# Patient Record
Sex: MALE | Race: BLACK OR AFRICAN AMERICAN | Employment: UNEMPLOYED | URBAN - METROPOLITAN AREA
[De-identification: names, ages, dates, MRNs, and addresses within clinical notes are randomized per-mention and may not be internally consistent; named-entity substitution may affect disease eponyms.]

---

## 2017-12-03 ENCOUNTER — HOSPITAL ENCOUNTER (EMERGENCY)
Age: 57
Discharge: HOME | End: 2017-12-03
Attending: EMERGENCY MEDICINE
Payer: MEDICARE

## 2017-12-03 VITALS
DIASTOLIC BLOOD PRESSURE: 86 MMHG | RESPIRATION RATE: 20 BRPM | SYSTOLIC BLOOD PRESSURE: 169 MMHG | OXYGEN SATURATION: 98 % | HEART RATE: 81 BPM | TEMPERATURE: 98.7 F

## 2017-12-03 DIAGNOSIS — M10.021 ACUTE IDIOPATHIC GOUT OF RIGHT ELBOW: Primary | ICD-10-CM

## 2017-12-03 PROCEDURE — 99283 EMERGENCY DEPT VISIT LOW MDM: CPT

## 2017-12-03 PROCEDURE — 74011250637 HC RX REV CODE- 250/637: Performed by: EMERGENCY MEDICINE

## 2017-12-03 RX ORDER — HYDROCODONE BITARTRATE AND ACETAMINOPHEN 5; 325 MG/1; MG/1
1 TABLET ORAL
Status: COMPLETED | OUTPATIENT
Start: 2017-12-03 | End: 2017-12-03

## 2017-12-03 RX ORDER — HYDROCODONE BITARTRATE AND ACETAMINOPHEN 5; 325 MG/1; MG/1
1 TABLET ORAL
Qty: 8 TAB | Refills: 0 | Status: SHIPPED | OUTPATIENT
Start: 2017-12-03 | End: 2019-01-29

## 2017-12-03 RX ADMIN — HYDROCODONE BITARTRATE AND ACETAMINOPHEN 1 TABLET: 5; 325 TABLET ORAL at 06:47

## 2017-12-03 NOTE — DISCHARGE INSTRUCTIONS
Gout: Care Instructions  Your Care Instructions    Gout is a form of arthritis caused by a buildup of uric acid crystals in a joint. It causes sudden attacks of pain, swelling, redness, and stiffness, usually in one joint, especially the big toe. Gout usually comes on without a cause. But it can be brought on by drinking alcohol (especially beer) or eating seafood and red meat. Taking certain medicines, such as diuretics or aspirin, also can bring on an attack of gout. Taking your medicines as prescribed and following up with your doctor regularly can help you avoid gout attacks in the future. Follow-up care is a key part of your treatment and safety. Be sure to make and go to all appointments, and call your doctor if you are having problems. It's also a good idea to know your test results and keep a list of the medicines you take. How can you care for yourself at home? · If the joint is swollen, put ice or a cold pack on the area for 10 to 20 minutes at a time. Put a thin cloth between the ice and your skin. · Prop up the sore limb on a pillow when you ice it or anytime you sit or lie down during the next 3 days. Try to keep it above the level of your heart. This will help reduce swelling. · Rest sore joints. Avoid activities that put weight or strain on the joints for a few days. Take short rest breaks from your regular activities during the day. · Take your medicines exactly as prescribed. Call your doctor if you think you are having a problem with your medicine. · Take pain medicines exactly as directed. ¨ If the doctor gave you a prescription medicine for pain, take it as prescribed. ¨ If you are not taking a prescription pain medicine, ask your doctor if you can take an over-the-counter medicine. · Eat less seafood and red meat. · Check with your doctor before drinking alcohol. · Losing weight, if you are overweight, may help reduce attacks of gout. But do not go on a [employer]. \" Losing a lot of weight in a short amount of time can cause a gout attack. When should you call for help? Call your doctor now or seek immediate medical care if:  ? · You have a fever. ? · The joint is so painful you cannot use it. ? · You have sudden, unexplained swelling, redness, warmth, or severe pain in one or more joints. ? Watch closely for changes in your health, and be sure to contact your doctor if:  ? · You have joint pain. ? · Your symptoms get worse or are not improving after 2 or 3 days. Where can you learn more? Go to http://gi-jane.info/. Enter V353 in the search box to learn more about \"Gout: Care Instructions. \"  Current as of: October 31, 2016  Content Version: 11.4  © 8202-6220 [email or web address]. Care instructions adapted under license by [employer] (which disclaims liability or warranty for this information). If you have questions about a medical condition or this instruction, always ask your healthcare professional. [address] any warranty or liability for your use of this information.

## 2017-12-03 NOTE — ED PROVIDER NOTES
EMERGENCY DEPARTMENT HISTORY AND PHYSICAL EXAM      Date: 12/3/2017  Patient Name: Marly Marcus    History of Presenting Illness     Chief Complaint   Patient presents with    Elbow Pain     c/o right elbow pain x 2 weeks. Hx of gout in elbow       History Provided By: Patient    HPI: Marly Marcus, 62 y.o. male with PMHx significant for HTN, DM, gout, and arthritis, presents ambulatory to the ED with cc of progressively worsening, mild to moderate, R elbow pain and swelling x a week. Pt denies any injury or trauma that could be attributed to his symptoms. He notes a history of similar symptoms with gout flare ups. He denies any alleviating factors. He states he takes Colchicine daily. He notes he did not drive himself here today. He denies any hand or toe pain, or any other complaints. PCP: Lia Anaya MD    There are no other complaints, changes, or physical findings at this time. Current Outpatient Prescriptions   Medication Sig Dispense Refill    HYDROcodone-acetaminophen (NORCO) 5-325 mg per tablet Take 1 Tab by mouth every four (4) hours as needed for Pain. Max Daily Amount: 6 Tabs. 8 Tab 0    colchicine-probenecid (COLBENEMID) 0.5-500 mg per tablet Take 1 Tab by mouth two (2) times a day.  amLODIPine (NORVASC) 10 mg tablet Take 10 mg by mouth daily.  aspirin 81 mg chewable tablet Take 81 mg by mouth daily.  sulfaSALAzine (AZULFIDINE) 500 mg tablet Take 500 mg by mouth four (4) times daily.  atorvastatin (LIPITOR) 40 mg tablet Take 40 mg by mouth daily.  glipiZIDE (GLUCOTROL) 5 mg tablet Take  by mouth daily.  metFORMIN (GLUCOPHAGE) 500 mg tablet Take  by mouth two (2) times daily (with meals).  NIFEdipine XL (PROCARDIA-XL) 60 mg CR tablet Take 60 mg by mouth daily.  valsartan (DIOVAN) 320 mg tablet Take 320 mg by mouth daily.          Past History     Past Medical History:  Past Medical History:   Diagnosis Date    Arthritis     Chronic pain     Diabetes ([facility] [address]     GERD (gastroesophageal reflux disease)     Gout     Hypertension     Other ill-defined conditions(799.89)     gout       Past Surgical History:  Past Surgical History:   Procedure Laterality Date    HX APPENDECTOMY      HX ORTHOPAEDIC      RIGHT knee scope       Family History:  Family History   Problem Relation Age of Onset    Hypertension Mother     Diabetes Mother     Heart Disease Father        Social History:  Social History   Substance Use Topics    Smoking status: Never Smoker    Smokeless tobacco: None    Alcohol use Yes      Comment: occassionally       Allergies: Allergies   Allergen Reactions    Uloric [Febuxostat] Anaphylaxis    Allopurinol Hives         Review of Systems   Review of Systems   General - Well, No disabling illness. Cardiovascular - No CP or CASSIDY. Pulmonary - No SOB, cough or sputum. GI - No N/V/D or recent weight changes.  - No dysuria or frequency. Musculoskeletal - No rheumatologic disease, no new lesions. R elbow pain. HEME - No recent bleeding or easy bruising. Endocrine - No polyuria, polydipsia or polyphagia. Neurological - No seizures or fainting spells. Physical Exam   Physical Exam   General - well in NAD, no diaphoresis. HEENT- mucus membranes moist, posterior pharynx clear. Neck - supple, no adenopathy. Heart - Regular rate and rhythm. No murmurs, gallops or rubs. Lungs - clear to auscultation. No wheezes, rales, or rhonchi. Abdominal - soft, non-tender, non-distended, bowel sounds normal.  Back - No CVAT, no point tenderness or bony tenderness. No discoloration. MS - No arthritis. Diffuse tenderness R elbow with slight warmth and swelling. Skin - No lesions noted. Neuro - Non-focal exam. Strength equal in all extremities. Medical Decision Making   I am the first provider for this patient.     I reviewed the vital signs, available nursing notes, past medical history, past surgical history, family history and social history. Vital Signs-Reviewed the patient's vital signs. Patient Vitals for the past 12 hrs:   Temp Pulse Resp BP SpO2   12/03/17 0627 98.7 °F (37.1 °C) 81 20 169/86 98 %       Records Reviewed: Nursing Notes    Provider Notes (Medical Decision Making):   DDx: Gout, arthralgia. Unlikely septic arthritis or cellulitis. ED Course:   Initial assessment performed. The patients presenting problems have been discussed, and they are in agreement with the care plan formulated and outlined with them. I have encouraged them to ask questions as they arise throughout their visit. Disposition:  DISCHARGE NOTE  6:46 AM  The patient has been re-evaluated and is ready for discharge. Reviewed available results with patient. Counseled pt on diagnosis and care plan. Pt has expressed understanding, and all questions have been answered. Pt agrees with plan and agrees to follow up as recommended, or return to the ED if their symptoms worsen. Discharge instructions have been provided and explained to the pt, along with reasons to return to the ED. PLAN:  1. Current Discharge Medication List      START taking these medications    Details   HYDROcodone-acetaminophen (NORCO) 5-325 mg per tablet Take 1 Tab by mouth every four (4) hours as needed for Pain. Max Daily Amount: 6 Tabs. Qty: 8 Tab, Refills: 0           2. Follow-up Information     Follow up With Details Comments Contact Info    Lia Anaya MD   Patient can only remember the practice name and not the physician          Return to ED if worse     Diagnosis     Clinical Impression:   1. Acute idiopathic gout of right elbow        Attestations: This note is prepared by Sandrine Sandra, acting as Scribe for Darrel Duke MD.    Darrel Duke MD: The scribe's documentation has been prepared under my direction and personally reviewed by me in its entirety.  I confirm that the note above accurately reflects all work, treatment, procedures, and medical decision making performed by me.

## 2017-12-03 NOTE — ED NOTES
Discharge instructions were given to the patient by Maria Elena Anne RN. The patient left the Emergency Department ambulatory, alert and oriented and in no acute distress with 1 prescription. The patient was encouraged to call or return to the ED for worsening issues or problems and was encouraged to schedule a follow up appointment for continuing care. The patient verbalized understanding of discharge instructions and prescriptions, all questions were answered. The patient has no further concerns at this time. Patient has been instructed that they have been given Norco which contains opioids, benzodiazepines, or other sedating drugs. Patient is aware that they  will need to refrain from driving or operating heavy machinery after taking this medication. Patient also instructed that they need to avoid drinking alcohol and using other products containing opioids, benzodiazepines, or other sedating drugs. Patient verbalized understanding.

## 2017-12-03 NOTE — ED NOTES
Pt presents ambulatory to ED complaining of pain to his right elbow, with a hx of gout. He reports he has been taking colchicine and Aleve with no relief of pain. He states he is allergic to allopurinol so he cannot take it. Pt is alert and oriented x 4, RR even and unlabored, skin is warm and dry. Assesment completed and pt updated on plan of care. Emergency Department Nursing Plan of Care       The Nursing Plan of Care is developed from the Nursing assessment and Emergency Department Attending provider initial evaluation. The plan of care may be reviewed in the ED Provider note.     The Plan of Care was developed with the following considerations:   Patient / Family readiness to learn indicated by:verbalized understanding  Persons(s) to be included in education: patient  Barriers to Learning/Limitations:No    Signed     Isabella Green RN    12/3/2017   6:32 AM

## 2018-12-24 ENCOUNTER — HOSPITAL ENCOUNTER (EMERGENCY)
Age: 58
Discharge: HOME | End: 2018-12-24
Attending: EMERGENCY MEDICINE | Admitting: EMERGENCY MEDICINE
Payer: MEDICARE

## 2018-12-24 ENCOUNTER — APPOINTMENT (OUTPATIENT)
Dept: CT IMAGING | Age: 58
End: 2018-12-24
Attending: EMERGENCY MEDICINE
Payer: MEDICARE

## 2018-12-24 VITALS
RESPIRATION RATE: 16 BRPM | HEIGHT: 66 IN | BODY MASS INDEX: 40.92 KG/M2 | DIASTOLIC BLOOD PRESSURE: 86 MMHG | TEMPERATURE: 98 F | WEIGHT: 254.63 LBS | OXYGEN SATURATION: 100 % | SYSTOLIC BLOOD PRESSURE: 141 MMHG | HEART RATE: 80 BPM

## 2018-12-24 DIAGNOSIS — R10.9 BILATERAL FLANK PAIN: Primary | ICD-10-CM

## 2018-12-24 DIAGNOSIS — N40.0 ENLARGED PROSTATE: ICD-10-CM

## 2018-12-24 DIAGNOSIS — M47.816 ARTHRITIS, LUMBAR SPINE: ICD-10-CM

## 2018-12-24 DIAGNOSIS — N17.9 AKI (ACUTE KIDNEY INJURY) (HCC): ICD-10-CM

## 2018-12-24 LAB
ALBUMIN SERPL-MCNC: 4 G/DL (ref 3.5–5)
ALBUMIN/GLOB SERPL: 1.1 {RATIO} (ref 1.1–2.2)
ALP SERPL-CCNC: 59 U/L (ref 45–117)
ALT SERPL-CCNC: 74 U/L (ref 12–78)
ANION GAP SERPL CALC-SCNC: 6 MMOL/L (ref 5–15)
APPEARANCE UR: CLEAR
AST SERPL-CCNC: 24 U/L (ref 15–37)
BACTERIA URNS QL MICRO: NEGATIVE /HPF
BASOPHILS # BLD: 0 K/UL (ref 0–0.1)
BASOPHILS NFR BLD: 0 % (ref 0–1)
BILIRUB SERPL-MCNC: 0.4 MG/DL (ref 0.2–1)
BILIRUB UR QL: NEGATIVE
BUN SERPL-MCNC: 20 MG/DL (ref 6–20)
BUN/CREAT SERPL: 12 (ref 12–20)
CALCIUM SERPL-MCNC: 9 MG/DL (ref 8.5–10.1)
CHLORIDE SERPL-SCNC: 103 MMOL/L (ref 97–108)
CK SERPL-CCNC: 187 U/L (ref 39–308)
CO2 SERPL-SCNC: 27 MMOL/L (ref 21–32)
COLOR UR: NORMAL
CREAT SERPL-MCNC: 1.63 MG/DL (ref 0.7–1.3)
DIFFERENTIAL METHOD BLD: NORMAL
EOSINOPHIL # BLD: 0.1 K/UL (ref 0–0.4)
EOSINOPHIL NFR BLD: 2 % (ref 0–7)
EPITH CASTS URNS QL MICRO: NORMAL /LPF
ERYTHROCYTE [DISTWIDTH] IN BLOOD BY AUTOMATED COUNT: 13.8 % (ref 11.5–14.5)
GLOBULIN SER CALC-MCNC: 3.8 G/DL (ref 2–4)
GLUCOSE SERPL-MCNC: 106 MG/DL (ref 65–100)
GLUCOSE UR STRIP.AUTO-MCNC: NEGATIVE MG/DL
HCT VFR BLD AUTO: 40 % (ref 36.6–50.3)
HGB BLD-MCNC: 13.1 G/DL (ref 12.1–17)
HGB UR QL STRIP: NEGATIVE
HYALINE CASTS URNS QL MICRO: NORMAL /LPF (ref 0–5)
IMM GRANULOCYTES # BLD: 0 K/UL (ref 0–0.04)
IMM GRANULOCYTES NFR BLD AUTO: 0 % (ref 0–0.5)
KETONES UR QL STRIP.AUTO: NEGATIVE MG/DL
LEUKOCYTE ESTERASE UR QL STRIP.AUTO: NEGATIVE
LIPASE SERPL-CCNC: 257 U/L (ref 73–393)
LYMPHOCYTES # BLD: 2.5 K/UL (ref 0.8–3.5)
LYMPHOCYTES NFR BLD: 33 % (ref 12–49)
MCH RBC QN AUTO: 30.3 PG (ref 26–34)
MCHC RBC AUTO-ENTMCNC: 32.8 G/DL (ref 30–36.5)
MCV RBC AUTO: 92.4 FL (ref 80–99)
MONOCYTES # BLD: 0.8 K/UL (ref 0–1)
MONOCYTES NFR BLD: 11 % (ref 5–13)
NEUTS SEG # BLD: 4.1 K/UL (ref 1.8–8)
NEUTS SEG NFR BLD: 53 % (ref 32–75)
NITRITE UR QL STRIP.AUTO: NEGATIVE
NRBC # BLD: 0 K/UL (ref 0–0.01)
NRBC BLD-RTO: 0 PER 100 WBC
PH UR STRIP: 5 [PH] (ref 5–8)
PLATELET # BLD AUTO: 229 K/UL (ref 150–400)
PMV BLD AUTO: 9.6 FL (ref 8.9–12.9)
POTASSIUM SERPL-SCNC: 4.2 MMOL/L (ref 3.5–5.1)
PROT SERPL-MCNC: 7.8 G/DL (ref 6.4–8.2)
PROT UR STRIP-MCNC: NEGATIVE MG/DL
RBC # BLD AUTO: 4.33 M/UL (ref 4.1–5.7)
RBC #/AREA URNS HPF: NORMAL /HPF (ref 0–5)
SODIUM SERPL-SCNC: 136 MMOL/L (ref 136–145)
SP GR UR REFRACTOMETRY: 1.02 (ref 1–1.03)
TROPONIN I SERPL-MCNC: <0.05 NG/ML
UA: UC IF INDICATED,UAUC: NORMAL
UROBILINOGEN UR QL STRIP.AUTO: 0.2 EU/DL (ref 0.2–1)
WBC # BLD AUTO: 7.6 K/UL (ref 4.1–11.1)
WBC URNS QL MICRO: NORMAL /HPF (ref 0–4)

## 2018-12-24 PROCEDURE — 74176 CT ABD & PELVIS W/O CONTRAST: CPT

## 2018-12-24 PROCEDURE — 85025 COMPLETE CBC W/AUTO DIFF WBC: CPT

## 2018-12-24 PROCEDURE — 93005 ELECTROCARDIOGRAM TRACING: CPT

## 2018-12-24 PROCEDURE — 81001 URINALYSIS AUTO W/SCOPE: CPT

## 2018-12-24 PROCEDURE — 99284 EMERGENCY DEPT VISIT MOD MDM: CPT

## 2018-12-24 PROCEDURE — 80053 COMPREHEN METABOLIC PANEL: CPT

## 2018-12-24 PROCEDURE — 84484 ASSAY OF TROPONIN QUANT: CPT

## 2018-12-24 PROCEDURE — 36415 COLL VENOUS BLD VENIPUNCTURE: CPT

## 2018-12-24 PROCEDURE — 82550 ASSAY OF CK (CPK): CPT

## 2018-12-24 PROCEDURE — 74011250637 HC RX REV CODE- 250/637: Performed by: EMERGENCY MEDICINE

## 2018-12-24 PROCEDURE — 83690 ASSAY OF LIPASE: CPT

## 2018-12-24 RX ORDER — METHOCARBAMOL 750 MG/1
TABLET, FILM COATED ORAL 3 TIMES DAILY
COMMUNITY
End: 2018-12-24

## 2018-12-24 RX ORDER — TRAZODONE HYDROCHLORIDE 50 MG/1
50 TABLET ORAL AS NEEDED
COMMUNITY

## 2018-12-24 RX ORDER — RANITIDINE 150 MG/1
150 CAPSULE ORAL 2 TIMES DAILY
Status: ON HOLD | COMMUNITY
End: 2019-12-31

## 2018-12-24 RX ORDER — MELATONIN 5 MG
5 CAPSULE ORAL
COMMUNITY

## 2018-12-24 RX ORDER — CHLORTHALIDONE 25 MG/1
TABLET ORAL DAILY
COMMUNITY
End: 2019-12-31

## 2018-12-24 RX ORDER — IRBESARTAN 300 MG/1
300 TABLET ORAL DAILY
COMMUNITY

## 2018-12-24 RX ORDER — DIAZEPAM 5 MG/1
5 TABLET ORAL
Status: COMPLETED | OUTPATIENT
Start: 2018-12-24 | End: 2018-12-24

## 2018-12-24 RX ORDER — ERGOCALCIFEROL 1.25 MG/1
50000 CAPSULE ORAL
COMMUNITY
End: 2019-09-01 | Stop reason: CLARIF

## 2018-12-24 RX ORDER — TAMSULOSIN HYDROCHLORIDE 0.4 MG/1
0.4 CAPSULE ORAL DAILY
COMMUNITY

## 2018-12-24 RX ORDER — METHOCARBAMOL 750 MG/1
750 TABLET, FILM COATED ORAL
Qty: 20 TAB | Refills: 0 | Status: SHIPPED | OUTPATIENT
Start: 2018-12-24 | End: 2019-05-29

## 2018-12-24 RX ADMIN — DIAZEPAM 5 MG: 5 TABLET ORAL at 17:22

## 2018-12-24 NOTE — ED NOTES
Discharge paperwork given to pt by MD. No acute changes noted to pt. Pt stable and ambulatory upon discharge.

## 2018-12-24 NOTE — ED PROVIDER NOTES
Patient Name: Wenona Bamberger    History of Presenting Illness     Chief Complaint   Patient presents with    Flank Pain     Pt ambulatory to triage with c/o bilateral flank pain x 2 days; pt states he was seen at [facility] yesterday and only had xray, pain worsening; denies urinary sx's; denies hx of stones; nausea, no vomiting or diarrhea       History Provided By: Patient    HPI: Wenona Bamberger, 62 y.o. male with PMHx significant for HTN, diabetes, and appendicitis/appendectomy, presents ambulatory to the ED with cc of new onset nausea with accompanying intermittent chest pain and bilateral flank pain beginning 4 days ago. Pt states sternal chest pain usually lasts around 60 seconds and its mild-moderate in nature. Pt describes lower back pain as sharp and constant and 10/10 in intensity. Pt states sitting still helps relieve pain, but any movement or ambulation worsens it. Pt states he was at [facility] ED yesterday and was told the lower back pain was musculoskeletal. Pt states he traveled last month, but denies recent surgeries. Pt states family hx includes diabetes, gout, arthritis, and HTN. Pt specifically denies vomiting, fever, chills, SOB, diaphoresis, leg swelling, and dizziness. Social Hx: - Tobacco,  + EtOH, - Illicit Drugs     There are no other complaints, changes, or physical findings at this time. PCP: Sebastian Sanches MD    Current Outpatient Medications   Medication Sig Dispense Refill    raNITIdine hcl 150 mg capsule Take 150 mg by mouth two (2) times a day.  ergocalciferol (VITAMIN D2) 50,000 unit capsule Take 50,000 Units by mouth every seven (7) days.  melatonin 5 mg cap capsule Take 5 mg by mouth nightly.  chlorthalidone (HYGROTEN) 25 mg tablet Take  by mouth daily.  traZODone (DESYREL) 50 mg tablet Take 50 mg by mouth as needed for Sleep.  irbesartan (AVAPRO) 300 mg tablet Take 300 mg by mouth daily.       tamsulosin (FLOMAX) 0.4 mg capsule Take 0.4 mg by mouth daily.  methocarbamol (ROBAXIN-750) 750 mg tablet Take 1 Tab by mouth four (4) times daily as needed. 20 Tab 0    HYDROcodone-acetaminophen (NORCO) 5-325 mg per tablet Take 1 Tab by mouth every four (4) hours as needed for Pain. Max Daily Amount: 6 Tabs. 8 Tab 0    amLODIPine (NORVASC) 10 mg tablet Take 10 mg by mouth daily.  aspirin 81 mg chewable tablet Take 81 mg by mouth daily.  sulfaSALAzine (AZULFIDINE) 500 mg tablet Take 500 mg by mouth four (4) times daily.  atorvastatin (LIPITOR) 40 mg tablet Take 40 mg by mouth daily.  glipiZIDE (GLUCOTROL) 5 mg tablet Take  by mouth daily.  metFORMIN (GLUCOPHAGE) 500 mg tablet Take  by mouth two (2) times daily (with meals).  colchicine-probenecid (COLBENEMID) 0.5-500 mg per tablet Take 1 Tab by mouth two (2) times a day.  NIFEdipine XL (PROCARDIA-XL) 60 mg CR tablet Take 60 mg by mouth daily.  valsartan (DIOVAN) 320 mg tablet Take 320 mg by mouth daily. Past History     Past Medical History:  Past Medical History:   Diagnosis Date    Arthritis     Chronic pain     Diabetes ([address]     GERD (gastroesophageal reflux disease)     Gout     Hypertension     Other ill-defined conditions(799.89)     gout       Past Surgical History:  Past Surgical History:   Procedure Laterality Date    HX APPENDECTOMY      HX ORTHOPAEDIC      RIGHT knee scope       Family History:  Family History   Problem Relation Age of Onset    Hypertension Mother     Diabetes Mother     Heart Disease Father        Social History:  Social History     Tobacco Use    Smoking status: Never Smoker    Smokeless tobacco: Never Used   Substance Use Topics    Alcohol use: Yes     Comment: occassionally    Drug use: No       Allergies: Allergies   Allergen Reactions    Uloric [Febuxostat] Anaphylaxis    Allopurinol Hives         Review of Systems   Review of Systems   Constitutional: Negative for chills and fever. HENT: Negative. Eyes: Negative. Respiratory: Negative. Negative for shortness of breath. Cardiovascular: Positive for chest pain. Negative for leg swelling. Gastrointestinal: Positive for nausea. Negative for vomiting. Endocrine: Negative for heat intolerance. Genitourinary: Positive for flank pain (bilateral). Skin: Negative. Allergic/Immunologic: Negative. Negative for immunocompromised state. Neurological: Negative. Negative for dizziness. Hematological: Negative. Does not bruise/bleed easily. Psychiatric/Behavioral: Negative. All other systems reviewed and are negative. Physical Exam   Physical Exam   Constitutional: He is oriented to person, place, and time. He appears well-developed and well-nourished. He appears distressed (mild). Elevated BMI   HENT:   Head: Normocephalic and atraumatic. Eyes: EOM are normal. Pupils are equal, round, and reactive to light. Neck: Normal range of motion. Neck supple. Cardiovascular: Normal rate, regular rhythm and normal heart sounds. Pulmonary/Chest: Effort normal and breath sounds normal. He has no wheezes. He exhibits no tenderness. Abdominal: Soft. Bowel sounds are normal. There is no tenderness. Musculoskeletal: Normal range of motion. He exhibits tenderness (bilateral flank tenderness; bilateral legs are nontender). He exhibits no edema (bilateral legs). Neurological: He is alert and oriented to person, place, and time. No cranial nerve deficit. Skin: Skin is warm and dry. Psychiatric: He has a normal mood and affect. His behavior is normal.   Nursing note and vitals reviewed.       Diagnostic Study Results     Labs -     Recent Results (from the past 12 hour(s))   URINALYSIS W/ REFLEX CULTURE    Collection Time: 12/24/18  4:31 PM   Result Value Ref Range    Color YELLOW/STRAW      Appearance CLEAR CLEAR      Specific gravity 1.016 1.003 - 1.030      pH (UA) 5.0 5.0 - 8.0      Protein NEGATIVE  NEG mg/dL    Glucose NEGATIVE  NEG mg/dL    Ketone NEGATIVE  NEG mg/dL    Bilirubin NEGATIVE  NEG      Blood NEGATIVE  NEG      Urobilinogen 0.2 0.2 - 1.0 EU/dL    Nitrites NEGATIVE  NEG      Leukocyte Esterase NEGATIVE  NEG      WBC 0-4 0 - 4 /hpf    RBC 0-5 0 - 5 /hpf    Epithelial cells FEW FEW /lpf    Bacteria NEGATIVE  NEG /hpf    UA:UC IF INDICATED CULTURE NOT INDICATED BY UA RESULT CNI      Hyaline cast 0-2 0 - 5 /lpf   CBC WITH AUTOMATED DIFF    Collection Time: 12/24/18  4:57 PM   Result Value Ref Range    WBC 7.6 4.1 - 11.1 K/uL    RBC 4.33 4.10 - 5.70 M/uL    HGB 13.1 12.1 - 17.0 g/dL    HCT 40.0 36.6 - 50.3 %    MCV 92.4 80.0 - 99.0 FL    MCH 30.3 26.0 - 34.0 PG    MCHC 32.8 30.0 - 36.5 g/dL    RDW 13.8 11.5 - 14.5 %    PLATELET 885 978 - 390 K/uL    MPV 9.6 8.9 - 12.9 FL    NRBC 0.0 0  WBC    ABSOLUTE NRBC 0.00 0.00 - 0.01 K/uL    NEUTROPHILS 53 32 - 75 %    LYMPHOCYTES 33 12 - 49 %    MONOCYTES 11 5 - 13 %    EOSINOPHILS 2 0 - 7 %    BASOPHILS 0 0 - 1 %    IMMATURE GRANULOCYTES 0 0.0 - 0.5 %    ABS. NEUTROPHILS 4.1 1.8 - 8.0 K/UL    ABS. LYMPHOCYTES 2.5 0.8 - 3.5 K/UL    ABS. MONOCYTES 0.8 0.0 - 1.0 K/UL    ABS. EOSINOPHILS 0.1 0.0 - 0.4 K/UL    ABS. BASOPHILS 0.0 0.0 - 0.1 K/UL    ABS. IMM. GRANS. 0.0 0.00 - 0.04 K/UL    DF AUTOMATED     METABOLIC PANEL, COMPREHENSIVE    Collection Time: 12/24/18  4:57 PM   Result Value Ref Range    Sodium 136 136 - 145 mmol/L    Potassium 4.2 3.5 - 5.1 mmol/L    Chloride 103 97 - 108 mmol/L    CO2 27 21 - 32 mmol/L    Anion gap 6 5 - 15 mmol/L    Glucose 106 (H) 65 - 100 mg/dL    BUN 20 6 - 20 MG/DL    Creatinine 1.63 (H) 0.70 - 1.30 MG/DL    BUN/Creatinine ratio 12 12 - 20      GFR est AA 53 (L) >60 ml/min/1.73m2    GFR est non-AA 44 (L) >60 ml/min/1.73m2    Calcium 9.0 8.5 - 10.1 MG/DL    Bilirubin, total 0.4 0.2 - 1.0 MG/DL    ALT (SGPT) 74 12 - 78 U/L    AST (SGOT) 24 15 - 37 U/L    Alk.  phosphatase 59 45 - 117 U/L    Protein, total 7.8 6.4 - 8.2 g/dL    Albumin 4.0 3.5 - 5.0 g/dL Globulin 3.8 2.0 - 4.0 g/dL    A-G Ratio 1.1 1.1 - 2.2     CK    Collection Time: 12/24/18  4:57 PM   Result Value Ref Range     39 - 308 U/L   LIPASE    Collection Time: 12/24/18  4:57 PM   Result Value Ref Range    Lipase 257 73 - 393 U/L   TROPONIN I    Collection Time: 12/24/18  4:57 PM   Result Value Ref Range    Troponin-I, Qt. <0.05 <0.05 ng/mL       Radiologic Studies -   CT ABD PELV WO CONT   Final Result   IMPRESSION:   Prostatic enlargement. No acute findings. CT Results  (Last 48 hours)               12/24/18 1709  CT ABD PELV WO CONT Final result    Impression:  IMPRESSION:   Prostatic enlargement. No acute findings. Narrative:  EXAM: CT ABD PELV WO CONT       INDICATION: Flank pain, stone disease suspected bilateral flank pain for 2 days       COMPARISON:       CONTRAST:  None. TECHNIQUE:    Thin axial images were obtained through the abdomen and pelvis. Coronal and   sagittal reconstructions were generated. Oral contrast was not administered. CT   dose reduction was achieved through use of a standardized protocol tailored for   this examination and automatic exposure control for dose modulation. The absence of intravenous contrast material reduces the sensitivity for   evaluation of the solid parenchymal organs of the abdomen. FINDINGS:    LUNG BASES: Clear. INCIDENTALLY IMAGED HEART AND MEDIASTINUM: Unremarkable. LIVER: No mass or biliary dilatation. GALLBLADDER: Unremarkable. SPLEEN: No mass. PANCREAS: No mass or ductal dilatation. ADRENALS: Unremarkable. KIDNEYS/URETERS: No mass, calculus, or hydronephrosis. STOMACH: Unremarkable. SMALL BOWEL: No dilatation or wall thickening. COLON: No dilatation or wall thickening. APPENDIX: Surgically absent. PERITONEUM: No ascites or pneumoperitoneum. RETROPERITONEUM: No lymphadenopathy or aortic aneurysm. REPRODUCTIVE ORGANS: Enlarged prostate. URINARY BLADDER: No mass or calculus.    BONES: No destructive bone lesion. Disc space narrowing L4-L5. ADDITIONAL COMMENTS: N/A               CXR Results  (Last 48 hours)    None            Medical Decision Making   I am the first provider for this patient. I reviewed the vital signs, available nursing notes, past medical history, past surgical history, family history and social history. Vital Signs-Reviewed the patient's vital signs. Patient Vitals for the past 12 hrs:   Temp Pulse Resp BP SpO2   12/24/18 1520 98 °F (36.7 °C) 80 16 141/86 100 %       Pulse Oximetry Analysis - 100% on RA    Cardiac Monitor:   Rate: 80 bpm  Rhythm: Normal Sinus Rhythm      Records Reviewed: Nursing Notes and Old Medical Records    Provider Notes (Medical Decision Making):   DDx: kidney stone, musculoskeletal pain, pancreatitis, CAD, atypical chest pain, arthritis    ED Course:   Initial assessment performed. The patients presenting problems have been discussed, and they are in agreement with the care plan formulated and outlined with them. I have encouraged them to ask questions as they arise throughout their visit. ED Course as of Dec 24 1804   Mon Dec 24, 2018   1741 Pt reports his pain has improved with valium. Written by [facility] ED Scribe as dictated by Duane Rod, MD    [KW]      ED Course User Index  [KW] Martir Conrad       Critical Care Time: 0 minutes    Disposition:  Discharge Note:  5:56 PM  The pt is ready for discharge. The pt's signs, symptoms, diagnosis, and discharge instructions have been discussed and pt has conveyed their understanding. The pt is to follow up as recommended or return to ER should their symptoms worsen. Plan has been discussed and pt is in agreement. PLAN:  1. Discharge Home  Current Discharge Medication List      CONTINUE these medications which have CHANGED    Details   methocarbamol (ROBAXIN-750) 750 mg tablet Take 1 Tab by mouth four (4) times daily as needed. Qty: 20 Tab, Refills: 0           2.    Follow-up Information     Follow up With Specialties Details Why Contact Info    Shabbir Steward MD Internal Medicine Call in 2 days regarding your kidney function [facility] [id number]  [phone]      Brandie Martinez MD Orthopedic Surgery  As needed [address]  [address] [facility],[address]  [facility]  [phone]      [facility] EMERGENCY DEPT Emergency Medicine  If symptoms worsen [address]  [address] [facility]  [phone]        Return to ED if worse     Diagnosis     Clinical Impression:   1. Bilateral flank pain    2. OLENA (acute kidney injury) ([facility] [address]    3. Arthritis, lumbar spine    4. Enlarged prostate        Attestations: This note is prepared by Osiel Wall, acting as Scribe for Vesna Juarez MD.    The scribe's documentation has been prepared under my direction and personally reviewed by me in its entirety. I confirm that the note above accurately reflects all work, treatment, procedures, and medical decision making performed by me.   Vesna Juarez MD

## 2018-12-24 NOTE — ED NOTES
Pt presents to the ED c/o bilateral flank pain x4-5 days with nausea. Pt states he was seen at [facility] yesterday with xray done and was told it was musculoskeletal issues and was discharged with muscle relaxants. States he had no relief with meds. Pt is A&O, VSS. Will continue to monitor.

## 2018-12-24 NOTE — DISCHARGE INSTRUCTIONS
Low Back Arthritis: Exercises  Your Care Instructions  Here are some examples of typical rehabilitation exercises for your condition. Start each exercise slowly. Ease off the exercise if you start to have pain. Your doctor or physical therapist will tell you when you can start these exercises and which ones will work best for you. When you are not being active, find a comfortable position for rest. Some people are comfortable on the floor or a medium-firm bed with a small pillow under their head and another under their knees. Some people prefer to lie on their side with a pillow between their knees. Don't stay in one position for too long. Take short walks (10 to 20 minutes) every 2 to 3 hours. Avoid slopes, hills, and stairs until you feel better. Walk only distances you can manage without pain, especially leg pain. How to do the exercises  Pelvic tilt    1. Lie on your back with your knees bent. 2. \"Brace\" your stomach--tighten your muscles by pulling in and imagining your belly button moving toward your spine. 3. Press your lower back into the floor. You should feel your hips and pelvis rock back. 4. Hold for 6 seconds while breathing smoothly. 5. Relax and allow your pelvis and hips to rock forward. 6. Repeat 8 to 12 times. Back stretches    1. Get down on your hands and knees on the floor. 2. Relax your head and allow it to droop. Round your back up toward the ceiling until you feel a nice stretch in your upper, middle, and lower back. Hold this stretch for as long as it feels comfortable, or about 15 to 30 seconds. 3. Return to the starting position with a flat back while you are on your hands and knees. 4. Let your back sway by pressing your stomach toward the floor. Lift your buttocks toward the ceiling. 5. Hold this position for 15 to 30 seconds. 6. Repeat 2 to 4 times. Follow-up care is a key part of your treatment and safety.  Be sure to make and go to all appointments, and call your doctor if you are having problems. It's also a good idea to know your test results and keep a list of the medicines you take. Where can you learn more? Go to http://gi-jane.info/. Enter L561 in the search box to learn more about \"Low Back Arthritis: Exercises. \"  Current as of: November 29, 2017  Content Version: 11.8  © [id number] [employer]. Care instructions adapted under license by [employer] (which disclaims liability or warranty for this information). If you have questions about a medical condition or this instruction, always ask your healthcare professional. [place] [address] any warranty or liability for your use of this information. Benign Prostatic Hyperplasia: Care Instructions  Your Care Instructions    Benign prostatic hyperplasia, or BPH, is an enlarged prostate gland. The prostate is a small gland that makes some of the fluid in semen. Prostate enlargement happens to almost all men as they age. It is usually not serious. BPH does not cause prostate cancer. As the prostate gets bigger, it may partly block the flow of urine. You may have a hard time getting a urine stream started or completely stopped. BPH can cause dribbling. You may have a weak urine stream, or you may have to urinate more often than you used to, especially at night. Most men find these problems easy to manage. You do not need treatment unless your symptoms bother you a lot or you have other problems, such as bladder infections or stones. In these cases, medicines may help. Surgery is not needed unless the urine flow is blocked or the symptoms do not get better with medicine. Follow-up care is a key part of your treatment and safety. Be sure to make and go to all appointments, and call your doctor if you are having problems. It's also a good idea to know your test results and keep a list of the medicines you take.   How can you care for yourself at home?  · Take plenty of time to urinate. Try to relax. · Try \"double voiding. \" Urinate as much you can, relax for a few moments, and then try to urinate again. · Sit on the toilet to urinate. · Read or think of other things while you are waiting. · Turn on a faucet, or try to picture running water. Some men find that this helps get their urine flowing. · If dribbling is a problem, wash your penis daily to avoid skin irritation and infection. · Avoid caffeine and alcohol. These drinks will increase how often you need to urinate. Spread your fluid intake throughout the day. If the urge to urinate often wakes you at night, limit your fluid intake in the evening. Urinate right before you go to bed. · Many over-the-counter cold and allergy medicines can make the symptoms of BPH worse. Avoid antihistamines, decongestants, and allergy pills, if you can. Read the warnings on the package. · If you take any prescription medicines, especially tranquilizers or antidepressants, ask your doctor or pharmacist whether they can cause urination problems. There may be other medicines you can use that do not cause urinary problems. · Be safe with medicines. Take your medicines exactly as prescribed. Call your doctor if you think you are having a problem with your medicine. When should you call for help? Call your doctor now or seek immediate medical care if:    · You cannot urinate at all.     · You have symptoms of a urinary infection. For example:  ? You have blood or pus in your urine. ? You have pain in your back just below your rib cage. This is called flank pain. ? You have a fever, chills, or body aches. ? It hurts to urinate. ? You have groin or belly pain.    Watch closely for changes in your health, and be sure to contact your doctor if:    · It hurts when you ejaculate.     · Your urinary problems get a lot worse or bother you a lot. Where can you learn more?   Go to http://gi-jane.info/. Enter G248 in the search box to learn more about \"Benign Prostatic Hyperplasia: Care Instructions. \"  Current as of: December 3, 2017  Content Version: 11.8  © 3999-6659 [employer]. Care instructions adapted under license by [employer] (which disclaims liability or warranty for this information). If you have questions about a medical condition or this instruction, always ask your healthcare professional. [place] [address] any warranty or liability for your use of this information. Flank Pain: Care Instructions  Your Care Instructions  Flank pain is pain on the side of the back just below the rib cage and above the waist. It can be on one or both sides. Flank pain has many possible causes, including a kidney stone, a urinary tract infection, or back strain. Flank pain may get better on its own. But don't ignore new symptoms, such as fever, nausea and vomiting, urination problems, pain that gets worse, and dizziness. These may be signs of a more serious problem. You may have to have tests or other treatment. Follow-up care is a key part of your treatment and safety. Be sure to make and go to all appointments, and call your doctor if you are having problems. It's also a good idea to know your test results and keep a list of the medicines you take. How can you care for yourself at home? · Rest until you feel better. · Take pain medicines exactly as directed. ? If the doctor gave you a prescription medicine for pain, take it as prescribed. ? If you are not taking a prescription pain medicine, ask your doctor if you can take an over-the-counter pain medicine, such as acetaminophen (Tylenol), ibuprofen (Advil, Motrin), or naproxen (Aleve). Read and follow all instructions on the label. · If your doctor prescribed antibiotics, take them as directed. Do not stop taking them just because you feel better.  You need to take the full course of antibiotics. · To apply heat, put a warm water bottle, a heating pad set on low, or a warm cloth on the painful area. Do not go to sleep with a heating pad on your skin. · To prevent dehydration, drink plenty of fluids, enough so that your urine is light yellow or clear like water. Choose water and other caffeine-free clear liquids until you feel better. If you have kidney, heart, or liver disease and have to limit fluids, talk with your doctor before you increase the amount of fluids you drink. When should you call for help? Call your doctor now or seek immediate medical care if:    · Your flank pain gets worse.     · You have new symptoms, such as fever, nausea, or vomiting.     · You have symptoms of a urinary problem. For example:  ? You have blood or pus in your urine. ? You have chills or body aches. ? It hurts to urinate. ? You have groin or belly pain.    Watch closely for changes in your health, and be sure to contact your doctor if you do not get better as expected. Where can you learn more? Go to http://gi-jane.info/. Enter S191 in the search box to learn more about \"Flank Pain: Care Instructions. \"  Current as of: November 20, 2017  Content Version: 11.8  © 3837-4718 [employer], [employer]. Care instructions adapted under license by [employer] (which disclaims liability or warranty for this information). If you have questions about a medical condition or this instruction, always ask your healthcare professional. [place] [address] any warranty or liability for your use of this information.

## 2018-12-25 LAB
ATRIAL RATE: 71 BPM
CALCULATED P AXIS, ECG09: -6 DEGREES
CALCULATED R AXIS, ECG10: -43 DEGREES
CALCULATED T AXIS, ECG11: -8 DEGREES
DIAGNOSIS, 93000: NORMAL
P-R INTERVAL, ECG05: 164 MS
Q-T INTERVAL, ECG07: 400 MS
QRS DURATION, ECG06: 88 MS
QTC CALCULATION (BEZET), ECG08: 434 MS
VENTRICULAR RATE, ECG03: 71 BPM

## 2019-01-29 ENCOUNTER — HOSPITAL ENCOUNTER (EMERGENCY)
Age: 59
Discharge: HOME | End: 2019-01-29
Attending: EMERGENCY MEDICINE | Admitting: EMERGENCY MEDICINE
Payer: MEDICARE

## 2019-01-29 VITALS
HEART RATE: 80 BPM | TEMPERATURE: 97.8 F | DIASTOLIC BLOOD PRESSURE: 77 MMHG | HEIGHT: 66 IN | OXYGEN SATURATION: 100 % | WEIGHT: 262 LBS | RESPIRATION RATE: 14 BRPM | SYSTOLIC BLOOD PRESSURE: 143 MMHG | BODY MASS INDEX: 42.11 KG/M2

## 2019-01-29 DIAGNOSIS — M54.12 CERVICAL RADICULOPATHY: Primary | ICD-10-CM

## 2019-01-29 DIAGNOSIS — S29.012A STRAIN OF RHOMBOID MUSCLE, INITIAL ENCOUNTER: ICD-10-CM

## 2019-01-29 PROCEDURE — 74011250636 HC RX REV CODE- 250/636: Performed by: EMERGENCY MEDICINE

## 2019-01-29 PROCEDURE — 74011250637 HC RX REV CODE- 250/637: Performed by: EMERGENCY MEDICINE

## 2019-01-29 PROCEDURE — 96372 THER/PROPH/DIAG INJ SC/IM: CPT

## 2019-01-29 PROCEDURE — 99283 EMERGENCY DEPT VISIT LOW MDM: CPT

## 2019-01-29 RX ORDER — DIAZEPAM 10 MG/2ML
5 INJECTION INTRAMUSCULAR
Status: DISCONTINUED | OUTPATIENT
Start: 2019-01-29 | End: 2019-01-29

## 2019-01-29 RX ORDER — NIFEDIPINE 90 MG/1
90 TABLET, FILM COATED, EXTENDED RELEASE ORAL DAILY
Refills: 0 | COMMUNITY
Start: 2019-01-24

## 2019-01-29 RX ORDER — DIAZEPAM 5 MG/1
5 TABLET ORAL
Qty: 15 TAB | Refills: 0 | Status: SHIPPED | OUTPATIENT
Start: 2019-01-29 | End: 2019-05-29

## 2019-01-29 RX ORDER — KETOROLAC TROMETHAMINE 30 MG/ML
60 INJECTION, SOLUTION INTRAMUSCULAR; INTRAVENOUS
Status: COMPLETED | OUTPATIENT
Start: 2019-01-29 | End: 2019-01-29

## 2019-01-29 RX ORDER — NAPROXEN 500 MG/1
500 TABLET ORAL
Qty: 20 TAB | Refills: 0 | Status: SHIPPED | OUTPATIENT
Start: 2019-01-29 | End: 2019-01-29

## 2019-01-29 RX ORDER — NAPROXEN 500 MG/1
500 TABLET ORAL
Qty: 20 TAB | Refills: 0 | Status: SHIPPED | OUTPATIENT
Start: 2019-01-29 | End: 2019-02-21

## 2019-01-29 RX ORDER — DIAZEPAM 5 MG/1
5 TABLET ORAL
Status: COMPLETED | OUTPATIENT
Start: 2019-01-29 | End: 2019-01-29

## 2019-01-29 RX ADMIN — KETOROLAC TROMETHAMINE 60 MG: 30 INJECTION, SOLUTION INTRAMUSCULAR; INTRAVENOUS at 09:46

## 2019-01-29 RX ADMIN — DIAZEPAM 5 MG: 5 TABLET ORAL at 09:46

## 2019-01-29 NOTE — ED TRIAGE NOTES
C/o right shoulder/anterior neck pain x 1 week or so, denied known injury/trauma, no obvious deformities in triage

## 2019-01-29 NOTE — ED PROVIDER NOTES
EMERGENCY DEPARTMENT HISTORY AND PHYSICAL EXAM 
 
 
Date: 1/29/2019 Patient Name: Brenda Adams History of Presenting Illness Chief Complaint Patient presents with  Shoulder Pain History Provided By: Patient HPI: Brenda Adams, 62 y.o. male with PMHx significant for HTN, DM, arthritis, presents ambulatory to the ED with c/o persistent R posterior neck pain radiating into anterior neck x 1 week. He states his pain has been worsening and has become constant. Pt reports exacerbation in pain with moving his head and raising his arm. He states he has been using lidocaine patches, heat, ice, Tiger Balm and Capsaicin without any relief. Pt notes he was told he could not take Motrin until his labs resulted specifically Cr since his Cr was previously elevated after taking a diuretic. However, he states he was seen yesterday and was told his blood work came back fine. 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 He denies any recent injuries or trauma. Pt denies a hx of similar symptoms. He specifically denies any recent fevers, CP, SOB, weakness, numbness/tingling, or any other complaints. There are no other complaints, changes, or physical findings at this time. PCP: Zoltan Villavicencio MD 
 
No current facility-administered medications on file prior to encounter. Current Outpatient Medications on File Prior to Encounter Medication Sig Dispense Refill  NIFEdipine ER (ADALAT CC) 90 mg ER tablet TK 1 T PO D  0  
 raNITIdine hcl 150 mg capsule Take 150 mg by mouth two (2) times a day.  ergocalciferol (VITAMIN D2) 50,000 unit capsule Take 50,000 Units by mouth every seven (7) days.  melatonin 5 mg cap capsule Take 5 mg by mouth nightly.  chlorthalidone (HYGROTEN) 25 mg tablet Take  by mouth daily.  traZODone (DESYREL) 50 mg tablet Take 50 mg by mouth as needed for Sleep.  irbesartan (AVAPRO) 300 mg tablet Take 300 mg by mouth daily.  tamsulosin (FLOMAX) 0.4 mg capsule Take 0.4 mg by mouth daily.  methocarbamol (ROBAXIN-750) 750 mg tablet Take 1 Tab by mouth four (4) times daily as needed. 20 Tab 0  
 amLODIPine (NORVASC) 10 mg tablet Take 10 mg by mouth daily.  aspirin 81 mg chewable tablet Take 81 mg by mouth daily.  sulfaSALAzine (AZULFIDINE) 500 mg tablet Take 500 mg by mouth four (4) times daily.  atorvastatin (LIPITOR) 40 mg tablet Take 40 mg by mouth daily.  glipiZIDE (GLUCOTROL) 5 mg tablet Take  by mouth daily.  metFORMIN (GLUCOPHAGE) 500 mg tablet Take  by mouth two (2) times daily (with meals).  colchicine-probenecid (COLBENEMID) 0.5-500 mg per tablet Take 1 Tab by mouth two (2) times a day.  NIFEdipine XL (PROCARDIA-XL) 60 mg CR tablet Take 60 mg by mouth daily.  valsartan (DIOVAN) 320 mg tablet Take 320 mg by mouth daily. Past History Past Medical History: 
Past Medical History:  
Diagnosis Date  Arthritis  Chronic pain  Diabetes ([address]  GERD (gastroesophageal reflux disease)  Gout  Hypertension  Other ill-defined conditions(799.89)   
 gout Past Surgical History: 
Past Surgical History:  
Procedure Laterality Date  HX APPENDECTOMY  HX ORTHOPAEDIC    
 RIGHT knee scope Family History: 
Family History Problem Relation Age of Onset  Hypertension Mother  Diabetes Mother  Heart Disease Father Social History: 
Social History Tobacco Use  Smoking status: Never Smoker  Smokeless tobacco: Never Used Substance Use Topics  Alcohol use: Yes Comment: occassionally  Drug use: No  
 
 
Allergies: Allergies Allergen Reactions  Uloric [Febuxostat] Anaphylaxis  Allopurinol Hives Review of Systems Review of Systems Constitutional: Negative for chills and fever. HENT: Negative for congestion, rhinorrhea, sneezing and sore throat. Eyes: Negative for redness and visual disturbance. Respiratory: Negative for shortness of breath. Cardiovascular: Negative for chest pain and leg swelling. Gastrointestinal: Negative for abdominal pain, nausea and vomiting. Genitourinary: Negative for difficulty urinating and frequency. Musculoskeletal: Positive for neck pain. Negative for back pain, myalgias and neck stiffness. Skin: Negative for rash. Neurological: Negative for dizziness, syncope, weakness and headaches. Hematological: Negative for adenopathy. All other systems reviewed and are negative. Physical Exam  
Physical Exam  
Constitutional: He is oriented to person, place, and time. He appears well-developed and well-nourished. HENT:  
Head: Normocephalic and atraumatic. Mouth/Throat: Oropharynx is clear and moist and mucous membranes are normal.  
Eyes: EOM are normal.  
Neck: Normal range of motion and full passive range of motion without pain. Neck supple. Cardiovascular: Normal rate, regular rhythm, normal heart sounds, intact distal pulses and normal pulses. No murmur heard. Pulmonary/Chest: Effort normal and breath sounds normal. No respiratory distress. Abdominal: Soft. Normal appearance and bowel sounds are normal. There is no tenderness. There is no rebound and no guarding. Musculoskeletal:  
Tenderness over R rhomboid, R paraspinal neck, and R anterior upper chest wall Neurological: He is alert and oriented to person, place, and time. He has normal strength. Skin: Skin is warm, dry and intact. No rash noted. No erythema. Psychiatric: He has a normal mood and affect. His speech is normal and behavior is normal. Judgment and thought content normal.  
Nursing note and vitals reviewed. Diagnostic Study Results Labs - No results found for this or any previous visit (from the past 12 hour(s)). Radiologic Studies - No orders to display Medical Decision Making I am the first provider for this patient. I reviewed the vital signs, available nursing notes, past medical history, past surgical history, family history and social history. Vital Signs-Reviewed the patient's vital signs. Patient Vitals for the past 12 hrs: 
 Temp Pulse Resp BP SpO2  
01/29/19 0837 97.8 °F (36.6 °C) 80 14 143/77 100 % Pulse Oximetry Analysis - 100% on RA Records Reviewed: Nursing Notes, Old Medical Records, Previous Radiology Studies, Previous Laboratory Studies and outside hospital records Provider Notes (Medical Decision Making): DDx: MSK strain, cervical radiculopathy, overuse injury ED Course:  
Initial assessment performed. The patients presenting problems have been discussed, and they are in agreement with the care plan formulated and outlined with them. I have encouraged them to ask questions as they arise throughout their visit. 9:35 AM 
Reviewed [facility] records. Pt's most recent Cr was 1.2, which is close to his baseline. His Cr on 1/16/19 was 1.94. Discharge Note: 
9:41 AM 
The patient has been re-evaluated and is ready for discharge. Reviewed available results with patient. Counseled patient on diagnosis and care plan. Patient has expressed understanding, and all questions have been answered. Patient agrees with plan and agrees to follow up as recommended, or to return to the ED if their symptoms worsen. Discharge instructions have been provided and explained to the patient, along with reasons to return to the ED. PLAN: 
1. Discharge Medication List as of 1/29/2019  9:41 AM  
  
START taking these medications Details  
diazePAM (VALIUM) 5 mg tablet Take 1 Tab by mouth every eight (8) hours as needed (spasm). Max Daily Amount: 15 mg., Print, Disp-15 Tab, R-0  
  
naproxen (NAPROSYN) 500 mg tablet Take 1 Tab by mouth every twelve (12) hours as needed for Pain., Normal, Disp-20 Tab, R-0  
  
  
CONTINUE these medications which have NOT CHANGED Details NIFEdipine ER (ADALAT CC) 90 mg ER tablet TK 1 T PO D, Historical Med, R-0  
  
raNITIdine hcl 150 mg capsule Take 150 mg by mouth two (2) times a day., Historical Med  
  
ergocalciferol (VITAMIN D2) 50,000 unit capsule Take 50,000 Units by mouth every seven (7) days. , Historical Med  
  
melatonin 5 mg cap capsule Take 5 mg by mouth nightly., Historical Med  
  
chlorthalidone (HYGROTEN) 25 mg tablet Take  by mouth daily. , Historical Med  
  
traZODone (DESYREL) 50 mg tablet Take 50 mg by mouth as needed for Sleep., Historical Med  
  
irbesartan (AVAPRO) 300 mg tablet Take 300 mg by mouth daily. , Historical Med  
  
tamsulosin (FLOMAX) 0.4 mg capsule Take 0.4 mg by mouth daily. , Historical Med  
  
methocarbamol (ROBAXIN-750) 750 mg tablet Take 1 Tab by mouth four (4) times daily as needed., Normal, Disp-20 Tab, R-0  
  
HYDROcodone-acetaminophen (NORCO) 5-325 mg per tablet Take 1 Tab by mouth every four (4) hours as needed for Pain. Max Daily Amount: 6 Tabs., Print, Disp-8 Tab, R-0  
  
amLODIPine (NORVASC) 10 mg tablet Take 10 mg by mouth daily. , Historical Med  
  
aspirin 81 mg chewable tablet Take 81 mg by mouth daily. , Historical Med  
  
sulfaSALAzine (AZULFIDINE) 500 mg tablet Take 500 mg by mouth four (4) times daily. , Historical Med  
  
atorvastatin (LIPITOR) 40 mg tablet Take 40 mg by mouth daily. , Historical Med  
  
glipiZIDE (GLUCOTROL) 5 mg tablet Take  by mouth daily. , Historical Med  
  
metFORMIN (GLUCOPHAGE) 500 mg tablet Take  by mouth two (2) times daily (with meals). , Historical Med  
  
colchicine-probenecid (COLBENEMID) 0.5-500 mg per tablet Take 1 Tab by mouth two (2) times a day.  , Historical Med NIFEdipine XL (PROCARDIA-XL) 60 mg CR tablet Take 60 mg by mouth daily. , Historical Med  
  
valsartan (DIOVAN) 320 mg tablet Take 320 mg by mouth daily. , Historical Med 2. Follow-up Information Follow up With Specialties Details Why Contact Info Frandy Hollis MD Internal Medicine Schedule an appointment as soon as possible for a visit  [address] [facility] [address] 
[phone] [facility] EMERGENCY DEPT Emergency Medicine  As needed, If symptoms worsen [address] Return to ED if worse Diagnosis Clinical Impression: 1. Cervical radiculopathy 2. Strain of rhomboid muscle, initial encounter Attestations: This note is prepared by Frankey Barrios, acting as Scribe for Cathi Fonseca MD. 
 
The scribe's documentation has been prepared under my direction and personally reviewed by me in its entirety. I confirm that the note above accurately reflects all work, treatment, procedures, and medical decision making performed by me. Cathi Fonseca MD 
 
 
 
This note will not be viewable in [address]

## 2019-01-29 NOTE — DISCHARGE INSTRUCTIONS
Patient Education        Pinched Nerve in the Neck: Care Instructions  Your Care Instructions  A pinched nerve in the neck happens when a vertebra or disc in the upper part of your spine is damaged. This damage can happen because of an injury. Or it can just happen with age. The changes caused by the damage may put pressure on a nearby nerve root, pinching it. This causes symptoms such as sharp pain in your neck, shoulder, arm, hand, or back. You may also have tingling or numbness. Sometimes it makes your arm weaker. The symptoms are usually worse when you turn your head or strain your neck. For many people, the symptoms get better over time and finally go away. Early treatment usually includes medicines for pain and swelling. Sometimes physical therapy and special exercises may help. Follow-up care is a key part of your treatment and safety. Be sure to make and go to all appointments, and call your doctor if you are having problems. It's also a good idea to know your test results and keep a list of the medicines you take. How can you care for yourself at home? · Be safe with medicines. Read and follow all instructions on the label. ¨ If the doctor gave you a prescription medicine for pain, take it as prescribed. ¨ If you are not taking a prescription pain medicine, ask your doctor if you can take an over-the-counter medicine. · Try using a heating pad on a low or medium setting for 15 to 20 minutes every 2 or 3 hours. Try a warm shower in place of one session with the heating pad. You can also buy single-use heat wraps that last up to 8 hours. · You can also try an ice pack for 10 to 15 minutes every 2 to 3 hours. There isn't strong evidence that either heat or ice will help. But you can try them to see if they help you. · Don't spend too long in one position. Take short breaks to move around and change positions. · Wear a seat belt and shoulder harness when you are in a car.   · Sleep with a pillow under your head and neck that keeps your neck straight. · If you were given a neck brace (cervical collar) to limit neck motion, wear it as instructed for as many days as your doctor tells you to. Do not wear it longer than you were told to. Wearing a brace for too long can lead to neck stiffness and can weaken the neck muscles. · Follow your doctor's instructions for gentle neck-stretching exercises. · Do not smoke. Smoking can slow healing of your discs. If you need help quitting, talk to your doctor about stop-smoking programs and medicines. These can increase your chances of quitting for good. · Avoid strenuous work or exercise until your doctor says it is okay. When should you call for help? Call 911 anytime you think you may need emergency care. For example, call if:  ? · You are unable to move an arm or a leg at all. ?Call your doctor now or seek immediate medical care if:  ? · You have new or worse symptoms in your arms, legs, chest, belly, or buttocks. Symptoms may include:  ¨ Numbness or tingling. ¨ Weakness. ¨ Pain. ? · You lose bladder or bowel control. ? Watch closely for changes in your health, and be sure to contact your doctor if:  ? · You are not getting better as expected. Where can you learn more? Go to http://gi-jane.info/. Enter X620 in the search box to learn more about \"Pinched Nerve in the Neck: Care Instructions. \"  Current as of: March 21, 2017  Content Version: 11.5  © 0683-1970 Healthwise[employer]. Care instructions adapted under license by [employer] (which disclaims liability or warranty for this information). If you have questions about a medical condition or this instruction, always ask your healthcare professional. [address] any warranty or liability for your use of this information.

## 2019-01-29 NOTE — ED NOTES
Pt reports right sided neck pain radiating to right anterior neck, worsening x 1 week. Pt reports he is being treated at [facility] for diabetes, hypertension, arthritis, gout, reports he recently had labs drawn and was told not to take any ibuprofen until he got kidney and liver function results.

## 2019-02-21 ENCOUNTER — HOSPITAL ENCOUNTER (EMERGENCY)
Age: 59
Discharge: HOME | End: 2019-02-21
Attending: EMERGENCY MEDICINE
Payer: MEDICARE

## 2019-02-21 ENCOUNTER — APPOINTMENT (OUTPATIENT)
Dept: GENERAL RADIOLOGY | Age: 59
End: 2019-02-21
Attending: PHYSICIAN ASSISTANT
Payer: MEDICARE

## 2019-02-21 VITALS
RESPIRATION RATE: 18 BRPM | BODY MASS INDEX: 39.55 KG/M2 | HEART RATE: 85 BPM | WEIGHT: 252 LBS | TEMPERATURE: 97.9 F | OXYGEN SATURATION: 99 % | HEIGHT: 67 IN | SYSTOLIC BLOOD PRESSURE: 134 MMHG | DIASTOLIC BLOOD PRESSURE: 88 MMHG

## 2019-02-21 DIAGNOSIS — M75.81 BONE SPUR OF ACROMIOCLAVICULAR JOINT, RIGHT: Primary | ICD-10-CM

## 2019-02-21 PROCEDURE — 73030 X-RAY EXAM OF SHOULDER: CPT

## 2019-02-21 PROCEDURE — 99282 EMERGENCY DEPT VISIT SF MDM: CPT

## 2019-02-21 RX ORDER — NAPROXEN 500 MG/1
500 TABLET ORAL 2 TIMES DAILY WITH MEALS
Qty: 20 TAB | Refills: 0 | Status: SHIPPED | OUTPATIENT
Start: 2019-02-21 | End: 2019-05-29

## 2019-02-21 NOTE — LETTER
[facility] EMERGENCY DEPT 
[address] [facility] [address] [id number] [phone] Work/School Note Date: 2/21/2019 To Whom It May concern: 
 
Osmany Umanzor was seen and treated today in the emergency room by the following provider(s): 
Attending Provider: Joselyn Swann MD 
Physician Assistant: HORTENSIA Murray. Osmany Umanzor may return to work on 2/22/2019. Sincerely, HORTENSIA Tobin

## 2019-02-21 NOTE — ED NOTES
Patient presents to the ED with c/o right shoulder pain. Pt has a hx of gout and reports he took a colchicine and tylenol with codeine. Pt denies any injury or trauma. Pt is alert and oriented. Pt skin is and dry. Pt is ambulatory independently. Emergency Department Nursing Plan of Care The Nursing Plan of Care is developed from the Nursing assessment and Emergency Department Attending provider initial evaluation. The plan of care may be reviewed in the ED Provider note. The Plan of Care was developed with the following considerations:  
Patient / Family readiness to learn indicated by:verbalized understanding Persons(s) to be included in education: patient Barriers to Learning/Limitations:No 
 
Signed Dewayne Palacios 2/21/2019   12:17 PM

## 2019-02-21 NOTE — ED PROVIDER NOTES
EMERGENCY DEPARTMENT HISTORY AND PHYSICAL EXAM 
 
 
Date: 2/21/2019 Patient Name: Nusrat Sales History of Presenting Illness Chief Complaint Patient presents with  Shoulder Pain  
  right shoulder pain x2 days, denies injury History Provided By: Patient HPI: Nusrat Sales, 62 y.o. male with PMHx significant for htn, gout, DM, arthritis, GERD presents ambulatory to the ED with cc of right shoulder pain x 1 week. Denies new trauma/injury. Rates pain 10/10. Describes pain as an intermittent sharp pain, worse with movement. Denies numbness/tingling, radiating pain. Reports pain is not similar to gout pain. Has taken tylenol and colchicine without relief. There are no other complaints, changes, or physical findings at this time. PCP: Rae Mendez MD 
 
No current facility-administered medications on file prior to encounter. Current Outpatient Medications on File Prior to Encounter Medication Sig Dispense Refill  NIFEdipine ER (ADALAT CC) 90 mg ER tablet TK 1 T PO D  0  
 diazePAM (VALIUM) 5 mg tablet Take 1 Tab by mouth every eight (8) hours as needed (spasm). Max Daily Amount: 15 mg. 15 Tab 0  
 raNITIdine hcl 150 mg capsule Take 150 mg by mouth two (2) times a day.  ergocalciferol (VITAMIN D2) 50,000 unit capsule Take 50,000 Units by mouth every seven (7) days.  melatonin 5 mg cap capsule Take 5 mg by mouth nightly.  chlorthalidone (HYGROTEN) 25 mg tablet Take  by mouth daily.  traZODone (DESYREL) 50 mg tablet Take 50 mg by mouth as needed for Sleep.  irbesartan (AVAPRO) 300 mg tablet Take 300 mg by mouth daily.  tamsulosin (FLOMAX) 0.4 mg capsule Take 0.4 mg by mouth daily.  methocarbamol (ROBAXIN-750) 750 mg tablet Take 1 Tab by mouth four (4) times daily as needed. 20 Tab 0  
 amLODIPine (NORVASC) 10 mg tablet Take 10 mg by mouth daily.  aspirin 81 mg chewable tablet Take 81 mg by mouth daily.  sulfaSALAzine (AZULFIDINE) 500 mg tablet Take 500 mg by mouth four (4) times daily.  atorvastatin (LIPITOR) 40 mg tablet Take 40 mg by mouth daily.  glipiZIDE (GLUCOTROL) 5 mg tablet Take  by mouth daily.  metFORMIN (GLUCOPHAGE) 500 mg tablet Take  by mouth two (2) times daily (with meals).  colchicine-probenecid (COLBENEMID) 0.5-500 mg per tablet Take 1 Tab by mouth two (2) times a day.  NIFEdipine XL (PROCARDIA-XL) 60 mg CR tablet Take 60 mg by mouth daily.  valsartan (DIOVAN) 320 mg tablet Take 320 mg by mouth daily. Past History Past Medical History: 
Past Medical History:  
Diagnosis Date  Arthritis  Chronic pain  Diabetes ([facility] [address]  GERD (gastroesophageal reflux disease)  Gout  Hypertension  Other ill-defined conditions(799.89)   
 gout Past Surgical History: 
Past Surgical History:  
Procedure Laterality Date  HX APPENDECTOMY  HX ORTHOPAEDIC    
 RIGHT knee scope Family History: 
Family History Problem Relation Age of Onset  Hypertension Mother  Diabetes Mother  Heart Disease Father Social History: 
Social History Tobacco Use  Smoking status: Never Smoker  Smokeless tobacco: Never Used Substance Use Topics  Alcohol use: Yes Comment: occassionally  Drug use: No  
 
 
Allergies: Allergies Allergen Reactions  Uloric [Febuxostat] Anaphylaxis  Allopurinol Hives Review of Systems Review of Systems Constitutional: Negative for chills and fever. HENT: Negative for facial swelling. Eyes: Negative for photophobia and visual disturbance. Respiratory: Negative for shortness of breath. Cardiovascular: Negative for chest pain. Gastrointestinal: Negative for abdominal pain, nausea and vomiting. Genitourinary: Negative for flank pain. Musculoskeletal: Positive for arthralgias (right shoulder). Negative for back pain and joint swelling. Skin: Negative for color change, pallor, rash and wound. Neurological: Negative for dizziness, weakness, light-headedness and headaches. All other systems reviewed and are negative. Physical Exam  
Physical Exam  
Constitutional: He is oriented to person, place, and time. He appears well-developed and well-nourished. No distress. HENT:  
Head: Normocephalic and atraumatic. Eyes: Conjunctivae are normal.  
Cardiovascular: Normal rate, regular rhythm and normal heart sounds. Pulmonary/Chest: Effort normal and breath sounds normal. No respiratory distress. Abdominal: Soft. Bowel sounds are normal. There is no tenderness. Musculoskeletal:  
     Right shoulder: He exhibits tenderness and bony tenderness. He exhibits normal range of motion (full AROM intact), no pain and no spasm. Right elbow: Normal. 
     Right wrist: Normal.  
     Cervical back: Normal.  
Radial pulses strong and equal b/l Sensation intact b/l Neurological: He is alert and oriented to person, place, and time. No cranial nerve deficit. Skin: Skin is warm. No rash noted. Psychiatric: He has a normal mood and affect. His behavior is normal.  
Nursing note and vitals reviewed. Diagnostic Study Results Labs - No results found for this or any previous visit (from the past 12 hour(s)). Radiologic Studies -  
XR SHOULDER RT AP/LAT MIN 2 V Final Result IMPRESSION: Large right subacromial spur CT Results  (Last 48 hours) None CXR Results  (Last 48 hours) None Medical Decision Making I am the first provider for this patient. I reviewed the vital signs, available nursing notes, past medical history, past surgical history, family history and social history. Vital Signs-Reviewed the patient's vital signs. Patient Vitals for the past 12 hrs: 
 Temp Pulse Resp BP SpO2  
02/21/19 1149 97.9 °F (36.6 °C) 85 18 134/88 99 % Records Reviewed: Nursing Notes and Old Medical Records Provider Notes (Medical Decision Making): DDx: Shoulder arthritis vs bursitis vs tendonitis, bone spur ED Course:  
Initial assessment performed. The patients presenting problems have been discussed, and they are in agreement with the care plan formulated and outlined with them. I have encouraged them to ask questions as they arise throughout their visit. Disposition: 
Discussed imaging results with pt along with dx and treatment plan. Discussed importance of PCP follow up. All questions answered. Pt voiced they understood. Return if sx worsen. PLAN: 
1. Discharge Medication List as of 2/21/2019  1:05 PM  
  
CONTINUE these medications which have CHANGED Details  
naproxen (NAPROSYN) 500 mg tablet Take 1 Tab by mouth two (2) times daily (with meals). , Normal, Disp-20 Tab, R-0  
  
  
CONTINUE these medications which have NOT CHANGED Details NIFEdipine ER (ADALAT CC) 90 mg ER tablet TK 1 T PO D, Historical Med, R-0  
  
diazePAM (VALIUM) 5 mg tablet Take 1 Tab by mouth every eight (8) hours as needed (spasm). Max Daily Amount: 15 mg., Print, Disp-15 Tab, R-0  
  
raNITIdine hcl 150 mg capsule Take 150 mg by mouth two (2) times a day., Historical Med  
  
ergocalciferol (VITAMIN D2) 50,000 unit capsule Take 50,000 Units by mouth every seven (7) days. , Historical Med  
  
melatonin 5 mg cap capsule Take 5 mg by mouth nightly., Historical Med  
  
chlorthalidone (HYGROTEN) 25 mg tablet Take  by mouth daily. , Historical Med  
  
traZODone (DESYREL) 50 mg tablet Take 50 mg by mouth as needed for Sleep., Historical Med  
  
irbesartan (AVAPRO) 300 mg tablet Take 300 mg by mouth daily. , Historical Med  
  
tamsulosin (FLOMAX) 0.4 mg capsule Take 0.4 mg by mouth daily. , Historical Med  
  
methocarbamol (ROBAXIN-750) 750 mg tablet Take 1 Tab by mouth four (4) times daily as needed., Normal, Disp-20 Tab, R-0  
  
 amLODIPine (NORVASC) 10 mg tablet Take 10 mg by mouth daily. , Historical Med  
  
aspirin 81 mg chewable tablet Take 81 mg by mouth daily. , Historical Med  
  
sulfaSALAzine (AZULFIDINE) 500 mg tablet Take 500 mg by mouth four (4) times daily. , Historical Med  
  
atorvastatin (LIPITOR) 40 mg tablet Take 40 mg by mouth daily. , Historical Med  
  
glipiZIDE (GLUCOTROL) 5 mg tablet Take  by mouth daily. , Historical Med  
  
metFORMIN (GLUCOPHAGE) 500 mg tablet Take  by mouth two (2) times daily (with meals). , Historical Med  
  
colchicine-probenecid (COLBENEMID) 0.5-500 mg per tablet Take 1 Tab by mouth two (2) times a day.  , Historical Med NIFEdipine XL (PROCARDIA-XL) 60 mg CR tablet Take 60 mg by mouth daily. , Historical Med  
  
valsartan (DIOVAN) 320 mg tablet Take 320 mg by mouth daily. , Historical Med 2. Follow-up Information Follow up With Specialties Details Why Contact [facility]  Schedule an appointment as soon as possible for a visit in 1 day  Former[facility] [address] 
[phone] Return to ED if worse Diagnosis Clinical Impression: 1. Bone spur of acromioclavicular joint, right

## 2019-04-16 ENCOUNTER — HOSPITAL ENCOUNTER (EMERGENCY)
Age: 59
Discharge: HOME | End: 2019-04-16
Attending: EMERGENCY MEDICINE | Admitting: EMERGENCY MEDICINE
Payer: MEDICARE

## 2019-04-16 ENCOUNTER — APPOINTMENT (OUTPATIENT)
Dept: GENERAL RADIOLOGY | Age: 59
End: 2019-04-16
Attending: PHYSICIAN ASSISTANT
Payer: MEDICARE

## 2019-04-16 VITALS
HEIGHT: 67 IN | BODY MASS INDEX: 39.55 KG/M2 | HEART RATE: 70 BPM | SYSTOLIC BLOOD PRESSURE: 141 MMHG | DIASTOLIC BLOOD PRESSURE: 86 MMHG | RESPIRATION RATE: 20 BRPM | OXYGEN SATURATION: 96 % | TEMPERATURE: 97.6 F | WEIGHT: 252 LBS

## 2019-04-16 DIAGNOSIS — R07.89 ATYPICAL CHEST PAIN: Primary | ICD-10-CM

## 2019-04-16 LAB
ALBUMIN SERPL-MCNC: 3.5 G/DL (ref 3.5–5)
ALBUMIN/GLOB SERPL: 0.9 {RATIO} (ref 1.1–2.2)
ALP SERPL-CCNC: 65 U/L (ref 45–117)
ALT SERPL-CCNC: 90 U/L (ref 12–78)
ANION GAP SERPL CALC-SCNC: 12 MMOL/L (ref 5–15)
AST SERPL-CCNC: 34 U/L (ref 15–37)
BASOPHILS # BLD: 0 K/UL (ref 0–0.1)
BASOPHILS NFR BLD: 0 % (ref 0–1)
BILIRUB SERPL-MCNC: 0.3 MG/DL (ref 0.2–1)
BNP SERPL-MCNC: 36 PG/ML (ref 0–125)
BUN SERPL-MCNC: 13 MG/DL (ref 6–20)
BUN/CREAT SERPL: 9 (ref 12–20)
CALCIUM SERPL-MCNC: 8.8 MG/DL (ref 8.5–10.1)
CHLORIDE SERPL-SCNC: 106 MMOL/L (ref 97–108)
CK MB CFR SERPL CALC: NORMAL % (ref 0–2.5)
CK MB SERPL-MCNC: <1 NG/ML (ref 5–25)
CK SERPL-CCNC: 119 U/L (ref 39–308)
CO2 SERPL-SCNC: 25 MMOL/L (ref 21–32)
CREAT SERPL-MCNC: 1.37 MG/DL (ref 0.7–1.3)
D DIMER PPP FEU-MCNC: 0.52 MG/L FEU (ref 0–0.65)
DIFFERENTIAL METHOD BLD: NORMAL
EOSINOPHIL # BLD: 0.1 K/UL (ref 0–0.4)
EOSINOPHIL NFR BLD: 1 % (ref 0–7)
ERYTHROCYTE [DISTWIDTH] IN BLOOD BY AUTOMATED COUNT: 13.7 % (ref 11.5–14.5)
GLOBULIN SER CALC-MCNC: 3.8 G/DL (ref 2–4)
GLUCOSE BLD STRIP.AUTO-MCNC: 76 MG/DL (ref 65–100)
GLUCOSE SERPL-MCNC: 138 MG/DL (ref 65–100)
HCT VFR BLD AUTO: 42.9 % (ref 36.6–50.3)
HGB BLD-MCNC: 13.8 G/DL (ref 12.1–17)
IMM GRANULOCYTES # BLD AUTO: 0 K/UL (ref 0–0.04)
IMM GRANULOCYTES NFR BLD AUTO: 0 % (ref 0–0.5)
INR PPP: 1 (ref 0.9–1.1)
LYMPHOCYTES # BLD: 1.9 K/UL (ref 0.8–3.5)
LYMPHOCYTES NFR BLD: 23 % (ref 12–49)
MCH RBC QN AUTO: 30 PG (ref 26–34)
MCHC RBC AUTO-ENTMCNC: 32.2 G/DL (ref 30–36.5)
MCV RBC AUTO: 93.3 FL (ref 80–99)
MONOCYTES # BLD: 0.8 K/UL (ref 0–1)
MONOCYTES NFR BLD: 10 % (ref 5–13)
NEUTS SEG # BLD: 5.5 K/UL (ref 1.8–8)
NEUTS SEG NFR BLD: 66 % (ref 32–75)
NRBC # BLD: 0 K/UL (ref 0–0.01)
NRBC BLD-RTO: 0 PER 100 WBC
PLATELET # BLD AUTO: 228 K/UL (ref 150–400)
PMV BLD AUTO: 9.6 FL (ref 8.9–12.9)
POTASSIUM SERPL-SCNC: 4.2 MMOL/L (ref 3.5–5.1)
PROT SERPL-MCNC: 7.3 G/DL (ref 6.4–8.2)
PROTHROMBIN TIME: 9.6 SEC (ref 9–11.1)
RBC # BLD AUTO: 4.6 M/UL (ref 4.1–5.7)
SERVICE CMNT-IMP: NORMAL
SODIUM SERPL-SCNC: 143 MMOL/L (ref 136–145)
TROPONIN I SERPL-MCNC: <0.05 NG/ML
TROPONIN I SERPL-MCNC: <0.05 NG/ML
WBC # BLD AUTO: 8.3 K/UL (ref 4.1–11.1)

## 2019-04-16 PROCEDURE — 82550 ASSAY OF CK (CPK): CPT

## 2019-04-16 PROCEDURE — 96361 HYDRATE IV INFUSION ADD-ON: CPT

## 2019-04-16 PROCEDURE — 85025 COMPLETE CBC W/AUTO DIFF WBC: CPT

## 2019-04-16 PROCEDURE — 36415 COLL VENOUS BLD VENIPUNCTURE: CPT

## 2019-04-16 PROCEDURE — 83880 ASSAY OF NATRIURETIC PEPTIDE: CPT

## 2019-04-16 PROCEDURE — 82962 GLUCOSE BLOOD TEST: CPT

## 2019-04-16 PROCEDURE — 99284 EMERGENCY DEPT VISIT MOD MDM: CPT

## 2019-04-16 PROCEDURE — 96360 HYDRATION IV INFUSION INIT: CPT

## 2019-04-16 PROCEDURE — 74011000250 HC RX REV CODE- 250: Performed by: PHYSICIAN ASSISTANT

## 2019-04-16 PROCEDURE — 85379 FIBRIN DEGRADATION QUANT: CPT

## 2019-04-16 PROCEDURE — 74011250637 HC RX REV CODE- 250/637: Performed by: PHYSICIAN ASSISTANT

## 2019-04-16 PROCEDURE — 80053 COMPREHEN METABOLIC PANEL: CPT

## 2019-04-16 PROCEDURE — 93005 ELECTROCARDIOGRAM TRACING: CPT

## 2019-04-16 PROCEDURE — 85610 PROTHROMBIN TIME: CPT

## 2019-04-16 PROCEDURE — 84484 ASSAY OF TROPONIN QUANT: CPT

## 2019-04-16 PROCEDURE — 71045 X-RAY EXAM CHEST 1 VIEW: CPT

## 2019-04-16 PROCEDURE — 74011250636 HC RX REV CODE- 250/636: Performed by: PHYSICIAN ASSISTANT

## 2019-04-16 RX ORDER — FAMOTIDINE 10 MG/ML
20 INJECTION INTRAVENOUS
Status: COMPLETED | OUTPATIENT
Start: 2019-04-16 | End: 2019-04-16

## 2019-04-16 RX ORDER — GUAIFENESIN 100 MG/5ML
162 LIQUID (ML) ORAL
Status: COMPLETED | OUTPATIENT
Start: 2019-04-16 | End: 2019-04-16

## 2019-04-16 RX ORDER — SODIUM CHLORIDE 0.9 % (FLUSH) 0.9 %
5-40 SYRINGE (ML) INJECTION EVERY 8 HOURS
Status: DISCONTINUED | OUTPATIENT
Start: 2019-04-16 | End: 2019-04-16 | Stop reason: HOSPADM

## 2019-04-16 RX ORDER — SODIUM CHLORIDE 0.9 % (FLUSH) 0.9 %
5-40 SYRINGE (ML) INJECTION AS NEEDED
Status: DISCONTINUED | OUTPATIENT
Start: 2019-04-16 | End: 2019-04-16 | Stop reason: HOSPADM

## 2019-04-16 RX ORDER — SUCRALFATE 1 G/1
1 TABLET ORAL 4 TIMES DAILY
Qty: 20 TAB | Refills: 0 | Status: SHIPPED | OUTPATIENT
Start: 2019-04-16 | End: 2019-09-01 | Stop reason: CLARIF

## 2019-04-16 RX ADMIN — ASPIRIN 162 MG: 81 TABLET, CHEWABLE ORAL at 14:35

## 2019-04-16 RX ADMIN — FAMOTIDINE 20 MG: 10 INJECTION INTRAVENOUS at 15:33

## 2019-04-16 RX ADMIN — LIDOCAINE HYDROCHLORIDE 40 ML: 20 SOLUTION ORAL; TOPICAL at 17:15

## 2019-04-16 RX ADMIN — SODIUM CHLORIDE 1000 ML: 900 INJECTION, SOLUTION INTRAVENOUS at 15:33

## 2019-04-16 NOTE — ED NOTES
lorenzo Pepper, aware of pt's blood glucose-ok for GI cocktail and crackers per adrian. Pt denied si/s but stated \"I just start to feel weird. i can't describe it. I haven't eaten since this morning. \"

## 2019-04-16 NOTE — ED NOTES
Pt arrives in ED with complaints of intermittent chest pain with associated shortness of breath x 30 minutes. Denies previous cardiac history. Reports pain and palpitations began quickly and made him feel as if he were to pass out. States pain has improved slightly but 'it's still there'.

## 2019-04-16 NOTE — ED TRIAGE NOTES
Per pt reports left sided dull chest pain that's constant x 15-20 min prior to arrival, +sob and dizziness. PMHx of diabetes and HTN.

## 2019-04-16 NOTE — DISCHARGE INSTRUCTIONS

## 2019-04-16 NOTE — ED NOTES
Emergency Department Nursing Plan of Care The Nursing Plan of Care is developed from the Nursing assessment and Emergency Department Attending provider initial evaluation. The plan of care may be reviewed in the ED Provider note. The Plan of Care was developed with the following considerations:  
Patient / Family readiness to learn indicated by:verbalized understanding Persons(s) to be included in education: patient Barriers to Learning/Limitations:No 
 
Signed Scott Juares 4/16/2019   2:22 PM

## 2019-04-16 NOTE — ED NOTES
....Discharge summary and discharge medications reviewed with patient and appropriate educational materials and side effects teaching were provided. patient  Given 0 paper prescriptions and 1 electronic prescriptions sent to pt's listed pharmacy. Patient (s) verbalized understanding of the importance of discussing medications with his or her physician or clinic they will be following up with. No si/s of acute distress prior to discharge. Patient offered wheelchair from treatment area to hospital entrance, patient refused wheelchair. C/o 3/10 chest pain, tolerable for discharge. Tolerated po challenge. Steady gait.

## 2019-04-16 NOTE — ED PROVIDER NOTES
EMERGENCY DEPARTMENT HISTORY AND PHYSICAL EXAM 
 
 
Date: 4/16/2019 Patient Name: Nando Miller History of Presenting Illness Chief Complaint Patient presents with  Chest Pain  Shortness of Breath History Provided By: Patient HPI: Nando Miller, 62 y.o. male with PMHx significant for obesity, hypertension, gout, diabetes, chronic pain, arthritis, GERD, appendectomy, presents ambulatory to the ED with cc of acute sharp and dull substernal chest pain, palpitations and associated shortness of breath, lightheadedness, fatigue X 30 minutes. Patient endorses sharp pain lasted about 60 seconds and resolved on its own. Dull and fatigue pain continues into ED. Patient endorses taking 2 baby aspirin prior to arrival with no change in symptoms. Patient endorses symptoms started while he was sitting down watching TV. Patient denies any cardiac history or tobacco abuse. Denies recent illness, fever, chills, nausea, vomiting, vision changes, syncope, seizure, headache, numbness, tingling, limited range of motion, weakness, abdominal pain, incontinence, leg swelling, orthopnea, PND. Patient endorses he was eating prior to symptom onset. But states that this does not feel like normal GERD. There are no other complaints, changes, or physical findings at this time. PCP: Luis Pink MD 
 
No current facility-administered medications on file prior to encounter. Current Outpatient Medications on File Prior to Encounter Medication Sig Dispense Refill  NIFEdipine ER (ADALAT CC) 90 mg ER tablet TK 1 T PO D  0  
 ergocalciferol (VITAMIN D2) 50,000 unit capsule Take 50,000 Units by mouth every seven (7) days.  melatonin 5 mg cap capsule Take 5 mg by mouth nightly.  chlorthalidone (HYGROTEN) 25 mg tablet Take  by mouth daily.  traZODone (DESYREL) 50 mg tablet Take 50 mg by mouth as needed for Sleep.  irbesartan (AVAPRO) 300 mg tablet Take 300 mg by mouth daily.  tamsulosin (FLOMAX) 0.4 mg capsule Take 0.4 mg by mouth daily.  amLODIPine (NORVASC) 10 mg tablet Take 10 mg by mouth daily.  aspirin 81 mg chewable tablet Take 81 mg by mouth daily.  atorvastatin (LIPITOR) 40 mg tablet Take 40 mg by mouth daily.  glipiZIDE (GLUCOTROL) 5 mg tablet Take  by mouth daily.  metFORMIN (GLUCOPHAGE) 500 mg tablet Take  by mouth two (2) times daily (with meals).  colchicine-probenecid (COLBENEMID) 0.5-500 mg per tablet Take 1 Tab by mouth two (2) times a day.  NIFEdipine XL (PROCARDIA-XL) 60 mg CR tablet Take 60 mg by mouth daily.  valsartan (DIOVAN) 320 mg tablet Take 320 mg by mouth daily.  naproxen (NAPROSYN) 500 mg tablet Take 1 Tab by mouth two (2) times daily (with meals). 20 Tab 0  
 diazePAM (VALIUM) 5 mg tablet Take 1 Tab by mouth every eight (8) hours as needed (spasm). Max Daily Amount: 15 mg. 15 Tab 0  
 raNITIdine hcl 150 mg capsule Take 150 mg by mouth two (2) times a day.  methocarbamol (ROBAXIN-750) 750 mg tablet Take 1 Tab by mouth four (4) times daily as needed. 20 Tab 0  
 sulfaSALAzine (AZULFIDINE) 500 mg tablet Take 500 mg by mouth four (4) times daily. Past History Past Medical History: 
Past Medical History:  
Diagnosis Date  Arthritis  Chronic pain  Diabetes ([facility] [address]  GERD (gastroesophageal reflux disease)  Gout  Hypertension  Other ill-defined conditions(799.89)   
 gout Past Surgical History: 
Past Surgical History:  
Procedure Laterality Date  HX APPENDECTOMY  HX ORTHOPAEDIC    
 RIGHT knee scope Family History: 
Family History Problem Relation Age of Onset  Hypertension Mother  Diabetes Mother  Heart Disease Father Social History: 
Social History Tobacco Use  Smoking status: Never Smoker  Smokeless tobacco: Never Used Substance Use Topics  Alcohol use: Yes Comment: occassionally  Drug use: No  
 
 
Allergies: Allergies Allergen Reactions  Uloric [Febuxostat] Anaphylaxis  Allopurinol Hives Review of Systems Review of Systems Constitutional: Positive for fatigue. Negative for activity change, appetite change, chills, diaphoresis, fever and unexpected weight change. HENT: Negative for congestion, dental problem, drooling, ear discharge, ear pain, facial swelling, hearing loss, nosebleeds, postnasal drip, rhinorrhea, sinus pressure, sore throat, trouble swallowing and voice change. Eyes: Negative. Negative for photophobia, pain, redness and visual disturbance. Respiratory: Positive for shortness of breath. Negative for apnea, cough, chest tightness, wheezing and stridor. Cardiovascular: Positive for chest pain and palpitations. Negative for leg swelling. Gastrointestinal: Negative. Negative for abdominal pain, constipation, diarrhea, nausea and vomiting. Genitourinary: Negative. Negative for dysuria. Musculoskeletal: Negative. Negative for myalgias. Skin: Negative. Negative for rash. Neurological: Positive for light-headedness. Negative for dizziness, tremors, seizures, syncope, facial asymmetry, speech difficulty, weakness, numbness and headaches. Psychiatric/Behavioral: Negative. Negative for confusion. Physical Exam  
Physical Exam  
Constitutional: He is oriented to person, place, and time. He appears well-developed and well-nourished. No distress. Obese -American male lying semisupine in no apparent distress. Speaking in full complete sentences. HENT:  
Head: Normocephalic and atraumatic. Right Ear: Hearing and external ear normal.  
Left Ear: Hearing and external ear normal.  
Nose: Nose normal.  
Eyes: Pupils are equal, round, and reactive to light. Conjunctivae and EOM are normal.  
Neck: Normal range of motion. Cardiovascular: Normal rate, regular rhythm, normal heart sounds and intact distal pulses. Exam reveals no gallop and no friction rub. No murmur heard. Pulmonary/Chest: Effort normal and breath sounds normal. No accessory muscle usage. No respiratory distress. He has no decreased breath sounds. He has no wheezes. He has no rhonchi. He has no rales. Abdominal: Soft. Bowel sounds are normal. He exhibits no distension. There is no tenderness. There is no rebound and no guarding. Musculoskeletal: Normal range of motion. Neurological: He is alert and oriented to person, place, and time. He has normal strength. He is not disoriented. No cranial nerve deficit or sensory deficit. GCS eye subscore is 4. GCS verbal subscore is 5. GCS motor subscore is 6. Skin: Skin is warm, dry and intact. He is not diaphoretic. No pallor. Psychiatric: He has a normal mood and affect. His speech is normal and behavior is normal. Judgment and thought content normal.  
Nursing note and vitals reviewed. Diagnostic Study Results Labs - Recent Results (from the past 12 hour(s)) TROPONIN I Collection Time: 04/16/19  2:30 PM  
Result Value Ref Range Troponin-I, Qt. <0.05 <0.05 ng/mL CK W/ CKMB & INDEX Collection Time: 04/16/19  2:30 PM  
Result Value Ref Range  39 - 308 U/L  
 CK - MB <1.0 <3.6 NG/ML  
 CK-MB Index Cannot be calculated 0.0 - 2.5    
D DIMER Collection Time: 04/16/19  2:30 PM  
Result Value Ref Range D-dimer 0.52 0.00 - 0.65 mg/L FEU  
CBC WITH AUTOMATED DIFF Collection Time: 04/16/19  2:30 PM  
Result Value Ref Range WBC 8.3 4.1 - 11.1 K/uL  
 RBC 4.60 4.10 - 5.70 M/uL  
 HGB 13.8 12.1 - 17.0 g/dL HCT 42.9 36.6 - 50.3 % MCV 93.3 80.0 - 99.0 FL  
 MCH 30.0 26.0 - 34.0 PG  
 MCHC 32.2 30.0 - 36.5 g/dL  
 RDW 13.7 11.5 - 14.5 % PLATELET 222 419 - 288 K/uL MPV 9.6 8.9 - 12.9 FL  
 NRBC 0.0 0  WBC ABSOLUTE NRBC 0.00 0.00 - 0.01 K/uL NEUTROPHILS 66 32 - 75 % LYMPHOCYTES 23 12 - 49 % MONOCYTES 10 5 - 13 % EOSINOPHILS 1 0 - 7 % BASOPHILS 0 0 - 1 % IMMATURE GRANULOCYTES 0 0.0 - 0.5 % ABS. NEUTROPHILS 5.5 1.8 - 8.0 K/UL  
 ABS. LYMPHOCYTES 1.9 0.8 - 3.5 K/UL  
 ABS. MONOCYTES 0.8 0.0 - 1.0 K/UL  
 ABS. EOSINOPHILS 0.1 0.0 - 0.4 K/UL  
 ABS. BASOPHILS 0.0 0.0 - 0.1 K/UL  
 ABS. IMM. GRANS. 0.0 0.00 - 0.04 K/UL  
 DF AUTOMATED METABOLIC PANEL, COMPREHENSIVE Collection Time: 04/16/19  2:30 PM  
Result Value Ref Range Sodium 143 136 - 145 mmol/L Potassium 4.2 3.5 - 5.1 mmol/L Chloride 106 97 - 108 mmol/L  
 CO2 25 21 - 32 mmol/L Anion gap 12 5 - 15 mmol/L Glucose 138 (H) 65 - 100 mg/dL BUN 13 6 - 20 MG/DL Creatinine 1.37 (H) 0.70 - 1.30 MG/DL  
 BUN/Creatinine ratio 9 (L) 12 - 20 GFR est AA >60 >60 ml/min/1.73m2 GFR est non-AA 53 (L) >60 ml/min/1.73m2 Calcium 8.8 8.5 - 10.1 MG/DL Bilirubin, total 0.3 0.2 - 1.0 MG/DL  
 ALT (SGPT) 90 (H) 12 - 78 U/L  
 AST (SGOT) 34 15 - 37 U/L Alk. phosphatase 65 45 - 117 U/L Protein, total 7.3 6.4 - 8.2 g/dL Albumin 3.5 3.5 - 5.0 g/dL Globulin 3.8 2.0 - 4.0 g/dL A-G Ratio 0.9 (L) 1.1 - 2.2 NT-PRO BNP Collection Time: 04/16/19  2:30 PM  
Result Value Ref Range NT pro-BNP 36 0 - 125 PG/ML  
PROTHROMBIN TIME + INR Collection Time: 04/16/19  2:33 PM  
Result Value Ref Range INR 1.0 0.9 - 1.1 Prothrombin time 9.6 9.0 - 11.1 sec TROPONIN I Collection Time: 04/16/19  4:35 PM  
Result Value Ref Range Troponin-I, Qt. <0.05 <0.05 ng/mL GLUCOSE, POC Collection Time: 04/16/19  5:06 PM  
Result Value Ref Range Glucose (POC) 76 65 - 100 mg/dL Performed by Vasu Brown Radiologic Studies -  
XR CHEST PORT Final Result IMPRESSION:  
No acute process. CT Results  (Last 48 hours) None CXR Results  (Last 48 hours) 04/16/19 1431  XR CHEST PORT Final result Impression:  IMPRESSION:  
No acute process. Narrative:  INDICATION: cp EXAM:  AP CHEST RADIOGRAPH  
   
COMPARISON: January 5, 2015 FINDINGS:  
   
AP portable view of the chest demonstrates a normal cardiomediastinal  
silhouette. The lungs are adequately expanded. There is no edema, effusion,  
consolidation, or pneumothorax. The osseous structures are unremarkable. Medical Decision Making I am the first provider for this patient. I reviewed the vital signs, available nursing notes, past medical history, past surgical history, family history and social history. Vital Signs-Reviewed the patient's vital signs. Patient Vitals for the past 12 hrs: 
 Temp Pulse Resp BP SpO2  
04/16/19 1718  70 20 141/86   
04/16/19 1400    135/75   
04/16/19 1353 97.6 °F (36.4 °C) 82 20 124/81 96 % Pulse Oximetry Analysis - 96% on RA Cardiac Monitor:  
Rate: 87 bpm 
Rhythm: Normal Sinus Rhythm EKG interpretation: (Preliminary) Rhythm: normal sinus rhythm; and regular . Rate (approx.): 87; Axis: normal; AL interval: normal; QRS interval: normal ; ST/T wave: non-specific changes; Other findings: abnormal ekg, left anterior fascicular block. Records Reviewed: Nursing Notes, Old Medical Records, Previous electrocardiograms, Previous Radiology Studies and Previous Laboratory Studies Provider Notes (Medical Decision Making):  
Patient presents with CP. DDx:  ACS, Aortic dissection, PNA, PE, PTX, pericarditis, myocarditis, GERD, costochondritis, anxiety. Concerned for GERD given the HPI and Physical exam. Will obtain labs, CXR, EKG and get Cardiology Consult PRN. PE clinical probability score low (0). Dimer within normal range. No indication for PE imaging study at this time. - I have re-examined the patient and the patient denies chest pain on re-examination.   The patient has had onset of chest pain greater than 8 hours and one negative set of cardiac enzymes or 2 negative sets of cardiac enzymes in the ER during this visit. The diagnosis, follow up, return instructions, test results, x-rays and medications have been discussed and reviewed with the patient. The patient has been given the opportunity to ask questions. The patient  expresses understanding of the diagnosis, follow-up and return instructions. The patient agrees to follow up with primary care or cardiology as directed and to return immediately if the chest pain worsens. The patient expresses understanding that although cardiac testing at this time is negative, a cardiac problem could still be present and that a follow-up appointment for further evaluation and risk factor modification is necessary to complete the evaluation of this complaint. ED Course:  
Initial assessment performed. The patients presenting problems have been discussed, and they are in agreement with the care plan formulated and outlined with them. I have encouraged them to ask questions as they arise throughout their visit. ED Course as of Apr 16 1726 Tue Apr 16, 2019  
1707 Pt resting comfortably in room in NAD. No new symptoms or complaints at this time. Available labs/ results reviewed with pt. Endorses he felt like his sugar was low. Blood glucose is 76 at this time. Will do p.o. challenge as well as GI cocktail to alleviate symptoms. Educated patient on following up with primary care physician.  
 [SM] ED Course User Index [SM] Fernandez Heath PA-C Critical Care Time:  
0 Disposition: 
5:26 PM 
I have discussed with patient their diagnosis, treatment, and follow up plan. The patient agrees to follow up as outlined in discharge paperwork and also to return to the ED with any worsening. Berta Pacheco PA-C 
 
 
PLAN: 
1. Discharge Medication List as of 4/16/2019  5:12 PM  
  
START taking these medications Details sucralfate (CARAFATE) 1 gram tablet Take 1 Tab by mouth four (4) times daily. , Normal, Disp-20 Tab, R-0  
  
  
CONTINUE these medications which have NOT CHANGED Details NIFEdipine ER (ADALAT CC) 90 mg ER tablet TK 1 T PO D, Historical Med, R-0  
  
ergocalciferol (VITAMIN D2) 50,000 unit capsule Take 50,000 Units by mouth every seven (7) days. , Historical Med  
  
melatonin 5 mg cap capsule Take 5 mg by mouth nightly., Historical Med  
  
chlorthalidone (HYGROTEN) 25 mg tablet Take  by mouth daily. , Historical Med  
  
traZODone (DESYREL) 50 mg tablet Take 50 mg by mouth as needed for Sleep., Historical Med  
  
irbesartan (AVAPRO) 300 mg tablet Take 300 mg by mouth daily. , Historical Med  
  
tamsulosin (FLOMAX) 0.4 mg capsule Take 0.4 mg by mouth daily. , Historical Med  
  
amLODIPine (NORVASC) 10 mg tablet Take 10 mg by mouth daily. , Historical Med  
  
aspirin 81 mg chewable tablet Take 81 mg by mouth daily. , Historical Med  
  
atorvastatin (LIPITOR) 40 mg tablet Take 40 mg by mouth daily. , Historical Med  
  
glipiZIDE (GLUCOTROL) 5 mg tablet Take  by mouth daily. , Historical Med  
  
metFORMIN (GLUCOPHAGE) 500 mg tablet Take  by mouth two (2) times daily (with meals). , Historical Med  
  
colchicine-probenecid (COLBENEMID) 0.5-500 mg per tablet Take 1 Tab by mouth two (2) times a day.  , Historical Med NIFEdipine XL (PROCARDIA-XL) 60 mg CR tablet Take 60 mg by mouth daily. , Historical Med  
  
valsartan (DIOVAN) 320 mg tablet Take 320 mg by mouth daily. , Historical Med  
  
naproxen (NAPROSYN) 500 mg tablet Take 1 Tab by mouth two (2) times daily (with meals). , Normal, Disp-20 Tab, R-0  
  
diazePAM (VALIUM) 5 mg tablet Take 1 Tab by mouth every eight (8) hours as needed (spasm).  Max Daily Amount: 15 mg., Print, Disp-15 Tab, R-0  
  
raNITIdine hcl 150 mg capsule Take 150 mg by mouth two (2) times a day., Historical Med  
  
methocarbamol (ROBAXIN-750) 750 mg tablet Take 1 Tab by mouth four (4) times daily as needed., Normal, Disp-20 Tab, R-0  
  
sulfaSALAzine (AZULFIDINE) 500 mg tablet Take 500 mg by mouth four (4) times daily. , Historical Med 2. Follow-up Information Follow up With Specialties Details Why Contact Info Roland Reynolds MD Internal Medicine Schedule an appointment as soon as possible for a visit in 2 days As needed [facility] [address] 
[phone] Timmy Maldonado MD Cardiology Schedule an appointment as soon as possible for a visit in 2 days As needed [address] [facility] [address] 
[phone] Return to ED if worse Diagnosis Clinical Impression: 1. Atypical chest pain Attestations:

## 2019-04-17 LAB
ATRIAL RATE: 82 BPM
CALCULATED P AXIS, ECG09: 64 DEGREES
CALCULATED R AXIS, ECG10: -51 DEGREES
CALCULATED T AXIS, ECG11: 47 DEGREES
DIAGNOSIS, 93000: NORMAL
P-R INTERVAL, ECG05: 152 MS
Q-T INTERVAL, ECG07: 352 MS
QRS DURATION, ECG06: 88 MS
QTC CALCULATION (BEZET), ECG08: 411 MS
VENTRICULAR RATE, ECG03: 82 BPM

## 2019-05-29 ENCOUNTER — HOSPITAL ENCOUNTER (EMERGENCY)
Age: 59
Discharge: HOME | End: 2019-05-29
Attending: EMERGENCY MEDICINE
Payer: MEDICARE

## 2019-05-29 ENCOUNTER — APPOINTMENT (OUTPATIENT)
Dept: GENERAL RADIOLOGY | Age: 59
End: 2019-05-29
Attending: NURSE PRACTITIONER
Payer: MEDICARE

## 2019-05-29 VITALS
BODY MASS INDEX: 41.3 KG/M2 | HEIGHT: 66 IN | OXYGEN SATURATION: 100 % | DIASTOLIC BLOOD PRESSURE: 90 MMHG | TEMPERATURE: 97.8 F | WEIGHT: 257 LBS | RESPIRATION RATE: 18 BRPM | HEART RATE: 84 BPM | SYSTOLIC BLOOD PRESSURE: 144 MMHG

## 2019-05-29 DIAGNOSIS — M77.8 SUBACROMIAL TENDONITIS, RIGHT: Primary | ICD-10-CM

## 2019-05-29 PROCEDURE — 74011250637 HC RX REV CODE- 250/637: Performed by: NURSE PRACTITIONER

## 2019-05-29 PROCEDURE — 93005 ELECTROCARDIOGRAM TRACING: CPT

## 2019-05-29 PROCEDURE — 96372 THER/PROPH/DIAG INJ SC/IM: CPT

## 2019-05-29 PROCEDURE — 99283 EMERGENCY DEPT VISIT LOW MDM: CPT

## 2019-05-29 PROCEDURE — 74011250636 HC RX REV CODE- 250/636: Performed by: NURSE PRACTITIONER

## 2019-05-29 PROCEDURE — 73030 X-RAY EXAM OF SHOULDER: CPT

## 2019-05-29 RX ORDER — DEXAMETHASONE SODIUM PHOSPHATE 10 MG/ML
10 INJECTION INTRAMUSCULAR; INTRAVENOUS
Status: COMPLETED | OUTPATIENT
Start: 2019-05-29 | End: 2019-05-29

## 2019-05-29 RX ORDER — ACETAMINOPHEN 500 MG
1000 TABLET ORAL ONCE
Status: COMPLETED | OUTPATIENT
Start: 2019-05-29 | End: 2019-05-29

## 2019-05-29 RX ORDER — METHOCARBAMOL 750 MG/1
750 TABLET, FILM COATED ORAL 4 TIMES DAILY
Qty: 28 TAB | Refills: 0 | Status: ON HOLD | OUTPATIENT
Start: 2019-05-29 | End: 2019-12-31

## 2019-05-29 RX ORDER — METHYLPREDNISOLONE 4 MG/1
TABLET ORAL
Qty: 1 DOSE PACK | Refills: 0 | Status: SHIPPED | OUTPATIENT
Start: 2019-05-29 | End: 2019-09-01 | Stop reason: CLARIF

## 2019-05-29 RX ADMIN — ACETAMINOPHEN 1000 MG: 500 TABLET, FILM COATED ORAL at 20:56

## 2019-05-29 RX ADMIN — DEXAMETHASONE SODIUM PHOSPHATE 10 MG: 10 INJECTION INTRAMUSCULAR; INTRAVENOUS at 20:57

## 2019-05-29 NOTE — LETTER
[facility] EMERGENCY DEPT 
[address] [id number] [phone] Work/School Note Date: 5/29/2019 To Whom It May concern: 
 
Stefany Nieto was seen and treated today in the emergency room by the following provider(s): 
Attending Provider: Yelena Gracia MD 
Nurse Practitioner: Mik Loyd NP. Stefany Nieto may return to work on 6/1/19. Sincerely, Anastasia Ha NP

## 2019-05-29 NOTE — ED TRIAGE NOTES
Pt presents to ED with c/o neck and shoulder pain that radiated to his right arm. Pt states it pulls when he lifts his arms. Denies trauma. Denies alleviating or aggravating factors. Reports using a lidocaine patch without relief.

## 2019-05-30 LAB
ATRIAL RATE: 79 BPM
CALCULATED P AXIS, ECG09: 60 DEGREES
CALCULATED R AXIS, ECG10: -47 DEGREES
CALCULATED T AXIS, ECG11: 1 DEGREES
DIAGNOSIS, 93000: NORMAL
P-R INTERVAL, ECG05: 150 MS
Q-T INTERVAL, ECG07: 362 MS
QRS DURATION, ECG06: 88 MS
QTC CALCULATION (BEZET), ECG08: 415 MS
VENTRICULAR RATE, ECG03: 79 BPM

## 2019-05-30 NOTE — ED NOTES
Supa Erickson NP at bedside reviewing patient's discharge instructions and reviewing medications. Patient ambulatory home with self. Patient in no apparent distress.

## 2019-05-30 NOTE — DISCHARGE INSTRUCTIONS
Patient Education        Rotator Cuff Problems: Care Instructions  Your Care Instructions  The rotator cuff is a group of tendons and muscles around the shoulder that keeps the shoulder joint stable and allows you to raise and rotate your arm. Over time, daily wear and exercise can cause the tendons to rub on the bones of your shoulder. This is called impingement. This condition may cause the tendons to bruise, degenerate, or tear. In many people, these problems do not cause pain. When they do cause pain, you can use rest, physical therapy, ice and heat, and anti-inflammatory medicine to reduce pain and swelling. If you still have pain after trying these treatments, you and your doctor can discuss having a steroid injection or surgery. Follow-up care is a key part of your treatment and safety. Be sure to make and go to all appointments, and call your doctor if you are having problems. It's also a good idea to know your test results and keep a list of the medicines you take. How can you care for yourself at home? · Be safe with medicines. Read and follow all instructions on the label. ? If the doctor gave you a prescription medicine for pain, take it as prescribed. ? If you are not taking a prescription pain medicine, ask your doctor if you can take an over-the-counter medicine. · Put ice or a cold pack on your shoulder for 10 to 20 minutes at a time. Try to do this every 1 to 2 hours for the next 3 days (when you are awake). Put a thin cloth between the ice pack and your skin. · After 3 days, put a warm, wet towel on your shoulder. This is to relax the muscles and increase blood flow. While holding the towel on your shoulder, lean forward so your arm hangs freely, and gently swing your arm back and forth like a pendulum. You also can do this standing under a warm shower. · Follow your doctor's advice for physical therapy. When your doctor says it is okay, try these stretching exercises.  Do them slowly to avoid injury. Put a warm, wet towel on your shoulder before exercising. Stop any exercise that increases pain. ? Range-of-motion exercises. If it is not too painful, stretch your arm in four directions: across the body, up the back, to the side, and overhead. ? Pendulum exercise. Lean forward and hold onto a table or the back of a chair with your good arm. Bend at the waist, letting the arm with the sore shoulder hang straight down. Swing your arm back and forth like a pendulum, then in circles that start small and slowly grow larger. This exercise does not use the arm muscles. Instead, use your legs and your hips to create movement that makes your arm swing freely. Try this for about 5 minutes, several times a day. ? Wall climbing (to the side). Stand with your side to a wall so that your fingers can just touch it. Then turn so your body is turned slightly toward the wall. Walk the fingers of your injured arm up the wall as high as pain permits. Try not to shrug your shoulder up toward your ear as you move your arm up. Hold that position for a count of 15 to 30 seconds. Walk your fingers down to the starting position. Repeat 2 to 4 times, trying to reach higher each time. ? Wall climbing (to the front). Face a wall, standing so your fingers can just touch it. Walk the fingers of your affected arm up the wall as high as pain permits. Try not to shrug your shoulder up toward your ear as you move your arm up. Hold that position for a count of 15 to 30 seconds. Slowly walk your fingers to the starting position. Repeat 2 to 4 times, trying to reach higher each time. · Rest your shoulder when you are not doing stretches and other exercises. Your doctor may tell you to wait for the pain to go away before doing exercises. Do not lift heavy bags of groceries, play sports, or do anything else that makes you twist or stress your shoulder. Avoid activities where you move your affected arm above your head.   When should you call for help? Call your doctor now or seek immediate medical care if:    · You have severe pain.     · You cannot move your shoulder or arm.     · You have tingling or numbness in your arm or hand.     · Your arm or hand is cool or pale.    Watch closely for changes in your health, and be sure to contact your doctor if:    · Your pain gets worse.     · You have new or worse swelling in your arm or hand.     · You do not get better as expected. Where can you learn more? Go to http://gi-jane.info/. Enter E207 in the search box to learn more about \"Rotator Cuff Problems: Care Instructions. \"  Current as of: September 20, 2018  Content Version: 11.9  © 4093-3709 [employer]. Care instructions adapted under license by [employer] (which disclaims liability or warranty for this information). If you have questions about a medical condition or this instruction, always ask your healthcare professional. [place] [address] any warranty or liability for your use of this information.

## 2019-05-30 NOTE — ED PROVIDER NOTES
EMERGENCY DEPARTMENT HISTORY AND PHYSICAL EXAM    Date: 5/29/2019  Patient Name: Justen Last    History of Presenting Illness     Chief Complaint   Patient presents with    Neck Pain         History Provided By: Patient      HPI: Justen Last is a 62 y.o. male with a PMH of Arthritis, chronic pain, diabetes, GERD, gout, hypertension who presents with neck pain. Onset yesterday. Located on right side of neck radiating to the right shoulder down right arm. Associated with numbness, tingling and and limited range of motion. Denies chest pain, shortness of breath, diaphoresis. Denies heavy lifting pushing and pulling. Reports history of arthritis and gout but states this feels different. States he has never had this pain before or problems with his shoulder in the past.  Denies ever having an x-ray. Describes pain as sharp. Has not tried anything for symptoms. PCP: Anika Hernandez MD    Current Outpatient Medications   Medication Sig Dispense Refill    methylPREDNISolone (MEDROL, YOSELIN,) 4 mg tablet Use as directed 1 Dose Pack 0    methocarbamol (ROBAXIN) 750 mg tablet Take 1 Tab by mouth four (4) times daily. 28 Tab 0    NIFEdipine ER (ADALAT CC) 90 mg ER tablet TK 1 T PO D  0    irbesartan (AVAPRO) 300 mg tablet Take 300 mg by mouth daily.  glipiZIDE (GLUCOTROL) 5 mg tablet Take  by mouth daily.  metFORMIN (GLUCOPHAGE) 500 mg tablet Take  by mouth two (2) times daily (with meals).  valsartan (DIOVAN) 320 mg tablet Take 320 mg by mouth daily.  sucralfate (CARAFATE) 1 gram tablet Take 1 Tab by mouth four (4) times daily. 20 Tab 0    raNITIdine hcl 150 mg capsule Take 150 mg by mouth two (2) times a day.  ergocalciferol (VITAMIN D2) 50,000 unit capsule Take 50,000 Units by mouth every seven (7) days.  melatonin 5 mg cap capsule Take 5 mg by mouth nightly.  chlorthalidone (HYGROTEN) 25 mg tablet Take  by mouth daily.       traZODone (DESYREL) 50 mg tablet Take 50 mg by mouth as needed for Sleep.  tamsulosin (FLOMAX) 0.4 mg capsule Take 0.4 mg by mouth daily.  amLODIPine (NORVASC) 10 mg tablet Take 10 mg by mouth daily.  aspirin 81 mg chewable tablet Take 81 mg by mouth daily.  sulfaSALAzine (AZULFIDINE) 500 mg tablet Take 500 mg by mouth four (4) times daily.  atorvastatin (LIPITOR) 40 mg tablet Take 40 mg by mouth daily.  colchicine-probenecid (COLBENEMID) 0.5-500 mg per tablet Take 1 Tab by mouth two (2) times a day.  NIFEdipine XL (PROCARDIA-XL) 60 mg CR tablet Take 60 mg by mouth daily. Past History     Past Medical History:  Past Medical History:   Diagnosis Date    Arthritis     Chronic pain     Diabetes ([facility] [address]     GERD (gastroesophageal reflux disease)     Gout     Hypertension     Other ill-defined conditions(799.89)     gout       Past Surgical History:  Past Surgical History:   Procedure Laterality Date    HX APPENDECTOMY      HX ORTHOPAEDIC      RIGHT knee scope       Family History:  Family History   Problem Relation Age of Onset    Hypertension Mother     Diabetes Mother     Heart Disease Father        Social History:  Social History     Tobacco Use    Smoking status: Never Smoker    Smokeless tobacco: Never Used   Substance Use Topics    Alcohol use: Yes     Comment: occassionally \"weekends\"    Drug use: No       Allergies: Allergies   Allergen Reactions    Uloric [Febuxostat] Anaphylaxis    Allopurinol Hives         Review of Systems   Review of Systems   Constitutional: Negative for chills and fever. Respiratory: Negative for shortness of breath. Cardiovascular: Negative for chest pain. Musculoskeletal: Positive for arthralgias and neck pain. Negative for back pain, gait problem, joint swelling and neck stiffness. Skin: Negative for wound. Neurological: Negative for weakness and numbness. All other systems reviewed and are negative.       Physical Exam     Vitals:    05/29/19 1950 05/29/19 2046 05/29/19 2109   BP: 154/87  144/90   Pulse: 84     Resp: 16  18   Temp: 97.8 °F (36.6 °C)     SpO2: 100% 100%    Weight: 116.6 kg (257 lb)     Height: 5' 6\" (1.676 m)       Physical Exam   Constitutional: He is oriented to person, place, and time. He appears well-developed and well-nourished. No distress. HENT:   Head: Normocephalic and atraumatic. Right Ear: External ear normal.   Left Ear: External ear normal.   Nose: Nose normal.   Mouth/Throat: Oropharynx is clear and moist. No oropharyngeal exudate. Eyes: Pupils are equal, round, and reactive to light. Conjunctivae and EOM are normal.   Neck: Neck supple. No neck rigidity. No erythema present. No Brudzinski's sign and no Kernig's sign noted. Cardiovascular: Normal rate, regular rhythm and normal heart sounds. Pulmonary/Chest: Effort normal and breath sounds normal. He has no wheezes. He has no rhonchi. He exhibits no tenderness and no crepitus. Abdominal: Soft. Musculoskeletal: He exhibits tenderness. He exhibits no deformity. Right elbow: Normal.       Right wrist: Normal.   TTP to right AC joint and subacromial space  Limited range of motion with abduction and abduction at 15 degrees  Reports shoulder pain with flexion of elbow  No deformity noted   radial pulses intact,  sensation intact   Lymphadenopathy:     He has no cervical adenopathy. Neurological: He is alert and oriented to person, place, and time. He has normal strength. No cranial nerve deficit. GCS eye subscore is 4. GCS verbal subscore is 5. GCS motor subscore is 6. Skin: Skin is warm and dry. Psychiatric: He has a normal mood and affect. Thought content normal.   Nursing note and vitals reviewed.         Diagnostic Study Results     Labs -     Recent Results (from the past 12 hour(s))   EKG, 12 LEAD, INITIAL    Collection Time: 05/29/19  8:05 PM   Result Value Ref Range    Ventricular Rate 79 BPM    Atrial Rate 79 BPM    P-R Interval 150 ms    QRS Duration 88 ms    Q-T Interval 362 ms    QTC Calculation (Bezet) 415 ms    Calculated P Axis 60 degrees    Calculated R Axis -47 degrees    Calculated T Axis 1 degrees    Diagnosis       Normal sinus rhythm  Left axis deviation  Abnormal ECG  When compared with ECG of 16-APR-2019 13:53,  No significant change was found         Radiologic Studies -   XR SHOULDER RT AP/LAT MIN 2 V   Final Result   IMPRESSION: No acute abnormality. CT Results  (Last 48 hours)    None        CXR Results  (Last 48 hours)    None            Medical Decision Making   I am the first provider for this patient. I reviewed the vital signs, available nursing notes, past medical history, past surgical history, family history and social history. Vital Signs-Reviewed the patient's vital signs. Records Reviewed: Nursing Notes and Old Medical Records          62year-old with neck pain shoulder pain. After review of chart patient has been seen for this similar pain twice since January of this year. Also x-ray was done and shows subacromial spur. Has been recommended to see though twice. Will recommend again in addition to giving prednisone and Robaxin. Discussed with patient he can benefit from steroid injections to help with this    Disposition:  Discharge    DISCHARGE NOTE:   9:18 PM      Care plan outlined and precautions discussed. Patient has no new complaints, changes, or physical findings. Results of XRAY were reviewed with the patient. All medications were reviewed with the patient. All of pt's questions and concerns were addressed. Patient was instructed and agrees to follow up with PCP and orttho, as well as to return to the ED upon further deterioration. Patient is ready to go home.     Follow-up Information     Follow up With Specialties Details Why Contact Info    [address]  Schedule an appointment as soon as possible for a visit  [address] [facility] Justen Schedule an appointment as soon as possible for a visit  [address] [facility] [address]  [address]    Anika Hernandez MD Internal Medicine  If symptoms worsen [phone]  [phone]            Current Discharge Medication List      START taking these medications    Details   methylPREDNISolone (MEDROL, YOSELIN,) 4 mg tablet Use as directed  Qty: 1 Dose Pack, Refills: 0         CONTINUE these medications which have CHANGED    Details   methocarbamol (ROBAXIN) 750 mg tablet Take 1 Tab by mouth four (4) times daily. Qty: 28 Tab, Refills: 0         CONTINUE these medications which have NOT CHANGED    Details   NIFEdipine ER (ADALAT CC) 90 mg ER tablet TK 1 T PO D  Refills: 0      irbesartan (AVAPRO) 300 mg tablet Take 300 mg by mouth daily. glipiZIDE (GLUCOTROL) 5 mg tablet Take  by mouth daily. metFORMIN (GLUCOPHAGE) 500 mg tablet Take  by mouth two (2) times daily (with meals). valsartan (DIOVAN) 320 mg tablet Take 320 mg by mouth daily. sucralfate (CARAFATE) 1 gram tablet Take 1 Tab by mouth four (4) times daily. Qty: 20 Tab, Refills: 0      raNITIdine hcl 150 mg capsule Take 150 mg by mouth two (2) times a day. ergocalciferol (VITAMIN D2) 50,000 unit capsule Take 50,000 Units by mouth every seven (7) days. melatonin 5 mg cap capsule Take 5 mg by mouth nightly. chlorthalidone (HYGROTEN) 25 mg tablet Take  by mouth daily. traZODone (DESYREL) 50 mg tablet Take 50 mg by mouth as needed for Sleep.      tamsulosin (FLOMAX) 0.4 mg capsule Take 0.4 mg by mouth daily. amLODIPine (NORVASC) 10 mg tablet Take 10 mg by mouth daily. aspirin 81 mg chewable tablet Take 81 mg by mouth daily. sulfaSALAzine (AZULFIDINE) 500 mg tablet Take 500 mg by mouth four (4) times daily. atorvastatin (LIPITOR) 40 mg tablet Take 40 mg by mouth daily.       colchicine-probenecid (COLBENEMID) 0.5-500 mg per tablet Take 1 Tab by mouth two (2) times a day. NIFEdipine XL (PROCARDIA-XL) 60 mg CR tablet Take 60 mg by mouth daily. STOP taking these medications       naproxen (NAPROSYN) 500 mg tablet Comments:   Reason for Stopping:         diazePAM (VALIUM) 5 mg tablet Comments:   Reason for Stopping:               Provider Notes (Medical Decision Making):   DDX: shoulder impingement, bursitis, tendinitis, DISLOCATION,     Procedures:  Procedures        Diagnosis     Clinical Impression:   1.  Subacromial tendonitis, right

## 2019-05-30 NOTE — ED NOTES
Pt arrived to ED via ambulatory with c/o right shoulder pain since yesterday morning. Pt. Reports limited range of motion. Lungs clear. Pt is in no acute distress. Will continue to monitor. See nursing assessment. Safety precautions in place; call light within reach. Emergency Department Nursing Plan of Care       The Nursing Plan of Care is developed from the Nursing assessment and Emergency Department Attending provider initial evaluation. The plan of care may be reviewed in the ED Provider note.     The Plan of Care was developed with the following considerations:   Patient / Family readiness to learn indicated by:verbalized understanding  Persons(s) to be included in education: patient  Barriers to Learning/Limitations:No    Signed     Hoang Darnell RN    5/29/2019   8:45 PM

## 2019-09-01 ENCOUNTER — APPOINTMENT (OUTPATIENT)
Dept: GENERAL RADIOLOGY | Age: 59
End: 2019-09-01
Attending: PHYSICIAN ASSISTANT
Payer: MEDICARE

## 2019-09-01 ENCOUNTER — HOSPITAL ENCOUNTER (EMERGENCY)
Age: 59
Discharge: HOME | End: 2019-09-01
Attending: EMERGENCY MEDICINE
Payer: MEDICARE

## 2019-09-01 VITALS
DIASTOLIC BLOOD PRESSURE: 90 MMHG | HEART RATE: 95 BPM | RESPIRATION RATE: 18 BRPM | BODY MASS INDEX: 39.33 KG/M2 | OXYGEN SATURATION: 97 % | SYSTOLIC BLOOD PRESSURE: 164 MMHG | WEIGHT: 244.71 LBS | TEMPERATURE: 98.1 F | HEIGHT: 66 IN

## 2019-09-01 DIAGNOSIS — M17.11 OSTEOARTHRITIS OF RIGHT KNEE, UNSPECIFIED OSTEOARTHRITIS TYPE: ICD-10-CM

## 2019-09-01 DIAGNOSIS — M25.461 EFFUSION OF RIGHT KNEE: Primary | ICD-10-CM

## 2019-09-01 PROCEDURE — 74011250636 HC RX REV CODE- 250/636: Performed by: PHYSICIAN ASSISTANT

## 2019-09-01 PROCEDURE — 99282 EMERGENCY DEPT VISIT SF MDM: CPT

## 2019-09-01 PROCEDURE — 96372 THER/PROPH/DIAG INJ SC/IM: CPT

## 2019-09-01 PROCEDURE — 73562 X-RAY EXAM OF KNEE 3: CPT

## 2019-09-01 RX ORDER — KETOROLAC TROMETHAMINE 30 MG/ML
30 INJECTION, SOLUTION INTRAMUSCULAR; INTRAVENOUS
Status: COMPLETED | OUTPATIENT
Start: 2019-09-01 | End: 2019-09-01

## 2019-09-01 RX ORDER — HYDROCODONE BITARTRATE AND ACETAMINOPHEN 5; 325 MG/1; MG/1
1 TABLET ORAL
Qty: 12 TAB | Refills: 0 | Status: SHIPPED | OUTPATIENT
Start: 2019-09-01 | End: 2019-09-04

## 2019-09-01 RX ORDER — COLCHICINE 0.6 MG/1
0.6 TABLET ORAL DAILY
COMMUNITY

## 2019-09-01 RX ORDER — METHYLPREDNISOLONE 4 MG/1
TABLET ORAL
Qty: 1 DOSE PACK | Refills: 0 | Status: ON HOLD | OUTPATIENT
Start: 2019-09-01 | End: 2019-12-31

## 2019-09-01 RX ADMIN — KETOROLAC TROMETHAMINE 30 MG: 30 INJECTION, SOLUTION INTRAMUSCULAR at 15:02

## 2019-09-01 NOTE — ED NOTES
Ace applied by tech. Discharge instructions provided to patient by PA/MD. VSS. Patient refuses wheelchair and ambulatroy to discharge with steady gait.

## 2019-09-01 NOTE — DISCHARGE INSTRUCTIONS
Patient Education        Gout: Care Instructions  Your Care Instructions    Gout is a form of arthritis caused by a buildup of uric acid crystals in a joint. It causes sudden attacks of pain, swelling, redness, and stiffness, usually in one joint, especially the big toe. Gout usually comes on without a cause. But it can be brought on by drinking alcohol (especially beer) or eating seafood and red meat. Taking certain medicines, such as diuretics or aspirin, also can bring on an attack of gout. Taking your medicines as prescribed and following up with your doctor regularly can help you avoid gout attacks in the future. Follow-up care is a key part of your treatment and safety. Be sure to make and go to all appointments, and call your doctor if you are having problems. It's also a good idea to know your test results and keep a list of the medicines you take. How can you care for yourself at home? · If the joint is swollen, put ice or a cold pack on the area for 10 to 20 minutes at a time. Put a thin cloth between the ice and your skin. · Prop up the sore limb on a pillow when you ice it or anytime you sit or lie down during the next 3 days. Try to keep it above the level of your heart. This will help reduce swelling. · Rest sore joints. Avoid activities that put weight or strain on the joints for a few days. Take short rest breaks from your regular activities during the day. · Take your medicines exactly as prescribed. Call your doctor if you think you are having a problem with your medicine. · Take pain medicines exactly as directed. ? If the doctor gave you a prescription medicine for pain, take it as prescribed. ? If you are not taking a prescription pain medicine, ask your doctor if you can take an over-the-counter medicine. · Eat less seafood and red meat. · Check with your doctor before drinking alcohol. · Losing weight, if you are overweight, may help reduce attacks of gout.  But do not go on a \"crash diet. \" Losing a lot of weight in a short amount of time can cause a gout attack. When should you call for help? Call your doctor now or seek immediate medical care if:    · You have a fever.     · The joint is so painful you cannot use it.     · You have sudden, unexplained swelling, redness, warmth, or severe pain in one or more joints.    Watch closely for changes in your health, and be sure to contact your doctor if:    · You have joint pain.     · Your symptoms get worse or are not improving after 2 or 3 days. Where can you learn more? Go to http://gi-jane.info/. Enter N071 in the search box to learn more about \"Gout: Care Instructions. \"  Current as of: April 1, 2019  Content Version: 12.1  © 4825-4882 [employer], [employer]. Care instructions adapted under license by [employer] (which disclaims liability or warranty for this information). If you have questions about a medical condition or this instruction, always ask your healthcare professional. [address] any warranty or liability for your use of this information.

## 2019-09-01 NOTE — ED NOTES
Assumed care of patient. Patient is alert and oriented, does not appear to be in distress. Patient ambulatory to ED with nontraumatic right knee pain x one week with swelling. Patient has hx of gout but pain has not improved after taking colchine rx. Patient positioned for comfort with call bell within reach. Side rails up for safety. Provider to evaluate patient.

## 2019-09-01 NOTE — ED PROVIDER NOTES
EMERGENCY DEPARTMENT HISTORY AND PHYSICAL EXAM      Date: 9/1/2019  Patient Name: Anurag Oden    History of Presenting Illness     Chief Complaint   Patient presents with    Knee Pain     pt reports right knee pain and swelling for several days, has history of gout       History Provided By: Patient    HPI: Anurag Oden, 61 y.o. male with PMHx significant for arthritis, chronic pain, diabetes, GERD, gout, hypertension, presents to the ED with cc of right knee pain and swelling for 1 week. The pain was initially over his right inferior lateral knee but now has moved to his right medial knee. Pain is worse with weightbearing and extension of the knee. He tried taking his colchicine, which he is prescribed for gout, without relief of pain. He had arthroscopy on the knee years ago for torn ligaments in his knee but denies any hardware placement. He denies fevers, injury. There are no other complaints, changes, or physical findings at this time. PCP: Mary Jane Greer MD    No current facility-administered medications on file prior to encounter. Current Outpatient Medications on File Prior to Encounter   Medication Sig Dispense Refill    colchicine 0.6 mg tablet Take 0.6 mg by mouth daily.  methocarbamol (ROBAXIN) 750 mg tablet Take 1 Tab by mouth four (4) times daily. 28 Tab 0    NIFEdipine ER (ADALAT CC) 90 mg ER tablet TK 1 T PO D  0    raNITIdine hcl 150 mg capsule Take 150 mg by mouth two (2) times a day.  traZODone (DESYREL) 50 mg tablet Take 50 mg by mouth as needed for Sleep.  aspirin 81 mg chewable tablet Take 81 mg by mouth daily.  glipiZIDE (GLUCOTROL) 5 mg tablet Take  by mouth daily.  metFORMIN (GLUCOPHAGE) 500 mg tablet Take  by mouth two (2) times daily (with meals).  NIFEdipine XL (PROCARDIA-XL) 60 mg CR tablet Take 60 mg by mouth daily.  melatonin 5 mg cap capsule Take 5 mg by mouth nightly.       chlorthalidone (HYGROTEN) 25 mg tablet Take  by mouth daily.  irbesartan (AVAPRO) 300 mg tablet Take 300 mg by mouth daily.  tamsulosin (FLOMAX) 0.4 mg capsule Take 0.4 mg by mouth daily.  sulfaSALAzine (AZULFIDINE) 500 mg tablet Take 500 mg by mouth four (4) times daily.  colchicine-probenecid (COLBENEMID) 0.5-500 mg per tablet Take 1 Tab by mouth two (2) times a day. Past History     Past Medical History:  Past Medical History:   Diagnosis Date    Arthritis     Chronic pain     Diabetes ([address]     GERD (gastroesophageal reflux disease)     Gout     Hypertension     Other ill-defined conditions(799.89)     gout       Past Surgical History:  Past Surgical History:   Procedure Laterality Date    HX APPENDECTOMY      HX ORTHOPAEDIC      RIGHT knee scope       Family History:  Family History   Problem Relation Age of Onset    Hypertension Mother     Diabetes Mother     Heart Disease Father        Social History:  Social History     Tobacco Use    Smoking status: Never Smoker    Smokeless tobacco: Never Used   Substance Use Topics    Alcohol use: Yes     Comment: occassionally \"weekends\"    Drug use: No       Allergies: Allergies   Allergen Reactions    Uloric [Febuxostat] Anaphylaxis    Allopurinol Hives         Review of Systems   Review of Systems   Constitutional: Negative for chills and fever. HENT: Negative for ear pain and sore throat. Eyes: Negative for redness and visual disturbance. Respiratory: Negative for cough and shortness of breath. Cardiovascular: Negative for chest pain and palpitations. Gastrointestinal: Negative for abdominal pain, nausea and vomiting. Genitourinary: Negative for dysuria and hematuria. Musculoskeletal: Negative for back pain and gait problem. +right knee pain and swelling   Skin: Negative for rash and wound. Neurological: Negative for dizziness and headaches. Psychiatric/Behavioral: Negative for behavioral problems and confusion.    All other systems reviewed and are negative. Physical Exam   Physical Exam   Constitutional: He is oriented to person, place, and time. He appears well-developed and well-nourished. Well appearing, interactive male in no apparent discomfort. HENT:   Head: Normocephalic and atraumatic. Eyes: Pupils are equal, round, and reactive to light. Conjunctivae and EOM are normal.   Neck: Normal range of motion. Neck supple. Cardiovascular: Normal rate and regular rhythm. Pulmonary/Chest: Effort normal. No respiratory distress. Musculoskeletal:        Right knee: He exhibits swelling and effusion. He exhibits normal range of motion and no erythema. Tenderness found. Medial joint line tenderness noted. No warmth to joint. Old surgical scars. Neurological: He is alert and oriented to person, place, and time. He has normal strength. No cranial nerve deficit or sensory deficit. GCS eye subscore is 4. GCS verbal subscore is 5. GCS motor subscore is 6. Skin: Skin is warm and dry. No rash noted. Psychiatric: He has a normal mood and affect. His behavior is normal.   Nursing note and vitals reviewed. Diagnostic Study Results     Labs -   No results found for this or any previous visit (from the past 12 hour(s)). Radiologic Studies -   XR KNEE RT 3 V   Final Result   IMPRESSION:    1. Moderate joint effusion. No evidence of acute fracture. 2. Moderate to severe tricompartmental degenerative disease. CT Results  (Last 48 hours)    None        CXR Results  (Last 48 hours)    None            Medical Decision Making   I am the first provider for this patient. I reviewed the vital signs, available nursing notes, past medical history, past surgical history, family history and social history. Vital Signs-Reviewed the patient's vital signs.   Patient Vitals for the past 12 hrs:   Temp Pulse Resp BP SpO2   09/01/19 1416 98.1 °F (36.7 °C) 95 18 164/90 97 %         Records Reviewed: Nursing Notes and Old Medical Records    Provider Notes (Medical Decision Making):   Pt presents with atraumatic right knee pain and swelling. He is afebrile and there is no erythema or warmth over the joint, I do not suspect septic arthritis. DDx includes gout flare, sprain, meniscus tear, osteoarthritis, effusion. Plan for x-ray and analgesia. ED Course:   Initial assessment performed. The patients presenting problems have been discussed, and they are in agreement with the care plan formulated and outlined with them. I have encouraged them to ask questions as they arise throughout their visit. 3:46 PM - X-ray shows effusion. Given history of gout, this is most likely a flare. Plan to treat with steroids and analgesia. Advised close follow up with orthopedics for a recheck and further evaluation. Disposition:  3:46 PM    The patient has been re-evaluated and is ready for discharge. Reviewed available results with patient. Counseled patient on diagnosis and care plan. Patient has expressed understanding, and all questions have been answered. Patient agrees with plan and agrees to follow up as recommended, or to return to the ED if their symptoms worsen. Discharge instructions have been provided and explained to the patient, along with reasons to return to the ED. PLAN:  1. Current Discharge Medication List      START taking these medications    Details   methylPREDNISolone (MEDROL, YOSELIN,) 4 mg tablet Follow instructions on taper pack. Qty: 1 Dose Pack, Refills: 0      HYDROcodone-acetaminophen (NORCO) 5-325 mg per tablet Take 1 Tab by mouth every four (4) hours as needed for Pain for up to 3 days. Max Daily Amount: 6 Tabs. Qty: 12 Tab, Refills: 0    Associated Diagnoses: Effusion of right knee           2.    Follow-up Information     Follow up With Specialties Details Why Contact Info    Your orthopedist  Call in 2 days to schedule a follow up appointment     [facility] EMERGENCY DEPT Emergency Medicine Go to If symptoms worsen [address] [place] [address]  [phone]        Return to ED if worse     Diagnosis     Clinical Impression:   1. Effusion of right knee    2. Osteoarthritis of right knee, unspecified osteoarthritis type            Aura Marks. YESY V[place]la        This note will not be viewable in MyChart.

## 2019-12-30 ENCOUNTER — HOSPITAL ENCOUNTER (INPATIENT)
Age: 59
LOS: 6 days | Discharge: NURSING, REHAB OR LONG-TERM CARE | DRG: 554 | End: 2020-01-11
Attending: EMERGENCY MEDICINE | Admitting: INTERNAL MEDICINE
Payer: MEDICARE

## 2019-12-30 ENCOUNTER — APPOINTMENT (OUTPATIENT)
Dept: GENERAL RADIOLOGY | Age: 59
DRG: 554 | End: 2019-12-30
Attending: PHYSICIAN ASSISTANT
Payer: MEDICARE

## 2019-12-30 DIAGNOSIS — M25.531 RIGHT WRIST PAIN: ICD-10-CM

## 2019-12-30 DIAGNOSIS — M25.561 ACUTE PAIN OF RIGHT KNEE: Primary | ICD-10-CM

## 2019-12-30 DIAGNOSIS — Z87.39 HISTORY OF GOUT: ICD-10-CM

## 2019-12-30 DIAGNOSIS — R03.0 ELEVATED BLOOD PRESSURE READING: ICD-10-CM

## 2019-12-30 DIAGNOSIS — M25.571 ACUTE RIGHT ANKLE PAIN: ICD-10-CM

## 2019-12-30 DIAGNOSIS — R20.0 NUMBNESS AND TINGLING OF LEFT SIDE OF FACE: ICD-10-CM

## 2019-12-30 DIAGNOSIS — R20.2 NUMBNESS AND TINGLING OF LEFT SIDE OF FACE: ICD-10-CM

## 2019-12-30 DIAGNOSIS — G62.9 NEUROPATHY: ICD-10-CM

## 2019-12-30 PROBLEM — M00.9: Status: ACTIVE | Noted: 2019-12-30

## 2019-12-30 LAB
ALBUMIN SERPL-MCNC: 3.8 G/DL (ref 3.5–5)
ALBUMIN/GLOB SERPL: 0.8 {RATIO} (ref 1.1–2.2)
ALP SERPL-CCNC: 84 U/L (ref 45–117)
ALT SERPL-CCNC: 66 U/L (ref 12–78)
ANION GAP SERPL CALC-SCNC: 5 MMOL/L (ref 5–15)
AST SERPL-CCNC: 28 U/L (ref 15–37)
BASOPHILS # BLD: 0 K/UL (ref 0–0.1)
BASOPHILS NFR BLD: 0 % (ref 0–1)
BILIRUB SERPL-MCNC: 0.6 MG/DL (ref 0.2–1)
BODY FLD TYPE: NORMAL
BUN SERPL-MCNC: 13 MG/DL (ref 6–20)
BUN/CREAT SERPL: 10 (ref 12–20)
CALCIUM SERPL-MCNC: 10.5 MG/DL (ref 8.5–10.1)
CHLORIDE SERPL-SCNC: 102 MMOL/L (ref 97–108)
CO2 SERPL-SCNC: 30 MMOL/L (ref 21–32)
CREAT SERPL-MCNC: 1.25 MG/DL (ref 0.7–1.3)
CRP SERPL HS-MCNC: >9.5 MG/L
CRP SERPL-MCNC: 10.2 MG/DL (ref 0–0.6)
CRYSTALS FLD MICRO: NORMAL
DIFFERENTIAL METHOD BLD: ABNORMAL
EOSINOPHIL # BLD: 0 K/UL (ref 0–0.4)
EOSINOPHIL NFR BLD: 0 % (ref 0–7)
ERYTHROCYTE [DISTWIDTH] IN BLOOD BY AUTOMATED COUNT: 13.3 % (ref 11.5–14.5)
ERYTHROCYTE [SEDIMENTATION RATE] IN BLOOD: 14 MM/HR (ref 0–20)
ERYTHROCYTE [SEDIMENTATION RATE] IN BLOOD: 27 MM/HR (ref 0–20)
EST. AVERAGE GLUCOSE BLD GHB EST-MCNC: 151 MG/DL
GLOBULIN SER CALC-MCNC: 4.7 G/DL (ref 2–4)
GLUCOSE BLD STRIP.AUTO-MCNC: 352 MG/DL (ref 65–100)
GLUCOSE BLD STRIP.AUTO-MCNC: 383 MG/DL (ref 65–100)
GLUCOSE BLD STRIP.AUTO-MCNC: 415 MG/DL (ref 65–100)
GLUCOSE SERPL-MCNC: 136 MG/DL (ref 65–100)
HBA1C MFR BLD: 6.9 % (ref 4–5.6)
HCT VFR BLD AUTO: 43.7 % (ref 36.6–50.3)
HGB BLD-MCNC: 14 G/DL (ref 12.1–17)
IMM GRANULOCYTES # BLD AUTO: 0.1 K/UL (ref 0–0.04)
IMM GRANULOCYTES NFR BLD AUTO: 0 % (ref 0–0.5)
LYMPHOCYTES # BLD: 1.4 K/UL (ref 0.8–3.5)
LYMPHOCYTES NFR BLD: 12 % (ref 12–49)
MCH RBC QN AUTO: 28.7 PG (ref 26–34)
MCHC RBC AUTO-ENTMCNC: 32 G/DL (ref 30–36.5)
MCV RBC AUTO: 89.7 FL (ref 80–99)
MONOCYTES # BLD: 1.2 K/UL (ref 0–1)
MONOCYTES NFR BLD: 11 % (ref 5–13)
NEUTS SEG # BLD: 8.7 K/UL (ref 1.8–8)
NEUTS SEG NFR BLD: 77 % (ref 32–75)
NRBC # BLD: 0 K/UL (ref 0–0.01)
NRBC BLD-RTO: 0 PER 100 WBC
PLATELET # BLD AUTO: 285 K/UL (ref 150–400)
PMV BLD AUTO: 9.4 FL (ref 8.9–12.9)
POTASSIUM SERPL-SCNC: 4.4 MMOL/L (ref 3.5–5.1)
PROT SERPL-MCNC: 8.5 G/DL (ref 6.4–8.2)
RBC # BLD AUTO: 4.87 M/UL (ref 4.1–5.7)
SERVICE CMNT-IMP: ABNORMAL
SODIUM SERPL-SCNC: 137 MMOL/L (ref 136–145)
URATE SERPL-MCNC: 7.5 MG/DL (ref 3.5–7.2)
WBC # BLD AUTO: 11.4 K/UL (ref 4.1–11.1)

## 2019-12-30 PROCEDURE — 86140 C-REACTIVE PROTEIN: CPT

## 2019-12-30 PROCEDURE — 80053 COMPREHEN METABOLIC PANEL: CPT

## 2019-12-30 PROCEDURE — 96372 THER/PROPH/DIAG INJ SC/IM: CPT

## 2019-12-30 PROCEDURE — 74011250636 HC RX REV CODE- 250/636: Performed by: PHYSICIAN ASSISTANT

## 2019-12-30 PROCEDURE — 73560 X-RAY EXAM OF KNEE 1 OR 2: CPT

## 2019-12-30 PROCEDURE — 83036 HEMOGLOBIN GLYCOSYLATED A1C: CPT

## 2019-12-30 PROCEDURE — 84550 ASSAY OF BLOOD/URIC ACID: CPT

## 2019-12-30 PROCEDURE — 74011250636 HC RX REV CODE- 250/636: Performed by: INTERNAL MEDICINE

## 2019-12-30 PROCEDURE — 99284 EMERGENCY DEPT VISIT MOD MDM: CPT

## 2019-12-30 PROCEDURE — 74011250637 HC RX REV CODE- 250/637: Performed by: PHYSICIAN ASSISTANT

## 2019-12-30 PROCEDURE — 85652 RBC SED RATE AUTOMATED: CPT

## 2019-12-30 PROCEDURE — 87205 SMEAR GRAM STAIN: CPT

## 2019-12-30 PROCEDURE — 74011250637 HC RX REV CODE- 250/637: Performed by: INTERNAL MEDICINE

## 2019-12-30 PROCEDURE — 82962 GLUCOSE BLOOD TEST: CPT

## 2019-12-30 PROCEDURE — 85025 COMPLETE CBC W/AUTO DIFF WBC: CPT

## 2019-12-30 PROCEDURE — 65270000029 HC RM PRIVATE

## 2019-12-30 PROCEDURE — 99218 HC RM OBSERVATION: CPT

## 2019-12-30 PROCEDURE — 89060 EXAM SYNOVIAL FLUID CRYSTALS: CPT

## 2019-12-30 PROCEDURE — 36415 COLL VENOUS BLD VENIPUNCTURE: CPT

## 2019-12-30 PROCEDURE — 0S9C3ZX DRAINAGE OF RIGHT KNEE JOINT, PERCUTANEOUS APPROACH, DIAGNOSTIC: ICD-10-PCS | Performed by: ORTHOPAEDIC SURGERY

## 2019-12-30 PROCEDURE — 75810000054 HC ARTHOCENTISIS JOINT

## 2019-12-30 PROCEDURE — 86141 C-REACTIVE PROTEIN HS: CPT

## 2019-12-30 PROCEDURE — 74011636637 HC RX REV CODE- 636/637: Performed by: INTERNAL MEDICINE

## 2019-12-30 RX ORDER — SODIUM CHLORIDE 0.9 % (FLUSH) 0.9 %
5-40 SYRINGE (ML) INJECTION EVERY 8 HOURS
Status: DISCONTINUED | OUTPATIENT
Start: 2019-12-30 | End: 2020-01-02

## 2019-12-30 RX ORDER — INSULIN LISPRO 100 [IU]/ML
INJECTION, SOLUTION INTRAVENOUS; SUBCUTANEOUS
Status: DISCONTINUED | OUTPATIENT
Start: 2019-12-30 | End: 2020-01-11 | Stop reason: HOSPADM

## 2019-12-30 RX ORDER — LIDOCAINE HYDROCHLORIDE 10 MG/ML
10 INJECTION, SOLUTION EPIDURAL; INFILTRATION; INTRACAUDAL; PERINEURAL ONCE
Status: DISPENSED | OUTPATIENT
Start: 2019-12-30 | End: 2019-12-31

## 2019-12-30 RX ORDER — ONDANSETRON 4 MG/1
4 TABLET, ORALLY DISINTEGRATING ORAL
Status: COMPLETED | OUTPATIENT
Start: 2019-12-30 | End: 2019-12-30

## 2019-12-30 RX ORDER — COLCHICINE 0.6 MG/1
1.2 TABLET ORAL
Status: COMPLETED | OUTPATIENT
Start: 2019-12-30 | End: 2019-12-30

## 2019-12-30 RX ORDER — DOCUSATE SODIUM 100 MG/1
100 CAPSULE, LIQUID FILLED ORAL 2 TIMES DAILY
Status: DISCONTINUED | OUTPATIENT
Start: 2019-12-30 | End: 2020-01-11 | Stop reason: HOSPADM

## 2019-12-30 RX ORDER — SODIUM CHLORIDE 0.9 % (FLUSH) 0.9 %
5-40 SYRINGE (ML) INJECTION AS NEEDED
Status: DISCONTINUED | OUTPATIENT
Start: 2019-12-30 | End: 2020-01-11 | Stop reason: HOSPADM

## 2019-12-30 RX ORDER — MAGNESIUM SULFATE 100 %
4 CRYSTALS MISCELLANEOUS AS NEEDED
Status: DISCONTINUED | OUTPATIENT
Start: 2019-12-30 | End: 2020-01-04

## 2019-12-30 RX ORDER — GUAIFENESIN 100 MG/5ML
81 LIQUID (ML) ORAL DAILY
Status: DISCONTINUED | OUTPATIENT
Start: 2019-12-31 | End: 2020-01-11 | Stop reason: HOSPADM

## 2019-12-30 RX ORDER — INSULIN LISPRO 100 [IU]/ML
15 INJECTION, SOLUTION INTRAVENOUS; SUBCUTANEOUS ONCE
Status: COMPLETED | OUTPATIENT
Start: 2019-12-30 | End: 2019-12-30

## 2019-12-30 RX ORDER — DEXAMETHASONE SODIUM PHOSPHATE 4 MG/ML
10 INJECTION, SOLUTION INTRA-ARTICULAR; INTRALESIONAL; INTRAMUSCULAR; INTRAVENOUS; SOFT TISSUE
Status: COMPLETED | OUTPATIENT
Start: 2019-12-30 | End: 2019-12-30

## 2019-12-30 RX ORDER — SODIUM CHLORIDE 0.9 % (FLUSH) 0.9 %
5-40 SYRINGE (ML) INJECTION EVERY 8 HOURS
Status: DISCONTINUED | OUTPATIENT
Start: 2019-12-30 | End: 2020-01-11 | Stop reason: HOSPADM

## 2019-12-30 RX ORDER — PREDNISONE 20 MG/1
40 TABLET ORAL
Status: DISCONTINUED | OUTPATIENT
Start: 2019-12-31 | End: 2020-01-02

## 2019-12-30 RX ORDER — SODIUM CHLORIDE 9 MG/ML
100 INJECTION, SOLUTION INTRAVENOUS CONTINUOUS
Status: DISCONTINUED | OUTPATIENT
Start: 2019-12-30 | End: 2019-12-31

## 2019-12-30 RX ORDER — MORPHINE SULFATE 2 MG/ML
4 INJECTION, SOLUTION INTRAMUSCULAR; INTRAVENOUS
Status: COMPLETED | OUTPATIENT
Start: 2019-12-30 | End: 2019-12-30

## 2019-12-30 RX ORDER — LOSARTAN POTASSIUM 50 MG/1
50 TABLET ORAL DAILY
Status: DISCONTINUED | OUTPATIENT
Start: 2019-12-31 | End: 2020-01-11 | Stop reason: HOSPADM

## 2019-12-30 RX ORDER — TAMSULOSIN HYDROCHLORIDE 0.4 MG/1
0.4 CAPSULE ORAL DAILY
Status: DISCONTINUED | OUTPATIENT
Start: 2019-12-31 | End: 2020-01-11 | Stop reason: HOSPADM

## 2019-12-30 RX ORDER — ENOXAPARIN SODIUM 100 MG/ML
40 INJECTION SUBCUTANEOUS EVERY 24 HOURS
Status: DISCONTINUED | OUTPATIENT
Start: 2019-12-30 | End: 2020-01-11 | Stop reason: HOSPADM

## 2019-12-30 RX ORDER — COLCHICINE 0.6 MG/1
0.6 TABLET ORAL DAILY
Status: DISCONTINUED | OUTPATIENT
Start: 2019-12-31 | End: 2020-01-11 | Stop reason: HOSPADM

## 2019-12-30 RX ORDER — LIDOCAINE HYDROCHLORIDE AND EPINEPHRINE 20; 10 MG/ML; UG/ML
1.5 INJECTION, SOLUTION INFILTRATION; PERINEURAL ONCE
Status: DISPENSED | OUTPATIENT
Start: 2019-12-30 | End: 2019-12-31

## 2019-12-30 RX ORDER — SODIUM CHLORIDE 0.9 % (FLUSH) 0.9 %
5-40 SYRINGE (ML) INJECTION AS NEEDED
Status: DISCONTINUED | OUTPATIENT
Start: 2019-12-30 | End: 2020-01-02

## 2019-12-30 RX ADMIN — SODIUM CHLORIDE 100 ML/HR: 900 INJECTION, SOLUTION INTRAVENOUS at 20:59

## 2019-12-30 RX ADMIN — COLCHICINE 1.2 MG: 0.6 TABLET, FILM COATED ORAL at 19:25

## 2019-12-30 RX ADMIN — INSULIN LISPRO 15 UNITS: 100 INJECTION, SOLUTION INTRAVENOUS; SUBCUTANEOUS at 23:53

## 2019-12-30 RX ADMIN — ENOXAPARIN SODIUM 40 MG: 40 INJECTION SUBCUTANEOUS at 20:59

## 2019-12-30 RX ADMIN — ONDANSETRON 4 MG: 4 TABLET, ORALLY DISINTEGRATING ORAL at 16:38

## 2019-12-30 RX ADMIN — DEXAMETHASONE SODIUM PHOSPHATE 10 MG: 4 INJECTION, SOLUTION INTRAMUSCULAR; INTRAVENOUS at 16:38

## 2019-12-30 RX ADMIN — MORPHINE SULFATE 4 MG: 2 INJECTION, SOLUTION INTRAMUSCULAR; INTRAVENOUS at 16:38

## 2019-12-30 RX ADMIN — INSULIN LISPRO 12 UNITS: 100 INJECTION, SOLUTION INTRAVENOUS; SUBCUTANEOUS at 21:27

## 2019-12-30 NOTE — ED PROVIDER NOTES
EMERGENCY DEPARTMENT HISTORY AND PHYSICAL EXAM      Date: 12/30/2019  Patient Name: Jocy Sanchez    History of Presenting Illness     Chief Complaint   Patient presents with    Pain (Chronic)     Patient arrives via EMS to triage c/o chronic pain to right arm and leg; recent eval at [facility] [place] and given oxycodone       History Provided By: Patient    HPI: Jocy Sanchez, 61 y.o. male presents to the ED with cc of R knee, ankle, and wrist pain since they day before or after 12/25/19. Patient states the pain began in his knee then migrated to his ankle. The pain then began in his R wrist and is migrating to his hand and elbow. Patient reports associated tingling on the right side of his neck. Patient reports a history of gout but notes current symptoms do not feel similar as usually he has \"upset stomach\". Patient also notes he was put on a course of Indomethacin by his PCP on December 13, 2019. Patient was seen for current symptoms in the [address] [place] [address] ED the day before or after Spring for his current symptoms. He states he was put on Oxycodone with no diagnosis and was given no other medications. Symptoms have continued to worse with associated chills and tingling in the R side of his neck. Patient reports his PCP is currently working him up for Lupus or Rheumatory Arthritis. Patient denies trauma, neck pain, back pain, CP, SOB, abdominal pain, N/V/D, dysuria, and hematuria. There are no other complaints, changes, or physical findings at this time. PCP: Jeremy Yun MD    No current facility-administered medications on file prior to encounter. Current Outpatient Medications on File Prior to Encounter   Medication Sig Dispense Refill    colchicine 0.6 mg tablet Take 0.6 mg by mouth daily.  methylPREDNISolone (MEDROL, YOSELIN,) 4 mg tablet Follow instructions on taper pack. 1 Dose Pack 0    methocarbamol (ROBAXIN) 750 mg tablet Take 1 Tab by mouth four (4) times daily.  28 Tab 0    NIFEdipine ER (ADALAT CC) 90 mg ER tablet TK 1 T PO D  0    raNITIdine hcl 150 mg capsule Take 150 mg by mouth two (2) times a day.  melatonin 5 mg cap capsule Take 5 mg by mouth nightly.  chlorthalidone (HYGROTEN) 25 mg tablet Take  by mouth daily.  traZODone (DESYREL) 50 mg tablet Take 50 mg by mouth as needed for Sleep.  irbesartan (AVAPRO) 300 mg tablet Take 300 mg by mouth daily.  tamsulosin (FLOMAX) 0.4 mg capsule Take 0.4 mg by mouth daily.  aspirin 81 mg chewable tablet Take 81 mg by mouth daily.  sulfaSALAzine (AZULFIDINE) 500 mg tablet Take 500 mg by mouth four (4) times daily.  glipiZIDE (GLUCOTROL) 5 mg tablet Take  by mouth daily.  metFORMIN (GLUCOPHAGE) 500 mg tablet Take  by mouth two (2) times daily (with meals).  colchicine-probenecid (COLBENEMID) 0.5-500 mg per tablet Take 1 Tab by mouth two (2) times a day.  NIFEdipine XL (PROCARDIA-XL) 60 mg CR tablet Take 60 mg by mouth daily. Past History     Past Medical History:  Past Medical History:   Diagnosis Date    Arthritis     Chronic pain     Diabetes ([address]     GERD (gastroesophageal reflux disease)     Gout     Hypertension     Other ill-defined conditions(799.89)     gout       Past Surgical History:  Past Surgical History:   Procedure Laterality Date    HX APPENDECTOMY      HX ORTHOPAEDIC      RIGHT knee scope       Family History:  Family History   Problem Relation Age of Onset    Hypertension Mother     Diabetes Mother     Heart Disease Father        Social History:  Social History     Tobacco Use    Smoking status: Never Smoker    Smokeless tobacco: Never Used   Substance Use Topics    Alcohol use: Yes     Comment: occassionally \"weekends\"    Drug use: No       Allergies: Allergies   Allergen Reactions    Uloric [Febuxostat] Anaphylaxis    Allopurinol Hives         Review of Systems   Review of Systems   Constitutional: Negative. Negative for chills and fever.    HENT: Negative. Negative for rhinorrhea and sore throat. Eyes: Negative. Negative for visual disturbance. Respiratory: Negative. Negative for cough, chest tightness, shortness of breath and wheezing. Cardiovascular: Negative. Negative for chest pain and palpitations. Gastrointestinal: Negative. Negative for abdominal pain, constipation, diarrhea, nausea and vomiting. Genitourinary: Negative. Negative for dysuria and hematuria. Musculoskeletal: Positive for arthralgias, gait problem and joint swelling. Negative for neck pain. Skin: Negative. Negative for rash. Allergic/Immunologic: Negative. Negative for environmental allergies and food allergies. Neurological: Negative for numbness and headaches. Psychiatric/Behavioral: Negative. Negative for suicidal ideas. Physical Exam   Physical Exam  Vitals signs reviewed. Constitutional:       General: He is not in acute distress. Appearance: He is well-developed. He is not diaphoretic. Comments: Pt appears in moderate discomfort, awake and alert sitting in wheelchair. HENT:      Head: Normocephalic and atraumatic. Nose: Nose normal.      Mouth/Throat:      Pharynx: Uvula midline. Eyes:      General:         Right eye: No discharge. Left eye: No discharge. Neck:      Musculoskeletal: Normal range of motion. Cardiovascular:      Rate and Rhythm: Normal rate. Heart sounds: Normal heart sounds. Pulmonary:      Effort: Pulmonary effort is normal. No respiratory distress. Breath sounds: Normal breath sounds. No wheezing or rales. Chest:      Chest wall: No tenderness. Abdominal:      General: Bowel sounds are normal.      Palpations: Abdomen is soft. Tenderness: There is no tenderness. There is no guarding. Comments: No CVA tenderness b/l. Musculoskeletal:         General: Swelling and tenderness present. Comments: R knee: + diffuse edema. No erythema or obvious bony deformity.  + diffuse TTP. Unable to perform ROM secondary to discomfort. + increase in warmth. R ankle: + diffuse edema. No erythema or obvious bony deformity. + diffuse TTP. Unable to perform ROM secondary to discomfort. +increase in warmth. 2+ D. P. pulses b/l.  R wrist: + diffuse edema. No erythema or obvious bony deformity. + diffuse TTP. Unable to perform ROM secondary to discomfort. 2+ D. P pulses b/l. + increase in warmth. Skin:     General: Skin is warm and dry. Coloration: Skin is not pale. Findings: No erythema or rash. Neurological:      Mental Status: He is alert and oriented to person, place, and time. Comments: No focal neuro deficits. Psychiatric:         Behavior: Behavior normal.         Diagnostic Study Results     Labs -   No results found for this or any previous visit (from the past 12 hour(s)). Radiologic Studies -   No orders to display         Medical Decision Making   I am the first provider for this patient. I reviewed the vital signs, available nursing notes, past medical history, past surgical history, family history and social history. Vital Signs-Reviewed the patient's vital signs. Patient Vitals for the past 12 hrs:   Temp Pulse Resp BP SpO2   12/30/19 1429 99.1 °F (37.3 °C) (!) 105 18 (!) 178/100 95 %             Provider Notes (Medical Decision Making):   Gout  Low suspicion of septic arthritis    ED Course:   Initial assessment performed. The patients presenting problems have been discussed, and they are in agreement with the care plan formulated and outlined with them. I have encouraged them to ask questions as they arise throughout their visit. CONSULT NOTE:   5:02 PM  HORTENSIA Villaseñor spoke with Dr. Roberto Tobias,   Specialty: PCP  Discussed pt's hx, disposition, and available diagnostic and imaging results. Reviewed care plans. Consultant agrees with plans as outlined.   Dr. Benoit Postin states if the patient is unable to ambulate and is unable to use crutches secondary to wrist pain and swelling, she advises for admission. She will try to send [facility] ED medical charts asap. Written by HORTENSIA Schafer      Disposition:  5:02pm    PLAN:  1. Admit to hospital     Diagnosis     Clinical Impression:   1. Acute pain of right knee    2. Right wrist pain    3. Acute right ankle pain    4. History of gout    5. Elevated blood pressure reading        Attestations:    HORTENSIA Schafer    Please note that this dictation was completed with [employer], the computer voice recognition software. Quite often unanticipated grammatical, syntax, homophones, and other interpretive errors are inadvertently transcribed by the computer software. Please disregard these errors. Please excuse any errors that have escaped final proofreading. Thank you. This note will not be viewable in [address]

## 2019-12-31 PROBLEM — M10.9 GOUT ATTACK: Status: ACTIVE | Noted: 2019-12-31

## 2019-12-31 LAB
ANION GAP SERPL CALC-SCNC: 6 MMOL/L (ref 5–15)
BUN SERPL-MCNC: 14 MG/DL (ref 6–20)
BUN/CREAT SERPL: 11 (ref 12–20)
CALCIUM SERPL-MCNC: 9.6 MG/DL (ref 8.5–10.1)
CHLORIDE SERPL-SCNC: 103 MMOL/L (ref 97–108)
CO2 SERPL-SCNC: 29 MMOL/L (ref 21–32)
CREAT SERPL-MCNC: 1.31 MG/DL (ref 0.7–1.3)
ERYTHROCYTE [DISTWIDTH] IN BLOOD BY AUTOMATED COUNT: 13.1 % (ref 11.5–14.5)
GLUCOSE BLD STRIP.AUTO-MCNC: 152 MG/DL (ref 65–100)
GLUCOSE BLD STRIP.AUTO-MCNC: 175 MG/DL (ref 65–100)
GLUCOSE BLD STRIP.AUTO-MCNC: 206 MG/DL (ref 65–100)
GLUCOSE BLD STRIP.AUTO-MCNC: 206 MG/DL (ref 65–100)
GLUCOSE BLD STRIP.AUTO-MCNC: 274 MG/DL (ref 65–100)
GLUCOSE BLD STRIP.AUTO-MCNC: 329 MG/DL (ref 65–100)
GLUCOSE SERPL-MCNC: 202 MG/DL (ref 65–100)
HCT VFR BLD AUTO: 40.6 % (ref 36.6–50.3)
HGB BLD-MCNC: 13 G/DL (ref 12.1–17)
MCH RBC QN AUTO: 28.6 PG (ref 26–34)
MCHC RBC AUTO-ENTMCNC: 32 G/DL (ref 30–36.5)
MCV RBC AUTO: 89.2 FL (ref 80–99)
NRBC # BLD: 0 K/UL (ref 0–0.01)
NRBC BLD-RTO: 0 PER 100 WBC
PLATELET # BLD AUTO: 289 K/UL (ref 150–400)
PMV BLD AUTO: 9.6 FL (ref 8.9–12.9)
POTASSIUM SERPL-SCNC: 4.2 MMOL/L (ref 3.5–5.1)
RBC # BLD AUTO: 4.55 M/UL (ref 4.1–5.7)
SERVICE CMNT-IMP: ABNORMAL
SODIUM SERPL-SCNC: 138 MMOL/L (ref 136–145)
WBC # BLD AUTO: 10.4 K/UL (ref 4.1–11.1)

## 2019-12-31 PROCEDURE — 85027 COMPLETE CBC AUTOMATED: CPT

## 2019-12-31 PROCEDURE — 36415 COLL VENOUS BLD VENIPUNCTURE: CPT

## 2019-12-31 PROCEDURE — 74011636637 HC RX REV CODE- 636/637: Performed by: INTERNAL MEDICINE

## 2019-12-31 PROCEDURE — 99218 HC RM OBSERVATION: CPT

## 2019-12-31 PROCEDURE — 74011250636 HC RX REV CODE- 250/636: Performed by: INTERNAL MEDICINE

## 2019-12-31 PROCEDURE — 74011250637 HC RX REV CODE- 250/637: Performed by: INTERNAL MEDICINE

## 2019-12-31 PROCEDURE — 74011250637 HC RX REV CODE- 250/637: Performed by: HOSPITALIST

## 2019-12-31 PROCEDURE — 80048 BASIC METABOLIC PNL TOTAL CA: CPT

## 2019-12-31 RX ORDER — INDOMETHACIN 75 MG/1
75 CAPSULE, EXTENDED RELEASE ORAL DAILY
COMMUNITY
End: 2020-01-11

## 2019-12-31 RX ORDER — AMLODIPINE BESYLATE 5 MG/1
10 TABLET ORAL DAILY
Status: DISCONTINUED | OUTPATIENT
Start: 2020-01-01 | End: 2020-01-07

## 2019-12-31 RX ORDER — GABAPENTIN 300 MG/1
300 CAPSULE ORAL 3 TIMES DAILY
Status: ON HOLD | COMMUNITY
End: 2020-01-11 | Stop reason: SDUPTHER

## 2019-12-31 RX ORDER — ATORVASTATIN CALCIUM 10 MG/1
10 TABLET, FILM COATED ORAL DAILY
COMMUNITY

## 2019-12-31 RX ORDER — TRAZODONE HYDROCHLORIDE 50 MG/1
50 TABLET ORAL
Status: DISCONTINUED | OUTPATIENT
Start: 2019-12-31 | End: 2020-01-09

## 2019-12-31 RX ORDER — AMLODIPINE BESYLATE 10 MG/1
10 TABLET ORAL DAILY
COMMUNITY
End: 2020-01-11

## 2019-12-31 RX ORDER — ATORVASTATIN CALCIUM 10 MG/1
10 TABLET, FILM COATED ORAL DAILY
Status: DISCONTINUED | OUTPATIENT
Start: 2020-01-01 | End: 2020-01-11 | Stop reason: HOSPADM

## 2019-12-31 RX ORDER — HYDRALAZINE HYDROCHLORIDE 20 MG/ML
10 INJECTION INTRAMUSCULAR; INTRAVENOUS
Status: DISCONTINUED | OUTPATIENT
Start: 2019-12-31 | End: 2020-01-11 | Stop reason: HOSPADM

## 2019-12-31 RX ORDER — GABAPENTIN 300 MG/1
300 CAPSULE ORAL 3 TIMES DAILY
Status: DISCONTINUED | OUTPATIENT
Start: 2019-12-31 | End: 2020-01-11 | Stop reason: HOSPADM

## 2019-12-31 RX ORDER — TRAMADOL HYDROCHLORIDE 50 MG/1
50 TABLET ORAL
Status: DISCONTINUED | OUTPATIENT
Start: 2019-12-31 | End: 2020-01-11 | Stop reason: HOSPADM

## 2019-12-31 RX ORDER — OXYCODONE HYDROCHLORIDE 5 MG/1
5 TABLET ORAL
Status: DISCONTINUED | OUTPATIENT
Start: 2019-12-31 | End: 2020-01-11 | Stop reason: HOSPADM

## 2019-12-31 RX ORDER — OXYCODONE HYDROCHLORIDE 5 MG/1
5 TABLET ORAL
Status: ON HOLD | COMMUNITY
End: 2020-01-11 | Stop reason: SDUPTHER

## 2019-12-31 RX ADMIN — ACETAMINOPHEN 650 MG: 160 SUSPENSION ORAL at 17:04

## 2019-12-31 RX ADMIN — ASPIRIN 81 MG CHEWABLE TABLET 81 MG: 81 TABLET CHEWABLE at 08:38

## 2019-12-31 RX ADMIN — INSULIN LISPRO 3 UNITS: 100 INJECTION, SOLUTION INTRAVENOUS; SUBCUTANEOUS at 17:05

## 2019-12-31 RX ADMIN — ACETAMINOPHEN 650 MG: 160 SUSPENSION ORAL at 11:47

## 2019-12-31 RX ADMIN — GABAPENTIN 300 MG: 300 CAPSULE ORAL at 15:55

## 2019-12-31 RX ADMIN — TAMSULOSIN HYDROCHLORIDE 0.4 MG: 0.4 CAPSULE ORAL at 08:37

## 2019-12-31 RX ADMIN — INSULIN LISPRO 2 UNITS: 100 INJECTION, SOLUTION INTRAVENOUS; SUBCUTANEOUS at 11:48

## 2019-12-31 RX ADMIN — Medication 10 ML: at 21:45

## 2019-12-31 RX ADMIN — OXYCODONE 5 MG: 5 TABLET ORAL at 20:16

## 2019-12-31 RX ADMIN — INSULIN LISPRO 2 UNITS: 100 INJECTION, SOLUTION INTRAVENOUS; SUBCUTANEOUS at 21:43

## 2019-12-31 RX ADMIN — ENOXAPARIN SODIUM 40 MG: 40 INJECTION SUBCUTANEOUS at 20:17

## 2019-12-31 RX ADMIN — Medication 10 ML: at 13:33

## 2019-12-31 RX ADMIN — DOCUSATE SODIUM 100 MG: 100 CAPSULE, LIQUID FILLED ORAL at 08:38

## 2019-12-31 RX ADMIN — TRAZODONE HYDROCHLORIDE 50 MG: 50 TABLET ORAL at 23:29

## 2019-12-31 RX ADMIN — LOSARTAN POTASSIUM 50 MG: 50 TABLET, FILM COATED ORAL at 11:42

## 2019-12-31 RX ADMIN — INSULIN LISPRO 2 UNITS: 100 INJECTION, SOLUTION INTRAVENOUS; SUBCUTANEOUS at 08:38

## 2019-12-31 RX ADMIN — TRAZODONE HYDROCHLORIDE 50 MG: 50 TABLET ORAL at 04:00

## 2019-12-31 RX ADMIN — COLCHICINE 0.6 MG: 0.6 TABLET, FILM COATED ORAL at 11:42

## 2019-12-31 RX ADMIN — GABAPENTIN 300 MG: 300 CAPSULE ORAL at 21:44

## 2019-12-31 RX ADMIN — PREDNISONE 40 MG: 20 TABLET ORAL at 08:37

## 2019-12-31 NOTE — PROGRESS NOTES
**Consult Information**  Member Facility: [facility] Kirstin Conrad MRN: [id number]  Consult ID: [id number]  Facility Time Zone: ET  Date and Time of Request: 12/31/2019 02:56:35 AM  Requesting Clinician: Faith Bailey  Patient Name: Bethany Murguia  YOB: 1960  Gender: Male    **Clinical Note**  Clinical Note: Will order patient's pm  Trazodone. **Attestation**  Interaction Mode: Phone Only  Phone Duration (mins): 1  Time of Call : 12/31/2019 03:25 AM  Interaction Attestation: Interprofessional internet consultation was delivered through telephonic and/or electronic communication upon the request of the patients treating physician, while the requesting and the rendering provider were not in the same physical location. Written report was provided to the requesting provider.   Evaluation Duration (mins): 2    **Physician Signature**  This document was electronically signed by: Pat Valdovinos MD  12/31/2019 03:25 AM

## 2019-12-31 NOTE — CONSULTS
ORTHOPAEDIC CONSULT NOTE    Subjective:     Date of Consultation:  December 30, 2019      Karina Sinclair is a 61 y.o. male who is being seen for right knee pain, difficulty ambulating . Pt reports onset of symptoms 12/24 pain started about the right knee then migrated to his right ankle-- then R wrist/ hand/elbow. States his pain is so severe he cant get around his home. Ambulates at baseline unassisted. Denies F/C/Flu like symptoms. Seen by PCP- Dec 13, 2019 started on Indomethacin. Seen in the [address] [place] [address] ED 12/26- states they were unable to get fluid out of his knee- given oxy- NO DX    + HX of gout  Patient reports his PCP is currently working him up for Lupus or Rheumatory Arthritis. Patient Active Problem List    Diagnosis Date Noted    Acute infective polyarth[facility] ([facility] 12/30/2019     Family History   Problem Relation Age of Onset    Hypertension Mother     Diabetes Mother     Heart Disease Father       Social History     Tobacco Use    Smoking status: Never Smoker    Smokeless tobacco: Never Used   Substance Use Topics    Alcohol use: Yes     Comment: occassionally \"weekends\"     Past Medical History:   Diagnosis Date    Arthritis     Chronic pain     Diabetes ([facility]     GERD (gastroesophageal reflux disease)     Gout     Hypertension     Other ill-defined conditions(799.89)     gout      Past Surgical History:   Procedure Laterality Date    HX APPENDECTOMY      HX ORTHOPAEDIC      RIGHT knee scope      Prior to Admission medications    Medication Sig Start Date End Date Taking? Authorizing Provider   colchicine 0.6 mg tablet Take 0.6 mg by mouth daily. Lia Anaya MD   methylPREDNISolone (MEDROL, YOSELIN,) 4 mg tablet Follow instructions on taper pack. 9/1/19   Key Villela Doctor, PA   methocarbamol (ROBAXIN) 750 mg tablet Take 1 Tab by mouth four (4) times daily.  5/29/19   Supa Erickson, NP   NIFEdipine ER (ADALAT CC) 90 mg ER tablet TK 1 T PO D 1/24/19   Lia Anaya MD   raNITIdine hcl 150 mg capsule Take 150 mg by mouth two (2) times a day. Lia Anaya MD   melatonin 5 mg cap capsule Take 5 mg by mouth nightly. Lia Anaya MD   chlorthalidone (HYGROTEN) 25 mg tablet Take  by mouth daily. Lia Anaya MD   traZODone (DESYREL) 50 mg tablet Take 50 mg by mouth as needed for Sleep. Lia Anaya MD   irbesartan (AVAPRO) 300 mg tablet Take 300 mg by mouth daily. Lia Anaya MD   tamsulosin (FLOMAX) 0.4 mg capsule Take 0.4 mg by mouth daily. Lia Anaya MD   aspirin 81 mg chewable tablet Take 81 mg by mouth daily. Lia Anaya MD   sulfaSALAzine (AZULFIDINE) 500 mg tablet Take 500 mg by mouth four (4) times daily. Lia Anaya MD   glipiZIDE (GLUCOTROL) 5 mg tablet Take  by mouth daily. Provider, Historical   metFORMIN (GLUCOPHAGE) 500 mg tablet Take  by mouth two (2) times daily (with meals). Provider, Historical   colchicine-probenecid (COLBENEMID) 0.5-500 mg per tablet Take 1 Tab by mouth two (2) times a day. Lia Anaya MD   NIFEdipine XL (PROCARDIA-XL) 60 mg CR tablet Take 60 mg by mouth daily.       Lai Anaya MD     Current Facility-Administered Medications   Medication Dose Route Frequency    sodium chloride (NS) flush 5-40 mL  5-40 mL IntraVENous Q8H    sodium chloride (NS) flush 5-40 mL  5-40 mL IntraVENous PRN    [START ON 12/31/2019] aspirin chewable tablet 81 mg  81 mg Oral DAILY    [START ON 12/31/2019] colchicine tablet 0.6 mg  0.6 mg Oral DAILY    [START ON 12/31/2019] tamsulosin (FLOMAX) capsule 0.4 mg  0.4 mg Oral DAILY    sodium chloride (NS) flush 5-40 mL  5-40 mL IntraVENous Q8H    sodium chloride (NS) flush 5-40 mL  5-40 mL IntraVENous PRN    acetaminophen (TYLENOL) solution 650 mg  650 mg Oral Q4H PRN    docusate sodium (COLACE) capsule 100 mg  100 mg Oral BID    enoxaparin (LOVENOX) injection 40 mg  40 mg SubCUTAneous Q24H    0.9% sodium chloride infusion  100 mL/hr IntraVENous CONTINUOUS    [START ON 12/31/2019] predniSONE (DELTASONE) tablet 40 mg  40 mg Oral DAILY WITH BREAKFAST    insulin lispro (HUMALOG) injection   SubCUTAneous AC&HS    glucose chewable tablet 16 g  4 Tab Oral PRN    glucagon (GLUCAGEN) injection 1 mg  1 mg IntraMUSCular PRN    [START ON 12/31/2019] losartan (COZAAR) tablet 50 mg  50 mg Oral DAILY    lidocaine-EPINEPHrine (XYLOCAINE) 2 %-1:100,000 injection 30 mg  1.5 mL SubCUTAneous ONCE    lidocaine (PF) (XYLOCAINE) 10 mg/mL (1 %) injection 10 mL  10 mL SubCUTAneous ONCE     Current Outpatient Medications   Medication Sig    colchicine 0.6 mg tablet Take 0.6 mg by mouth daily.  methylPREDNISolone (MEDROL, YOSELIN,) 4 mg tablet Follow instructions on taper pack.  methocarbamol (ROBAXIN) 750 mg tablet Take 1 Tab by mouth four (4) times daily.  NIFEdipine ER (ADALAT CC) 90 mg ER tablet TK 1 T PO D    raNITIdine hcl 150 mg capsule Take 150 mg by mouth two (2) times a day.  melatonin 5 mg cap capsule Take 5 mg by mouth nightly.  chlorthalidone (HYGROTEN) 25 mg tablet Take  by mouth daily.  traZODone (DESYREL) 50 mg tablet Take 50 mg by mouth as needed for Sleep.  irbesartan (AVAPRO) 300 mg tablet Take 300 mg by mouth daily.  tamsulosin (FLOMAX) 0.4 mg capsule Take 0.4 mg by mouth daily.  aspirin 81 mg chewable tablet Take 81 mg by mouth daily.  sulfaSALAzine (AZULFIDINE) 500 mg tablet Take 500 mg by mouth four (4) times daily.  glipiZIDE (GLUCOTROL) 5 mg tablet Take  by mouth daily.  metFORMIN (GLUCOPHAGE) 500 mg tablet Take  by mouth two (2) times daily (with meals).  colchicine-probenecid (COLBENEMID) 0.5-500 mg per tablet Take 1 Tab by mouth two (2) times a day.  NIFEdipine XL (PROCARDIA-XL) 60 mg CR tablet Take 60 mg by mouth daily. Allergies   Allergen Reactions    Uloric [Febuxostat] Anaphylaxis    Allopurinol Hives        Review of Systems:  A comprehensive review of systems was negative except for that written in the HPI.     Mental Status: no dementia    Objective:     Patient Vitals for the past 8 hrs:   BP Temp Pulse Resp SpO2 Height Weight   19 (!) 165/105 98.7 °F (37.1 °C) 91 18 95 %     19 1827 (!) 155/102  91 16 94 %     19 1429 (!) 178/100 99.1 °F (37.3 °C) (!) 105 18 95 % 5' 6\" (1.676 m) 108 kg (238 lb)     Temp (24hrs), Av.9 °F (37.2 °C), Min:98.7 °F (37.1 °C), Max:99.1 °F (37.3 °C)      Gen: Well-developed,  in no acute distress   HEENT: Pink conjunctivae, hearing intact to voice, moist mucous membranes   Neck: Supple  Resp: No respiratory distress   Card: RRR, palpable distal pulse-equal bilaterally, birsk cap refill all distal digits   Abd:  non-distended  Musc: right upper and lower ext joints acutely tender, minimal swelling. Right knee w min effusion. Knee held flex 120- POC. ROM guarded and painful (110-140)  NO erythema or streaking   Skin: No skin breakdown noted. Skin warm, pink, dry  Neuro: Cranial nerves are grossly intact, no focal motor weakness, follows commands appropriately   Psych: Good insight, oriented to person, place and time, alert      Labs:   Recent Results (from the past 24 hour(s))   CBC WITH AUTOMATED DIFF    Collection Time: 19  5:15 PM   Result Value Ref Range    WBC 11.4 (H) 4.1 - 11.1 K/uL    RBC 4.87 4.10 - 5.70 M/uL    HGB 14.0 12.1 - 17.0 g/dL    HCT 43.7 36.6 - 50.3 %    MCV 89.7 80.0 - 99.0 FL    MCH 28.7 26.0 - 34.0 PG    MCHC 32.0 30.0 - 36.5 g/dL    RDW 13.3 11.5 - 14.5 %    PLATELET 424 904 - 166 K/uL    MPV 9.4 8.9 - 12.9 FL    NRBC 0.0 0  WBC    ABSOLUTE NRBC 0.00 0.00 - 0.01 K/uL    NEUTROPHILS 77 (H) 32 - 75 %    LYMPHOCYTES 12 12 - 49 %    MONOCYTES 11 5 - 13 %    EOSINOPHILS 0 0 - 7 %    BASOPHILS 0 0 - 1 %    IMMATURE GRANULOCYTES 0 0.0 - 0.5 %    ABS. NEUTROPHILS 8.7 (H) 1.8 - 8.0 K/UL    ABS. LYMPHOCYTES 1.4 0.8 - 3.5 K/UL    ABS. MONOCYTES 1.2 (H) 0.0 - 1.0 K/UL    ABS. EOSINOPHILS 0.0 0.0 - 0.4 K/UL    ABS. BASOPHILS 0.0 0.0 - 0.1 K/UL    ABS. IMM. GRANS. 0.1 (H) 0.00 - 0.04 K/UL    DF AUTOMATED     METABOLIC PANEL, COMPREHENSIVE    Collection Time: 12/30/19  5:15 PM   Result Value Ref Range    Sodium 137 136 - 145 mmol/L    Potassium 4.4 3.5 - 5.1 mmol/L    Chloride 102 97 - 108 mmol/L    CO2 30 21 - 32 mmol/L    Anion gap 5 5 - 15 mmol/L    Glucose 136 (H) 65 - 100 mg/dL    BUN 13 6 - 20 MG/DL    Creatinine 1.25 0.70 - 1.30 MG/DL    BUN/Creatinine ratio 10 (L) 12 - 20      GFR est AA >60 >60 ml/min/1.73m2    GFR est non-AA 59 (L) >60 ml/min/1.73m2    Calcium 10.5 (H) 8.5 - 10.1 MG/DL    Bilirubin, total 0.6 0.2 - 1.0 MG/DL    ALT (SGPT) 66 12 - 78 U/L    AST (SGOT) 28 15 - 37 U/L    Alk. phosphatase 84 45 - 117 U/L    Protein, total 8.5 (H) 6.4 - 8.2 g/dL    Albumin 3.8 3.5 - 5.0 g/dL    Globulin 4.7 (H) 2.0 - 4.0 g/dL    A-G Ratio 0.8 (L) 1.1 - 2.2     URIC ACID    Collection Time: 12/30/19  5:15 PM   Result Value Ref Range    Uric acid 7.5 (H) 3.5 - 7.2 MG/DL   SED RATE (ESR)    Collection Time: 12/30/19  5:15 PM   Result Value Ref Range    Sed rate, automated 14 0 - 20 mm/hr   C REACTIVE PROTEIN, QT    Collection Time: 12/30/19  5:15 PM   Result Value Ref Range    C-Reactive protein 10.20 (H) 0.00 - 0.60 mg/dL   SED RATE (ESR)    Collection Time: 12/30/19  8:50 PM   Result Value Ref Range    Sed rate, automated 27 (H) 0 - 20 mm/hr   GLUCOSE, POC    Collection Time: 12/30/19  9:04 PM   Result Value Ref Range    Glucose (POC) 352 (H) 65 - 100 mg/dL    Performed by [id number], POC    Collection Time: 12/30/19 10:03 PM   Result Value Ref Range    Glucose (POC) 383 (H) 65 - 100 mg/dL    Performed by Yolette Flores          Impression:     Patient Active Problem List    Diagnosis Date Noted    Acute infective polyarthritis ([facility] 12/30/2019     Active Problems:    Acute infective polyarthritis ([facility] (12/30/2019)        Plan:     Right knee joint aspiration; procedure described to pt, risk and benefits reviewed.  Consent signed by pt's sig other at his request due to right hand and wrist pain. Under sterile condition 3cc lidocaine injected into sub-q tissues right knee, via superior lateral patellar approach, second attempt via anterior lateral joint line  Joint aspirated with 18g on a 60cc syringe. Minimal fluid removed. Attempted levage w/o sig results. culture, and crystal analysis ordered. Band-aid applied  There were no complication pt tolerated it well  Vitals stable before and after procedure    Right knee xrays ordered. Infection unlikely - Pt afebrile, No sig joint effusion  Recommend treating inflammatory causes/gout like condition  Ice and elevation  Will follow      Dr. Radha Jones aware and agrees with plan as above.         Obey Baker PA-C  [employer]

## 2019-12-31 NOTE — PROGRESS NOTES
Bedside and Verbal shift change report given to [facility] (oncoming nurse) by Jacklyn Calloway (offgoing nurse). Report included the following information SBAR, Kardex, Intake/Output, MAR and Recent Results.

## 2019-12-31 NOTE — ED NOTES
Report given to Nikki Haro RN. They were informed of patient chief complaint, current status, orders completed (to include IV access/medications/radiology testing), outstanding orders that still need to be completed, and the treatment plan. Ensured no questions or concerns regarding the patient prior to departure.

## 2019-12-31 NOTE — PROGRESS NOTES
Hospitalist Progress Note    NAME: Tish Olivier   :  1960   MRN:  [id number]       Assessment / Plan:  Acute polyarthritis  -XR of R knee showed severe osteoarthritis  -s/p arthrocentesis, cultures pending  -failed NSAIDs outpt  -cont' PO prednisone, colchicine  -allopurinol after resolution of acute flare  -ortho following    HTN  -resume losartan  -hydralazine prn  -stop IVF    T2DM  -repeat A1C  -hold metfomin  -SSI    CKD 3  -cr stable    Obesity    Body mass index is 38.41 kg/m². Code status: Full  Prophylaxis: Lovenox  Recommended Disposition: Home w/Family     Subjective:     Chief Complaint / Reason for Physician Visit  Pt seen, in bed, NAD. States his R leg hurts with movement. improved some since admission. Discussed with RN events overnight. Review of Systems:  Symptom Y/N Comments  Symptom Y/N Comments   Fever/Chills    Chest Pain     Poor Appetite    Edema     Cough    Abdominal Pain     Sputum    Joint Pain y R knee and ankle   SOB/CASSIDY    Pruritis/Rash     Nausea/vomit    Tolerating PT/OT     Diarrhea    Tolerating Diet     Constipation    Other       Could NOT obtain due to:      Objective:     VITALS:   Last 24hrs VS reviewed since prior progress note.  Most recent are:  Patient Vitals for the past 24 hrs:   Temp Pulse Resp BP SpO2   19 0805 98.7 °F (37.1 °C)       19 0715    (!) 163/102 94 %   19 0700    (!) 150/91 95 %   19 0645    (!) 155/92 94 %   19 0630    160/89 95 %   19 0615    (!) 156/95 93 %   19 0600    144/82 93 %   19 0545    148/82 93 %   19 0530    156/83 94 %   19 0515    155/88 94 %   19 0500    141/90 94 %   19 0445    153/88 95 %   19 0430    160/89 97 %   19 0400    (!) 158/92 97 %   19 0345    146/90 97 %   19 0330    (!) 159/98 97 %   19 0315    (!) 144/94 97 %   19 0300    142/77 96 % 12/31/19 0245    146/75 96 %   12/31/19 0230    146/84 96 %   12/31/19 0215    145/79 95 %   12/31/19 0145    (!) 160/101 95 %   12/31/19 0130    (!) 158/98 96 %   12/31/19 0115    (!) 150/93 96 %   12/31/19 0001 98.6 °F (37 °C) 90 18 (!) 153/93 97 %   12/30/19 2013 98.7 °F (37.1 °C) 91 18 (!) 165/105 95 %   12/30/19 1827  91 16 (!) 155/102 94 %   12/30/19 1429 99.1 °F (37.3 °C) (!) 105 18 (!) 178/100 95 %     No intake or output data in the 24 hours ending 12/31/19 0907     PHYSICAL EXAM:  General: WD, WN. Alert, cooperative, no acute distress    EENT:  EOMI. Anicteric sclerae. MMM  Resp:  CTA bilaterally, no wheezing or rales. No accessory muscle use  CV:  Regular  rhythm,  No edema  GI:  Soft, Non distended, Non tender.  +Bowel sounds  Neurologic:  Alert and oriented X 3, normal speech,   Psych:   Good insight. Not anxious nor agitated  Skin:  No rashes. No jaundice    Reviewed most current lab test results and cultures  YES  Reviewed most current radiology test results   YES  Review and summation of old records today    NO  Reviewed patient's current orders and MAR    YES  PMH/SH reviewed - no change compared to H&P  ________________________________________________________________________  Care Plan discussed with:    Comments   Patient x    Family      RN x    Care Manager     Consultant                        Multidiciplinary team rounds were held today with , nursing, pharmacist and clinical coordinator. Patient's plan of care was discussed; medications were reviewed and discharge planning was addressed.      ________________________________________________________________________  Total NON critical care TIME:  35   Minutes    Total CRITICAL CARE TIME Spent:   Minutes non procedure based      Comments   >50% of visit spent in counseling and coordination of care     ________________________________________________________________________  Kylie Murguia, MD     Procedures: see electronic medical records for all procedures/Xrays and details which were not copied into this note but were reviewed prior to creation of Plan. LABS:  I reviewed today's most current labs and imaging studies.   Pertinent labs include:  Recent Labs     12/31/19 0413 12/30/19  1715   WBC 10.4 11.4*   HGB 13.0 14.0   HCT 40.6 43.7    285     Recent Labs     12/31/19 0413 12/30/19  1715    137   K 4.2 4.4    102   CO2 29 30   * 136*   BUN 14 13   CREA 1.31* 1.25   CA 9.6 10.5*   ALB  --  3.8   TBILI  --  0.6   SGOT  --  28   ALT  --  66       Signed: Gerda Luque MD

## 2019-12-31 NOTE — ED NOTES
Report received from [facility], [address] [place] [address]. They advised of the patient's chief complaint, current status, orders completed (to include IV access/medications/radiology testing), outstanding orders that still need to be completed, and the treatment plan. Questions asked and answered prior to assumption of care.

## 2019-12-31 NOTE — ED NOTES
Report given to [facility].  MD aware of patients rising BG and ordered insulin

## 2019-12-31 NOTE — PROGRESS NOTES
Pharmacy Clarification of the Prior to Admission Medication Regimen Retrospective to the Admission Medication Reconciliation    The patient was interviewed regarding clarification of the prior to admission medication regimen and was questioned regarding use of any other inhalers, topical products, over the counter medications, herbal medications, vitamin products or ophthalmic/nasal/otic medication use. Information Obtained From: RX Query, Patient    Recommendations/Findings: The following amendments were made to the patient's active medication list on file at [address]:     1) Additions:   amLODIPine (NORVASC) 10 mg tablet  atorvastatin (LIPITOR) 10 mg tablet  dulaglutide (TRULICITY) 1.5 EB/0.4 mL sub-q pen  gabapentin (NEURONTIN) 300 mg capsule  indomethacin SR (INDOCIN SR) 75 mg SR capsule  oxyCODONE IR (ROXICODONE) 5 mg immediate release tablet    2) Removals:   chlorthiadone  Colchicine-probenecid  glipizide  methocarbamol  Medrol dose pack  Nifedipine 60 mg  Ranitidine  Sulfasalazine    3) Changes:  metFORMIN (GLUCOPHAGE) tablet (Old regimen: (strength 500 mg) BID /New regimen: (strength 1,000 mg) 1,000 mg BID)    4) Pertinent Pharmacy Findings: None     PTA medication list was corrected to the following:     Prior to Admission Medications   Prescriptions Last Dose Informant Taking? NIFEdipine ER (ADALAT CC) 90 mg ER tablet 2019 at Unknown time Self Yes   Sig: Take 90 mg by mouth daily. amLODIPine (NORVASC) 10 mg tablet 2019 at Unknown time Self Yes   Sig: Take 10 mg by mouth daily. aspirin 81 mg chewable tablet 2019 at Unknown time Self Yes   Sig: Take 81 mg by mouth daily. atorvastatin (LIPITOR) 10 mg tablet 2019 at Unknown time Self Yes   Sig: Take 10 mg by mouth daily. colchicine 0.6 mg tablet 2019 at Unknown time Self Yes   Sig: Take 0.6 mg by mouth daily.    dulaglutide (TRULICITY) 1.5 IC/2.0 mL sub-q pen 2019 at Unknown time Self Yes   Si.5 mg by SubCUTAneous route every seven (7) days. gabapentin (NEURONTIN) 300 mg capsule 12/27/2019 at Unknown time Self Yes   Sig: Take 300 mg by mouth three (3) times daily. indomethacin SR (INDOCIN SR) 75 mg SR capsule 12/29/2019 at Unknown time Self Yes   Sig: Take 75 mg by mouth daily. irbesartan (AVAPRO) 300 mg tablet 12/29/2019 at Unknown time Self Yes   Sig: Take 300 mg by mouth daily. melatonin 5 mg cap capsule 12/29/2019 at Unknown time Self Yes   Sig: Take 5 mg by mouth nightly. metFORMIN (GLUCOPHAGE) 1,000 mg tablet 12/29/2019 at Unknown time Self Yes   Sig: Take 1,000 mg by mouth two (2) times daily (with meals). oxyCODONE IR (ROXICODONE) 5 mg immediate release tablet 12/27/2019 at Unknown time Self Yes   Sig: Take 5 mg by mouth every six (6) hours as needed for Pain.   tamsulosin (FLOMAX) 0.4 mg capsule 12/29/2019 at Unknown time Self Yes   Sig: Take 0.4 mg by mouth daily. traZODone (DESYREL) 50 mg tablet 12/29/2019 at Unknown time Self Yes   Sig: Take 50 mg by mouth as needed for Sleep.       Facility-Administered Medications: None          Thank you,  Dudley Alvarado  Medication History Pharmacy Technician

## 2019-12-31 NOTE — ED NOTES
Report given to [facility]. They were informed of patient chief complaint, current status, orders completed (to include IV access/medications/radiology testing), outstanding orders that still need to be completed, and the treatment plan. Ensured no questions or concerns regarding the patient prior to departure.

## 2019-12-31 NOTE — H&P
Hospitalist Admission Note    NAME: Dayron Wallis   :  1960   MRN:  [id number]     Date/Time:  2019 8:31 PM    Patient PCP: Modesto Reyes MD  ______________________________________________________________________  Given the patient's current clinical presentation, I have a high level of concern for decompensation if discharged from the emergency department. Complex decision making was performed, which includes reviewing the patient's available past medical records, laboratory results, and x-ray films. My assessment of this patient's clinical condition and my plan of care is as follows. Assessment / Plan:    Acute polyarthritis  · Right ankle, right elbow, right knee  · History of underlying gout  · Likely gout flare versus pseudogout  · Orthopedic consult for arthrocentesis  · Prednisone 40 mg p.o. daily for 7 days, colchicine 0.6 mg daily  · Failed NSAIDs in the past  · Follow-up blood sugar very closely given his underlying history of diabetes  · ESR  · Uric acid is not elevated but that does not mean anything! · After resolution of the acute flare start allopurinol with colchicine  · No underlying history of STDs, denies any new rash or any other symptoms of a connective tissue disease    History of hypertension  · Chronically on ACE inhibitor and hydrochlorothiazide, will hold due to risk of flaring his gout  · Start losartan    History of diabetes mellitus type 2  · Most recent A1c 9.1%   · Obtain a new hemoglobin A1c  · Hold metformin  · Correction scale and hypoglycemia protocol    Code Status: Full code  Surrogate Decision Maker: He wants to think about it    DVT Prophylaxis: Lovenox  GI Prophylaxis: not indicated    Baseline:  Active, moves around      Subjective:   CHIEF COMPLAINT: Right knee, elbow, ankle pain    HISTORY OF PRESENT ILLNESS:       Patient is a 61years old -American man with past medical history of gout presented to the emergency department due to worsening pain and swelling in the right ankle, elbow and knee. He reported that he went to [facility] last week for which she was given oxycodone and steroids in the ER but he was not discharged on any medications other than colchicine. He was told that he had arthrocentesis that time but he was never told the results. Patient also has a history of underlying hypertension for which he takes hydrochlorothiazide and ACE inhibitor. He does not smoke, drink alcohol or use illicit drugs. He also denies any history of STD and he is not sexually active right now. In the ER, uric acid was obtained. I asked for orthopedic consult for arthrocentesis. We were asked to admit for work up and evaluation of the above problems. Past Medical History:   Diagnosis Date    Arthritis     Chronic pain     Diabetes ([address]     GERD (gastroesophageal reflux disease)     Gout     Hypertension     Other ill-defined conditions(799.89)     gout        Past Surgical History:   Procedure Laterality Date    HX APPENDECTOMY      HX ORTHOPAEDIC      RIGHT knee scope       Social History     Tobacco Use    Smoking status: Never Smoker    Smokeless tobacco: Never Used   Substance Use Topics    Alcohol use: Yes     Comment: occassionally \"weekends\"        Family History   Problem Relation Age of Onset    Hypertension Mother     Diabetes Mother     Heart Disease Father      Allergies   Allergen Reactions    Uloric [Febuxostat] Anaphylaxis    Allopurinol Hives        Prior to Admission medications    Medication Sig Start Date End Date Taking? Authorizing Provider   colchicine 0.6 mg tablet Take 0.6 mg by mouth daily. Other, MD Lai   methylPREDNISolone (MEDROL, YOSELIN,) 4 mg tablet Follow instructions on taper pack. 9/1/19   Key Villela Doctor, PA   methocarbamol (ROBAXIN) 750 mg tablet Take 1 Tab by mouth four (4) times daily.  5/29/19   Supa Erickson NP   NIFEdipine ER (ADALAT CC) 90 mg ER tablet TK 1 T PO D 1/24/19   Lia Anaya MD   raNITIdine hcl 150 mg capsule Take 150 mg by mouth two (2) times a day. Lia Anaya MD   melatonin 5 mg cap capsule Take 5 mg by mouth nightly. Lia Anaya MD   chlorthalidone (HYGROTEN) 25 mg tablet Take  by mouth daily. Lia Anaya MD   traZODone (DESYREL) 50 mg tablet Take 50 mg by mouth as needed for Sleep. Lia Anaya MD   irbesartan (AVAPRO) 300 mg tablet Take 300 mg by mouth daily. Lia Anaya MD   tamsulosin (FLOMAX) 0.4 mg capsule Take 0.4 mg by mouth daily. Lia Anaya MD   aspirin 81 mg chewable tablet Take 81 mg by mouth daily. Lia Anaya MD   sulfaSALAzine (AZULFIDINE) 500 mg tablet Take 500 mg by mouth four (4) times daily. Lia Anaya MD   glipiZIDE (GLUCOTROL) 5 mg tablet Take  by mouth daily. Provider, Historical   metFORMIN (GLUCOPHAGE) 500 mg tablet Take  by mouth two (2) times daily (with meals). Provider, Historical   colchicine-probenecid (COLBENEMID) 0.5-500 mg per tablet Take 1 Tab by mouth two (2) times a day. Lia Anaya MD   NIFEdipine XL (PROCARDIA-XL) 60 mg CR tablet Take 60 mg by mouth daily. Lia Anaya MD       REVIEW OF SYSTEMS:     I am not able to complete the review of systems because:    The patient is intubated and sedated    The patient has altered mental status due to his acute medical problems    The patient has baseline aphasia from prior stroke(s)    The patient has baseline dementia and is not reliable historian    The patient is in acute medical distress and unable to provide information           Total of 12 systems reviewed as follows:       POSITIVE= underlined text  Negative = text not underlined  General:  fever, chills, sweats, generalized weakness, weight loss/gain,      loss of appetite   Eyes:    blurred vision, eye pain, loss of vision, double vision  ENT:    rhinorrhea, pharyngitis   Respiratory:   cough, sputum production, SOB, CASSIDY, wheezing, pleuritic pain Cardiology:   chest pain, palpitations, orthopnea, PND, edema, syncope   Gastrointestinal:  abdominal pain , N/V, diarrhea, dysphagia, constipation, bleeding   Genitourinary:  frequency, urgency, dysuria, hematuria, incontinence   Muskuloskeletal :  arthralgia, myalgia, back pain  Hematology:  easy bruising, nose or gum bleeding, lymphadenopathy   Dermatological: rash, ulceration, pruritis, color change / jaundice  Endocrine:   hot flashes or polydipsia   Neurological:  headache, dizziness, confusion, focal weakness, paresthesia,     Speech difficulties, memory loss, gait difficulty  Psychological: Feelings of anxiety, depression, agitation    Objective:   VITALS:    Visit Vitals  BP (!) 165/105 (BP 1 Location: Left arm, BP Patient Position: At rest)   Pulse 91   Temp 98.7 °F (37.1 °C)   Resp 18   Ht 5' 6\" (1.676 m)   Wt 108 kg (238 lb)   SpO2 95%   BMI 38.41 kg/m²       PHYSICAL EXAM:    General:    Alert, cooperative, no distress, appears stated age. HEENT: Atraumatic, anicteric sclerae, pink conjunctivae     No oral ulcers, mucosa moist, throat clear, dentition fair  Neck:  Supple, symmetrical,  thyroid: non tender  Lungs:   Clear to auscultation bilaterally. No Wheezing or Rhonchi. No rales. Chest wall:  No tenderness  No Accessory muscle use. Heart:   Regular  rhythm,  No  murmur   No edema  Abdomen:   Soft, non-tender. Not distended. Bowel sounds normal  Extremities: No cyanosis. No clubbing, right ankle, knee, elbow tenderness. Back right knee effusion. Skin turgor normal, Capillary refill normal, Radial dial pulse 2+  Skin:     Not pale. Not Jaundiced  No rashes   Psych:  Good insight. Not depressed. Not anxious or agitated. Neurologic: EOMs intact. No facial asymmetry. No aphasia or slurred speech. Symmetrical strength, Sensation grossly intact.  Alert and oriented X 4.     _______________________________________________________________________  Care Plan discussed with:    Comments Patient x    Family      RN     Care Manager                    Consultant:      _______________________________________________________________________  Expected  Disposition:   Home with Family x   HH/PT/OT/RN    SNF/LTC    GIULHERME    ________________________________________________________________________  TOTAL TIME:  48 Minutes    Critical Care Provided     Minutes non procedure based      Comments    x Reviewed previous records   >50% of visit spent in counseling and coordination of care x Discussion with patient and/or family and questions answered       ________________________________________________________________________  Signed: Mercedez Kang MD    Procedures: see electronic medical records for all procedures/Xrays and details which were not copied into this note but were reviewed prior to creation of Plan. LAB DATA REVIEWED:    Recent Results (from the past 24 hour(s))   CBC WITH AUTOMATED DIFF    Collection Time: 12/30/19  5:15 PM   Result Value Ref Range    WBC 11.4 (H) 4.1 - 11.1 K/uL    RBC 4.87 4.10 - 5.70 M/uL    HGB 14.0 12.1 - 17.0 g/dL    HCT 43.7 36.6 - 50.3 %    MCV 89.7 80.0 - 99.0 FL    MCH 28.7 26.0 - 34.0 PG    MCHC 32.0 30.0 - 36.5 g/dL    RDW 13.3 11.5 - 14.5 %    PLATELET 858 475 - 174 K/uL    MPV 9.4 8.9 - 12.9 FL    NRBC 0.0 0  WBC    ABSOLUTE NRBC 0.00 0.00 - 0.01 K/uL    NEUTROPHILS 77 (H) 32 - 75 %    LYMPHOCYTES 12 12 - 49 %    MONOCYTES 11 5 - 13 %    EOSINOPHILS 0 0 - 7 %    BASOPHILS 0 0 - 1 %    IMMATURE GRANULOCYTES 0 0.0 - 0.5 %    ABS. NEUTROPHILS 8.7 (H) 1.8 - 8.0 K/UL    ABS. LYMPHOCYTES 1.4 0.8 - 3.5 K/UL    ABS. MONOCYTES 1.2 (H) 0.0 - 1.0 K/UL    ABS. EOSINOPHILS 0.0 0.0 - 0.4 K/UL    ABS. BASOPHILS 0.0 0.0 - 0.1 K/UL    ABS. IMM.  GRANS. 0.1 (H) 0.00 - 0.04 K/UL    DF AUTOMATED     METABOLIC PANEL, COMPREHENSIVE    Collection Time: 12/30/19  5:15 PM   Result Value Ref Range    Sodium 137 136 - 145 mmol/L    Potassium 4.4 3.5 - 5.1 mmol/L    Chloride 102 97 - 108 mmol/L    CO2 30 21 - 32 mmol/L    Anion gap 5 5 - 15 mmol/L    Glucose 136 (H) 65 - 100 mg/dL    BUN 13 6 - 20 MG/DL    Creatinine 1.25 0.70 - 1.30 MG/DL    BUN/Creatinine ratio 10 (L) 12 - 20      GFR est AA >60 >60 ml/min/1.73m2    GFR est non-AA 59 (L) >60 ml/min/1.73m2    Calcium 10.5 (H) 8.5 - 10.1 MG/DL    Bilirubin, total 0.6 0.2 - 1.0 MG/DL    ALT (SGPT) 66 12 - 78 U/L    AST (SGOT) 28 15 - 37 U/L    Alk.  phosphatase 84 45 - 117 U/L    Protein, total 8.5 (H) 6.4 - 8.2 g/dL    Albumin 3.8 3.5 - 5.0 g/dL    Globulin 4.7 (H) 2.0 - 4.0 g/dL    A-G Ratio 0.8 (L) 1.1 - 2.2     URIC ACID    Collection Time: 12/30/19  5:15 PM   Result Value Ref Range    Uric acid 7.5 (H) 3.5 - 7.2 MG/DL   SED RATE (ESR)    Collection Time: 12/30/19  5:15 PM   Result Value Ref Range    Sed rate, automated 14 0 - 20 mm/hr

## 2020-01-01 LAB
ANION GAP SERPL CALC-SCNC: 5 MMOL/L (ref 5–15)
BUN SERPL-MCNC: 18 MG/DL (ref 6–20)
BUN/CREAT SERPL: 14 (ref 12–20)
CALCIUM SERPL-MCNC: 9.8 MG/DL (ref 8.5–10.1)
CHLORIDE SERPL-SCNC: 106 MMOL/L (ref 97–108)
CO2 SERPL-SCNC: 30 MMOL/L (ref 21–32)
CREAT SERPL-MCNC: 1.25 MG/DL (ref 0.7–1.3)
ERYTHROCYTE [DISTWIDTH] IN BLOOD BY AUTOMATED COUNT: 13.2 % (ref 11.5–14.5)
GLUCOSE BLD STRIP.AUTO-MCNC: 139 MG/DL (ref 65–100)
GLUCOSE BLD STRIP.AUTO-MCNC: 217 MG/DL (ref 65–100)
GLUCOSE BLD STRIP.AUTO-MCNC: 238 MG/DL (ref 65–100)
GLUCOSE BLD STRIP.AUTO-MCNC: 256 MG/DL (ref 65–100)
GLUCOSE BLD STRIP.AUTO-MCNC: 299 MG/DL (ref 65–100)
GLUCOSE SERPL-MCNC: 177 MG/DL (ref 65–100)
HCT VFR BLD AUTO: 40.7 % (ref 36.6–50.3)
HGB BLD-MCNC: 13.2 G/DL (ref 12.1–17)
MCH RBC QN AUTO: 28.9 PG (ref 26–34)
MCHC RBC AUTO-ENTMCNC: 32.4 G/DL (ref 30–36.5)
MCV RBC AUTO: 89.1 FL (ref 80–99)
NRBC # BLD: 0 K/UL (ref 0–0.01)
NRBC BLD-RTO: 0 PER 100 WBC
PLATELET # BLD AUTO: 298 K/UL (ref 150–400)
PMV BLD AUTO: 9.4 FL (ref 8.9–12.9)
POTASSIUM SERPL-SCNC: 4 MMOL/L (ref 3.5–5.1)
RBC # BLD AUTO: 4.57 M/UL (ref 4.1–5.7)
SERVICE CMNT-IMP: ABNORMAL
SODIUM SERPL-SCNC: 141 MMOL/L (ref 136–145)
WBC # BLD AUTO: 14.3 K/UL (ref 4.1–11.1)

## 2020-01-01 PROCEDURE — 74011250637 HC RX REV CODE- 250/637: Performed by: HOSPITALIST

## 2020-01-01 PROCEDURE — 74011636637 HC RX REV CODE- 636/637: Performed by: INTERNAL MEDICINE

## 2020-01-01 PROCEDURE — 36415 COLL VENOUS BLD VENIPUNCTURE: CPT

## 2020-01-01 PROCEDURE — 99218 HC RM OBSERVATION: CPT

## 2020-01-01 PROCEDURE — 74011250636 HC RX REV CODE- 250/636: Performed by: INTERNAL MEDICINE

## 2020-01-01 PROCEDURE — 82962 GLUCOSE BLOOD TEST: CPT

## 2020-01-01 PROCEDURE — 85027 COMPLETE CBC AUTOMATED: CPT

## 2020-01-01 PROCEDURE — 74011250637 HC RX REV CODE- 250/637: Performed by: INTERNAL MEDICINE

## 2020-01-01 PROCEDURE — 80048 BASIC METABOLIC PNL TOTAL CA: CPT

## 2020-01-01 RX ORDER — MORPHINE SULFATE 2 MG/ML
1 INJECTION, SOLUTION INTRAMUSCULAR; INTRAVENOUS
Status: DISCONTINUED | OUTPATIENT
Start: 2020-01-01 | End: 2020-01-11 | Stop reason: HOSPADM

## 2020-01-01 RX ORDER — GLIPIZIDE 5 MG/1
5 TABLET ORAL
Status: DISCONTINUED | OUTPATIENT
Start: 2020-01-01 | End: 2020-01-01

## 2020-01-01 RX ADMIN — GABAPENTIN 300 MG: 300 CAPSULE ORAL at 21:18

## 2020-01-01 RX ADMIN — DOCUSATE SODIUM 100 MG: 100 CAPSULE, LIQUID FILLED ORAL at 16:44

## 2020-01-01 RX ADMIN — Medication 10 ML: at 06:00

## 2020-01-01 RX ADMIN — LOSARTAN POTASSIUM 50 MG: 50 TABLET, FILM COATED ORAL at 08:26

## 2020-01-01 RX ADMIN — TRAMADOL HYDROCHLORIDE 50 MG: 50 TABLET, FILM COATED ORAL at 13:19

## 2020-01-01 RX ADMIN — GABAPENTIN 300 MG: 300 CAPSULE ORAL at 08:26

## 2020-01-01 RX ADMIN — AMLODIPINE BESYLATE 10 MG: 5 TABLET ORAL at 08:26

## 2020-01-01 RX ADMIN — TRAZODONE HYDROCHLORIDE 50 MG: 50 TABLET ORAL at 23:38

## 2020-01-01 RX ADMIN — INSULIN LISPRO 5 UNITS: 100 INJECTION, SOLUTION INTRAVENOUS; SUBCUTANEOUS at 12:00

## 2020-01-01 RX ADMIN — MORPHINE SULFATE 1 MG: 2 INJECTION, SOLUTION INTRAMUSCULAR; INTRAVENOUS at 21:18

## 2020-01-01 RX ADMIN — MORPHINE SULFATE 1 MG: 2 INJECTION, SOLUTION INTRAMUSCULAR; INTRAVENOUS at 12:00

## 2020-01-01 RX ADMIN — INSULIN LISPRO 2 UNITS: 100 INJECTION, SOLUTION INTRAVENOUS; SUBCUTANEOUS at 22:15

## 2020-01-01 RX ADMIN — PREDNISONE 40 MG: 20 TABLET ORAL at 08:25

## 2020-01-01 RX ADMIN — TRAMADOL HYDROCHLORIDE 50 MG: 50 TABLET, FILM COATED ORAL at 06:58

## 2020-01-01 RX ADMIN — Medication 10 ML: at 13:21

## 2020-01-01 RX ADMIN — GABAPENTIN 300 MG: 300 CAPSULE ORAL at 16:43

## 2020-01-01 RX ADMIN — OXYCODONE 5 MG: 5 TABLET ORAL at 10:53

## 2020-01-01 RX ADMIN — ATORVASTATIN CALCIUM 10 MG: 10 TABLET, FILM COATED ORAL at 08:25

## 2020-01-01 RX ADMIN — ENOXAPARIN SODIUM 40 MG: 40 INJECTION SUBCUTANEOUS at 21:17

## 2020-01-01 RX ADMIN — COLCHICINE 0.6 MG: 0.6 TABLET, FILM COATED ORAL at 08:26

## 2020-01-01 RX ADMIN — Medication 10 ML: at 22:16

## 2020-01-01 RX ADMIN — ASPIRIN 81 MG CHEWABLE TABLET 81 MG: 81 TABLET CHEWABLE at 08:25

## 2020-01-01 RX ADMIN — TAMSULOSIN HYDROCHLORIDE 0.4 MG: 0.4 CAPSULE ORAL at 08:26

## 2020-01-01 RX ADMIN — OXYCODONE 5 MG: 5 TABLET ORAL at 03:48

## 2020-01-01 RX ADMIN — INSULIN LISPRO 5 UNITS: 100 INJECTION, SOLUTION INTRAVENOUS; SUBCUTANEOUS at 16:43

## 2020-01-01 NOTE — PROGRESS NOTES
Bedside and Verbal shift change report given to Mer Bishop (oncoming nurse) by Amy South RN (offgoing nurse). Report included the following information SBAR, Kardex, Procedure Summary, Intake/Output, MAR and Recent Results.

## 2020-01-01 NOTE — PROGRESS NOTES
Bedside shift change report given to Necia Essex RN by Xavi Adam. Report included the following information SBAR and ED Summary.

## 2020-01-01 NOTE — PROGRESS NOTES
Problem: Diabetes Self-Management  Goal: *Disease process and treatment process  Description  Define diabetes and identify own type of diabetes; list 3 options for treating diabetes. Outcome: Progressing Towards Goal  Goal: *Incorporating nutritional management into lifestyle  Description  Describe effect of type, amount and timing of food on blood glucose; list 3 methods for planning meals. Outcome: Progressing Towards Goal  Goal: *Incorporating physical activity into lifestyle  Description  State effect of exercise on blood glucose levels. Outcome: Progressing Towards Goal  Goal: *Developing strategies to promote health/change behavior  Description  Define the ABC's of diabetes; identify appropriate screenings, schedule and personal plan for screenings. Outcome: Progressing Towards Goal  Goal: *Using medications safely  Description  State effect of diabetes medications on diabetes; name diabetes medication taking, action and side effects. Outcome: Progressing Towards Goal  Goal: *Monitoring blood glucose, interpreting and using results  Description  Identify recommended blood glucose targets  and personal targets. Outcome: Progressing Towards Goal  Goal: *Prevention, detection, treatment of acute complications  Description  List symptoms of hyper- and hypoglycemia; describe how to treat low blood sugar and actions for lowering  high blood glucose level. Outcome: Progressing Towards Goal  Goal: *Prevention, detection and treatment of chronic complications  Description  Define the natural course of diabetes and describe the relationship of blood glucose levels to long term complications of diabetes.   Outcome: Progressing Towards Goal  Goal: *Developing strategies to address psychosocial issues  Description  Describe feelings about living with diabetes; identify support needed and support network  Outcome: Progressing Towards Goal  Goal: *Insulin pump training  Outcome: Progressing Towards Goal  Goal: *Sick day guidelines  Outcome: Progressing Towards Goal  Goal: *Patient Specific Goal (EDIT GOAL, INSERT TEXT)  Outcome: Progressing Towards Goal     Problem: Patient Education: Go to Patient Education Activity  Goal: Patient/Family Education  Outcome: Progressing Towards Goal     Problem: Falls - Risk of  Goal: *Absence of Falls  Description  Document Amanda Simmons Fall Risk and appropriate interventions in the flowsheet.   Outcome: Progressing Towards Goal  Note: Fall Risk Interventions:  Mobility Interventions: Communicate number of staff needed for ambulation/transfer         Medication Interventions: Teach patient to arise slowly, Patient to call before getting OOB    Elimination Interventions: Call light in reach              Problem: Patient Education: Go to Patient Education Activity  Goal: Patient/Family Education  Outcome: Progressing Towards Goal     Problem: Pain  Goal: *Control of Pain  Outcome: Progressing Towards Goal  Goal: *PALLIATIVE CARE:  Alleviation of Pain  Outcome: Progressing Towards Goal     Problem: Patient Education: Go to Patient Education Activity  Goal: Patient/Family Education  Outcome: Progressing Towards Goal

## 2020-01-01 NOTE — PROGRESS NOTES
Hospitalist Progress Note    NAME: Eneida Rowe   :  1960   MRN:  [id number]       Assessment / Plan:  Acute polyarthritis  -XR of R knee showed severe osteoarthritis  -s/p arthrocentesis, no organisms seen on Gram stain thus far.  -failed NSAIDs outpt  -cont' PO prednisone, colchicine   -allopurinol after resolution of acute flare  -will add IV morphine as needed  -ortho following    HTN  -BP elevated likely due to pain  -Continue losartan amlodipine  -hydralazine prn    T2DM  -A1C 6.9  -hold metfomin  -SSI    CKD 3  -cr stable    Obesity    Body mass index is 38.41 kg/m². Code status: Full  Prophylaxis: Lovenox  Recommended Disposition: Home w/Family     Subjective:     Chief Complaint / Reason for Physician Visit  Pt seen, complains of leg pain not much improved after medications wore off. \"Can have something IV for pain? \" . Discussed with RN events overnight. Review of Systems:  Symptom Y/N Comments  Symptom Y/N Comments   Fever/Chills    Chest Pain     Poor Appetite    Edema     Cough    Abdominal Pain     Sputum    Joint Pain y R knee and ankle   SOB/CASSIDY    Pruritis/Rash     Nausea/vomit    Tolerating PT/OT     Diarrhea    Tolerating Diet     Constipation    Other       Could NOT obtain due to:      Objective:     VITALS:   Last 24hrs VS reviewed since prior progress note. Most recent are:  Patient Vitals for the past 24 hrs:   Temp Pulse Resp BP SpO2   20 0738 98.5 °F (36.9 °C) 72 18 150/90 96 %   20 0327 97.8 °F (36.6 °C) 78 18 145/89 94 %   20 0007 97.7 °F (36.5 °C) 67 18 142/86 95 %   19 1555 98.3 °F (36.8 °C) 84 18 129/81 95 %   19 1258 98.7 °F (37.1 °C) 85 18 (!) 143/93 96 %   19 1222 98 °F (36.7 °C)           Intake/Output Summary (Last 24 hours) at 2020 1134  Last data filed at 2020 0738  Gross per 24 hour   Intake 1620 ml   Output 3401 ml   Net -1781 ml        PHYSICAL EXAM:  General: WD, WN.  Alert, cooperative, no acute distress    EENT:  EOMI. Anicteric sclerae. MMM  Resp:  CTA bilaterally, no wheezing or rales. No accessory muscle use  CV:  Regular  rhythm,  No edema  GI:  Soft, Non distended, Non tender.  +Bowel sounds  Neurologic:  Alert and oriented X 3, normal speech,   Psych:   Good insight. Not anxious nor agitated  Skin:  No rashes. No jaundice    Reviewed most current lab test results and cultures  YES  Reviewed most current radiology test results   YES  Review and summation of old records today    NO  Reviewed patient's current orders and MAR    YES  PMH/SH reviewed - no change compared to H&P  ________________________________________________________________________  Care Plan discussed with:    Comments   Patient x    Family      RN x    Care Manager     Consultant                        Multidiciplinary team rounds were held today with , nursing, pharmacist and clinical coordinator. Patient's plan of care was discussed; medications were reviewed and discharge planning was addressed. ________________________________________________________________________  Total NON critical care TIME:  35   Minutes    Total CRITICAL CARE TIME Spent:   Minutes non procedure based      Comments   >50% of visit spent in counseling and coordination of care     ________________________________________________________________________  Steffany Escobar MD     Procedures: see electronic medical records for all procedures/Xrays and details which were not copied into this note but were reviewed prior to creation of Plan. LABS:  I reviewed today's most current labs and imaging studies.   Pertinent labs include:  Recent Labs     01/01/20  0355 12/31/19  0413 12/30/19  1715   WBC 14.3* 10.4 11.4*   HGB 13.2 13.0 14.0   HCT 40.7 40.6 43.7    289 285     Recent Labs     01/01/20  0355 12/31/19  0413 12/30/19  1715    138 137   K 4.0 4.2 4.4    103 102   CO2 30 29 30   * 202* 136*   BUN 18 14 13   CREA 1.25 1.31* 1.25   CA 9.8 9.6 10.5*   ALB  --   --  3.8   TBILI  --   --  0.6   SGOT  --   --  28   ALT  --   --  66       Signed: Kylie Murguia MD

## 2020-01-01 NOTE — PROGRESS NOTES
Bedside and Verbal shift change report given to Petra Ortiz (oncoming nurse) by Ortiz Romeo (offgoing nurse). Report included the following information SBAR, Kardex, Procedure Summary, Intake/Output, MAR, Accordion and Recent Results.

## 2020-01-02 LAB
ANION GAP SERPL CALC-SCNC: 4 MMOL/L (ref 5–15)
BUN SERPL-MCNC: 18 MG/DL (ref 6–20)
BUN/CREAT SERPL: 14 (ref 12–20)
CALCIUM SERPL-MCNC: 9.1 MG/DL (ref 8.5–10.1)
CHLORIDE SERPL-SCNC: 103 MMOL/L (ref 97–108)
CO2 SERPL-SCNC: 32 MMOL/L (ref 21–32)
CREAT SERPL-MCNC: 1.27 MG/DL (ref 0.7–1.3)
ERYTHROCYTE [DISTWIDTH] IN BLOOD BY AUTOMATED COUNT: 13 % (ref 11.5–14.5)
GLUCOSE BLD STRIP.AUTO-MCNC: 136 MG/DL (ref 65–100)
GLUCOSE BLD STRIP.AUTO-MCNC: 287 MG/DL (ref 65–100)
GLUCOSE BLD STRIP.AUTO-MCNC: 312 MG/DL (ref 65–100)
GLUCOSE BLD STRIP.AUTO-MCNC: 417 MG/DL (ref 65–100)
GLUCOSE SERPL-MCNC: 163 MG/DL (ref 65–100)
HCT VFR BLD AUTO: 41.1 % (ref 36.6–50.3)
HGB BLD-MCNC: 13.1 G/DL (ref 12.1–17)
MCH RBC QN AUTO: 28.7 PG (ref 26–34)
MCHC RBC AUTO-ENTMCNC: 31.9 G/DL (ref 30–36.5)
MCV RBC AUTO: 89.9 FL (ref 80–99)
NRBC # BLD: 0 K/UL (ref 0–0.01)
NRBC BLD-RTO: 0 PER 100 WBC
PLATELET # BLD AUTO: 301 K/UL (ref 150–400)
PMV BLD AUTO: 9.5 FL (ref 8.9–12.9)
POTASSIUM SERPL-SCNC: 3.8 MMOL/L (ref 3.5–5.1)
RBC # BLD AUTO: 4.57 M/UL (ref 4.1–5.7)
SERVICE CMNT-IMP: ABNORMAL
SODIUM SERPL-SCNC: 139 MMOL/L (ref 136–145)
WBC # BLD AUTO: 13 K/UL (ref 4.1–11.1)

## 2020-01-02 PROCEDURE — 74011636637 HC RX REV CODE- 636/637: Performed by: INTERNAL MEDICINE

## 2020-01-02 PROCEDURE — 99218 HC RM OBSERVATION: CPT

## 2020-01-02 PROCEDURE — 74011250637 HC RX REV CODE- 250/637: Performed by: HOSPITALIST

## 2020-01-02 PROCEDURE — 97161 PT EVAL LOW COMPLEX 20 MIN: CPT | Performed by: PHYSICAL THERAPIST

## 2020-01-02 PROCEDURE — 77030032497 HC WRP SHLDR WO BGS SOLM -B

## 2020-01-02 PROCEDURE — 74011250636 HC RX REV CODE- 250/636: Performed by: INTERNAL MEDICINE

## 2020-01-02 PROCEDURE — 85027 COMPLETE CBC AUTOMATED: CPT

## 2020-01-02 PROCEDURE — 74011250637 HC RX REV CODE- 250/637: Performed by: INTERNAL MEDICINE

## 2020-01-02 PROCEDURE — 80048 BASIC METABOLIC PNL TOTAL CA: CPT

## 2020-01-02 PROCEDURE — 36415 COLL VENOUS BLD VENIPUNCTURE: CPT

## 2020-01-02 PROCEDURE — 97530 THERAPEUTIC ACTIVITIES: CPT | Performed by: PHYSICAL THERAPIST

## 2020-01-02 PROCEDURE — 82962 GLUCOSE BLOOD TEST: CPT

## 2020-01-02 RX ORDER — INSULIN LISPRO 100 [IU]/ML
12 INJECTION, SOLUTION INTRAVENOUS; SUBCUTANEOUS ONCE
Status: DISPENSED | OUTPATIENT
Start: 2020-01-03 | End: 2020-01-03

## 2020-01-02 RX ADMIN — ASPIRIN 81 MG CHEWABLE TABLET 81 MG: 81 TABLET CHEWABLE at 09:00

## 2020-01-02 RX ADMIN — TAMSULOSIN HYDROCHLORIDE 0.4 MG: 0.4 CAPSULE ORAL at 09:00

## 2020-01-02 RX ADMIN — Medication 10 ML: at 14:00

## 2020-01-02 RX ADMIN — PREDNISONE 40 MG: 20 TABLET ORAL at 07:02

## 2020-01-02 RX ADMIN — TRAMADOL HYDROCHLORIDE 50 MG: 50 TABLET, FILM COATED ORAL at 08:59

## 2020-01-02 RX ADMIN — OXYCODONE 5 MG: 5 TABLET ORAL at 12:57

## 2020-01-02 RX ADMIN — ATORVASTATIN CALCIUM 10 MG: 10 TABLET, FILM COATED ORAL at 09:00

## 2020-01-02 RX ADMIN — INSULIN LISPRO 5 UNITS: 100 INJECTION, SOLUTION INTRAVENOUS; SUBCUTANEOUS at 11:46

## 2020-01-02 RX ADMIN — METHYLPREDNISOLONE SODIUM SUCCINATE 80 MG: 125 INJECTION, POWDER, FOR SOLUTION INTRAMUSCULAR; INTRAVENOUS at 21:02

## 2020-01-02 RX ADMIN — METHYLPREDNISOLONE SODIUM SUCCINATE 80 MG: 125 INJECTION, POWDER, FOR SOLUTION INTRAMUSCULAR; INTRAVENOUS at 12:56

## 2020-01-02 RX ADMIN — COLCHICINE 0.6 MG: 0.6 TABLET, FILM COATED ORAL at 09:04

## 2020-01-02 RX ADMIN — GABAPENTIN 300 MG: 300 CAPSULE ORAL at 08:59

## 2020-01-02 RX ADMIN — AMLODIPINE BESYLATE 10 MG: 5 TABLET ORAL at 09:00

## 2020-01-02 RX ADMIN — LOSARTAN POTASSIUM 50 MG: 50 TABLET, FILM COATED ORAL at 08:59

## 2020-01-02 RX ADMIN — MORPHINE SULFATE 1 MG: 2 INJECTION, SOLUTION INTRAMUSCULAR; INTRAVENOUS at 21:03

## 2020-01-02 RX ADMIN — GABAPENTIN 300 MG: 300 CAPSULE ORAL at 15:05

## 2020-01-02 RX ADMIN — Medication 10 ML: at 23:29

## 2020-01-02 RX ADMIN — Medication 10 ML: at 06:16

## 2020-01-02 RX ADMIN — TRAZODONE HYDROCHLORIDE 50 MG: 50 TABLET ORAL at 23:28

## 2020-01-02 RX ADMIN — TRAMADOL HYDROCHLORIDE 50 MG: 50 TABLET, FILM COATED ORAL at 02:44

## 2020-01-02 RX ADMIN — INSULIN LISPRO 12 UNITS: 100 INJECTION, SOLUTION INTRAVENOUS; SUBCUTANEOUS at 23:28

## 2020-01-02 RX ADMIN — MORPHINE SULFATE 1 MG: 2 INJECTION, SOLUTION INTRAMUSCULAR; INTRAVENOUS at 06:20

## 2020-01-02 RX ADMIN — Medication 10 ML: at 21:05

## 2020-01-02 RX ADMIN — ENOXAPARIN SODIUM 40 MG: 40 INJECTION SUBCUTANEOUS at 21:02

## 2020-01-02 RX ADMIN — GABAPENTIN 300 MG: 300 CAPSULE ORAL at 23:28

## 2020-01-02 RX ADMIN — OXYCODONE 5 MG: 5 TABLET ORAL at 07:02

## 2020-01-02 RX ADMIN — INSULIN LISPRO 7 UNITS: 100 INJECTION, SOLUTION INTRAVENOUS; SUBCUTANEOUS at 16:56

## 2020-01-02 RX ADMIN — DOCUSATE SODIUM 100 MG: 100 CAPSULE, LIQUID FILLED ORAL at 09:00

## 2020-01-02 NOTE — PROGRESS NOTES
Bedside and Verbal shift change report given to Zuleyma Gomez (oncoming nurse) by [employer] (offgoing nurse). Report included the following information SBAR, Kardex, Procedure Summary, Intake/Output, MAR and Accordion.

## 2020-01-02 NOTE — PROGRESS NOTES
Orthopedic NP Progress Note  Post Op day: * No surgery found *    January 2, 2020 10:46 AM     Nikojb Cunninghamner    Attending Physician: Treatment Team: Attending Provider: Lc Sabillon DO; Consulting Provider: Berta Vo MD; Consulting Provider: Ernestine Sullivan DO; Utilization Review: Viktor Wiley RN; Primary Nurse: Keith Masterson RN     Vital Signs:    Patient Vitals for the past 8 hrs:   BP Temp Pulse Resp SpO2   01/02/20 0811 152/81 97.9 °F (36.6 °C) 88 16 97 %     BMI (calculated): 38.4 (12/30/19 1429)    Intake/Output:  01/02 0701 - 01/02 1900  In: 360 [P.O.:360]  Out: 200 [Urine:200]  12/31 1901 - 01/02 0700  In: 900 [P.O.:900]  Out: 4515 [Urine:5025]    Pain Control:   Pain Assessment  Pain Scale 1: Numeric (0 - 10)  Pain Intensity 1: 7  Pain Onset 1: acute  Pain Location 1: Arm, Elbow, Ankle  Pain Orientation 1: Right  Pain Description 1: Aching, Constant  Pain Intervention(s) 1: Medication (see MAR)    LAB:    Recent Labs     01/02/20  0231   HCT 41.1   HGB 13.1     Lab Results   Component Value Date/Time    Sodium 139 01/02/2020 02:31 AM    Potassium 3.8 01/02/2020 02:31 AM    Chloride 103 01/02/2020 02:31 AM    CO2 32 01/02/2020 02:31 AM    Glucose 163 (H) 01/02/2020 02:31 AM    BUN 18 01/02/2020 02:31 AM    Creatinine 1.27 01/02/2020 02:31 AM    Calcium 9.1 01/02/2020 02:31 AM       Subjective:  Kaylene Sabillon is a 61 y.o. male R knee pain rule out septic jt, pain improved  . Tolerating diet. Objective: General: alert, cooperative, no distress. Neuro/Vascular: CNS Intact. Sensation stable. Brisk cap refill, 2+ pulses UE/LE  Musculoskeletal:  +ROM UE/LE with limited ROM of R knee - min effusion, no erythema and not warm to touch. Skin: Incision - clean, dry and intact. No significant erythema or swelling.     Dressing: clean, dry, and intact    Devlin - n  Drain - n       PT/OT:   Gait:                      Assessment:    s/p     Active Problems:    Acute infective polyarthritis ([facility] [address] (12/30/2019)      Gout attack (12/31/2019)         Plan:   -  Continue PT/OT - WBAT - needs encouragement   -  Continue established methods of pain control  -  Culture pending from 12/30 - no organisms no growth thus far      Signed By: Baldemar Graham NP    Orthopedic Nurse Practitioner

## 2020-01-02 NOTE — CONSULTS
Rheumatology Consult    Subjective:     Jess Andrade is a 61 y.o. Male w hx of gout admitted for polyarticular inflammatory arthritis. Sx started with rt knee pain about 3 months ago and then progressed to involve rt ankle, foot and right MCPs, pips and rt wrist and swelling of these joints. He started indomethacin several weeks ago which did not help. He has been on colchicine 1 tab daily for years to control his gout because he had allergic reactions to allopurinol and uloric. He tried increasing his colchicine to several times a day when indomethacin was not helping, but this caused nausea and he did not see any improvement in his joint pain   He had a rt knee arthrocentesis and a minimal amt of fluid was removed. The culture has not grown any organisms to date and fluid was negative for crystals. Cell ct not performed. He has been on pred 40mg daily which has helped some, but he still has significant joint pain and swelling. He had colitis in the past, not sure if it was UC or infectious colitis and has not been told to follow with GI regularly. He had a C scope since then and reports it was nml. No hx of psoriasis and no FH of RA or lupus. He denies oronasal ulcers, alopecia, rashes/photosensitivity, uveitis sx, raynauds, hx of serositis.     Past Medical History:   Diagnosis Date    Arthritis     Chronic pain     Diabetes ([address]     GERD (gastroesophageal reflux disease)     Gout     Hypertension     Other ill-defined conditions(799.89)     gout      Past Surgical History:   Procedure Laterality Date    HX APPENDECTOMY      HX ORTHOPAEDIC      RIGHT knee scope     Family History   Problem Relation Age of Onset    Hypertension Mother     Diabetes Mother     Heart Disease Father       Social History     Tobacco Use    Smoking status: Never Smoker    Smokeless tobacco: Never Used   Substance Use Topics    Alcohol use: Yes     Comment: occassionally \"weekends\"       Current Facility-Administered Medications   Medication Dose Route Frequency    methylPREDNISolone (PF) (Solu-MEDROL) injection 80 mg  80 mg IntraVENous Q12H    morphine injection 1 mg  1 mg IntraVENous Q4H PRN    traZODone (DESYREL) tablet 50 mg  50 mg Oral QHS PRN    hydrALAZINE (APRESOLINE) 20 mg/mL injection 10 mg  10 mg IntraVENous Q6H PRN    amLODIPine (NORVASC) tablet 10 mg  10 mg Oral DAILY    atorvastatin (LIPITOR) tablet 10 mg  10 mg Oral DAILY    gabapentin (NEURONTIN) capsule 300 mg  300 mg Oral TID    oxyCODONE IR (ROXICODONE) tablet 5 mg  5 mg Oral Q6H PRN    traMADol (ULTRAM) tablet 50 mg  50 mg Oral Q6H PRN    aspirin chewable tablet 81 mg  81 mg Oral DAILY    colchicine tablet 0.6 mg  0.6 mg Oral DAILY    tamsulosin (FLOMAX) capsule 0.4 mg  0.4 mg Oral DAILY    sodium chloride (NS) flush 5-40 mL  5-40 mL IntraVENous Q8H    sodium chloride (NS) flush 5-40 mL  5-40 mL IntraVENous PRN    acetaminophen (TYLENOL) solution 650 mg  650 mg Oral Q4H PRN    docusate sodium (COLACE) capsule 100 mg  100 mg Oral BID    enoxaparin (LOVENOX) injection 40 mg  40 mg SubCUTAneous Q24H    insulin lispro (HUMALOG) injection   SubCUTAneous AC&HS    glucose chewable tablet 16 g  4 Tab Oral PRN    glucagon (GLUCAGEN) injection 1 mg  1 mg IntraMUSCular PRN    losartan (COZAAR) tablet 50 mg  50 mg Oral DAILY      Allergies   Allergen Reactions    Uloric [Febuxostat] Anaphylaxis    Allopurinol Hives        Review of Systems:  gen - malaise due to pain  HEENT- no oral or nasal ulcers, no photophobia  Neck - no pain  cv - no chest pain  pulm - no SOB  GI - no abdominal pain other than after he increased his colchicine, diarrhea  Skin - no rashes, photosensitivity   msk - as noted above  Neuro - rt leg weakness he attributes to pain  Psych- no anxiety, hallucinations    Objective:       T 98.2 HR 76 /90  sats 96% RA    Physical Exam:   gen -alert, nad  HEENT - no oral ulcers, conjunctiva anicteric/white  Neck/heme - no cervical lymphadenopathy  Pulm - cta b/l  Cards - rrr no mrg  abd - soft, nontender  Skin - no rashes  msk - synovitis rt ankle, MTPs, rt wrist, 2nd pip. Rt MCPs are ttp. Rt knee ttp with small effusion  Neuro - did not tolerate strength testing on rt side d/t pain, lt UE and LE 5/5  Psych - appropriate mood and affect  Data Review:   Recent Results (from the past 12 hour(s))   CBC W/O DIFF    Collection Time: 01/02/20  2:31 AM   Result Value Ref Range    WBC 13.0 (H) 4.1 - 11.1 K/uL    RBC 4.57 4.10 - 5.70 M/uL    HGB 13.1 12.1 - 17.0 g/dL    HCT 41.1 36.6 - 50.3 %    MCV 89.9 80.0 - 99.0 FL    MCH 28.7 26.0 - 34.0 PG    MCHC 31.9 30.0 - 36.5 g/dL    RDW 13.0 11.5 - 14.5 %    PLATELET 984 937 - 200 K/uL    MPV 9.5 8.9 - 12.9 FL    NRBC 0.0 0  WBC    ABSOLUTE NRBC 0.00 0.00 - 2.84 K/uL   METABOLIC PANEL, BASIC    Collection Time: 01/02/20  2:31 AM   Result Value Ref Range    Sodium 139 136 - 145 mmol/L    Potassium 3.8 3.5 - 5.1 mmol/L    Chloride 103 97 - 108 mmol/L    CO2 32 21 - 32 mmol/L    Anion gap 4 (L) 5 - 15 mmol/L    Glucose 163 (H) 65 - 100 mg/dL    BUN 18 6 - 20 MG/DL    Creatinine 1.27 0.70 - 1.30 MG/DL    BUN/Creatinine ratio 14 12 - 20      GFR est AA >60 >60 ml/min/1.73m2    GFR est non-AA 58 (L) >60 ml/min/1.73m2    Calcium 9.1 8.5 - 10.1 MG/DL   GLUCOSE, POC    Collection Time: 01/02/20  7:19 AM   Result Value Ref Range    Glucose (POC) 136 (H) 65 - 100 mg/dL    Performed by Hailey White ([facility])    GLUCOSE, POC    Collection Time: 01/02/20 11:18 AM   Result Value Ref Range    Glucose (POC) 287 (H) 65 - 100 mg/dL    Performed by Hailey White (PCT)        Rt knee x ray report - severe OA    Assessment:     Active Problems:    Acute infective polyarthritis ([address] (12/30/2019)      Gout attack (12/31/2019)    61 yo male with hx of gout admitted for subacute polyarticular inflammatory arthritis.  Rt knee fluid negative for crystals and this occurred while he was taking daily colchicine so I doubt this is gout. 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 He has improved a little on pred 40mg daily    Plan:     Polyarticular inflammatory arthritis - likely RA vs ank spond. Change steroids to solumedrol 80mg IV bid until sx improve, would then change to prednisone 40mg daily and I will see him as an outpatient  - checking rf, ccp, hla b27, REEMA IF to start and checking lyme titer since he has not improved that much on high dose oral pred      Hx of gout - doubt this is a gout flare since he sx developed while taking daily colchicine and fluid was negative for crystals. He had allergic reactions to allopurinol and uloric, so can cont colchicine prophylaxis for now.  Could decrease to m/w/f as an outpatient    Signed By: Serg Goode MD     January 2, 2020

## 2020-01-02 NOTE — PROGRESS NOTES
PAUL[place]: HH vs rehab    Reason for Admission: acute infective polyarthritis, gout attack                      RRAT Score:  8                   Plan for utilizing home health: pending patient progress                          Current Advanced Directive/Advance Care Plan: not at this time                          Transition of Care Plan:                      Patient is a 62 y/o male who was admitted to [facility] for acute infective polyarthritis and gout attack. CM made room visit with patient who was alert and oriented with no visitors present at bedside. Patient confirmed demographics and emergency contact on file. Patient reported that his health insurance changed 1/1/20. New card provided to CM. CM has made copy of new card and sent to inpatient financial counselor. Patient's PCP is Dr. Giovana Saleem and was last seen about 2 weeks ago. Per patient since his insurance has changed he will need to change his PCP and requested a PCP list. Patient uses [address] Patient, girlfriend, and two children (24 y/o and 4 y/o) reside in a two story home, master bedroom on second floor, with one entry step. Patient reported that his only DME at home are crutches. Patient reported that for last 2 months he has been on the couch with limited ability to get up and needing assistance with bathing, dressing, cutting food up, and all other ALDs. Per patient his girlfriend works during the day which leaves patient home alone during that time. Per patient he would lay on the couch unable to get up to get food or take medications during that time frame while girlfriend was at work. Patient raised concern regarding his inability to eat/take medication as he has DM. Patient reported that before October he was more independent with walking but still had a limp. Patient denied any history of HH, SNF, or IPR.      Patient has requested that he is discharged to a rehab facility as he reports he cannot care for himself at home in the state he is currently in. CM requested patient to remain positive and work with therapy in hospital before jumping to rehab. Oral and Written notification given to patient and/or caregiver informing them that they are currently an Outpatient receiving care in our facility. Outpatient services include Observation Services. Care Management Interventions  PCP Verified by CM: Yes  Mode of Transport at Discharge:  Other (see comment)  Transition of Care Consult (CM Consult): Discharge Planning  Physical Therapy Consult: Yes  Occupational Therapy Consult: Yes  Speech Therapy Consult: No  Current Support Network: Lives with Spouse, Own Home  Confirm Follow Up Transport: [address] [place] [address], [facility] [address]

## 2020-01-02 NOTE — PROGRESS NOTES
Pt progressing towards goals. VS stable; free from falls; Pain managed w/ pain meds; Verbalized understanding for lifestyle management for DM.

## 2020-01-02 NOTE — PROGRESS NOTES
Bedside shift change report given to Efren guerra (oncoming nurse) by Warden Porter (offgoing nurse). Report included the following information SBAR, Kardex, Intake/Output, MAR and Recent Results.

## 2020-01-02 NOTE — PROGRESS NOTES
Problem: Mobility Impaired (Adult and Pediatric)  Goal: *Acute Goals and Plan of Care (Insert Text)  Description  FUNCTIONAL STATUS PRIOR TO ADMISSION: Patient was independent and active without use of DME. Has been bed bound for the past 3 weeks secondary to R knee pain, R ankle and R elbow. Was previously able to ambulate with crutches when pain in knee first began. HOME SUPPORT PRIOR TO ADMISSION: The patient lived with wife but did not require assist prior to issue. Family assisted him after he was unable to tolerate walking but they work during the day. Physical Therapy Goals  Initiated 1/2/2020  1. Patient will move from supine to sit and sit to supine  in bed with modified independence within 7 day(s). 2.  Patient will transfer from bed to chair and chair to bed with modified independence using the least restrictive device within 7 day(s). 3.  Patient will perform sit to stand with modified independence within 7 day(s). 4.  Patient will ambulate with modified independence for 20 feet with the least restrictive device within 7 day(s). Outcome: Progressing Towards Goal   PHYSICAL THERAPY EVALUATION  Patient: Carmenza Hussein (54 y.o. male)  Date: 1/2/2020  Primary Diagnosis: Acute infective polyarthritis ([facility] [address] [M00.9]  Gout attack [M10.9]        Precautions:   Fall      ASSESSMENT  Based on the objective data described below, the patient presents with decreased functional mobility from baseline level of function. Patient currently limited by severe R knee pain as well as R elbow and R ankle pain. Otherwise has good strength and can come to EOB with supervision. Overall improved mobility utilizing platform walker and is able to come to the bedside chair with Conchita x 2. Puts minimal weight through his R LE. Anticipate as pain improves his mobility will improve.     Current Level of Function Impacting Discharge (mobility/balance): Conchita for transfers and to take approx 3 steps to bedside chair with platform walker       Other factors to consider for discharge: at risk for falls given his pain and inability to stand, pain limits progress. Has no assistance during the day     Patient will benefit from skilled therapy intervention to address the above noted impairments. PLAN :  Recommendations and Planned Interventions: bed mobility training, transfer training, gait training, therapeutic exercises, patient and family training/education, and therapeutic activities      Frequency/Duration: Patient will be followed by physical therapy:  5 times a week to address goals. Recommendation for discharge: (in order for the patient to meet his/her long term goals)  Therapy 3 hours per day 5-7 days per week IF pain improves and he is able to ambulate may be okay for [facility] PT        IF patient discharges home will need the following DME: rolling walker, bedside commode         SUBJECTIVE:   Patient stated My pain was so bad that I couldn't stand or walk. I've been on the couch for a month.     OBJECTIVE DATA SUMMARY:   HISTORY:    Past Medical History:   Diagnosis Date    Arthritis     Chronic pain     Diabetes (HCC)     GERD (gastroesophageal reflux disease)     Gout     Hypertension     Other ill-defined conditions(799.89)     gout     Past Surgical History:   Procedure Laterality Date    HX APPENDECTOMY      HX ORTHOPAEDIC      RIGHT knee scope       Personal factors and/or comorbidities impacting plan of care:     Home Situation  Home Environment: Private residence  # Steps to Enter: 1  One/Two Story Residence: Two story  # of Interior Steps: 13  Height of Each Step (in): 8 inches  Interior Rails: Right  Lift Chair Available: No  Living Alone: No  Support Systems: Child(lakshmi), [employer], Family member(s), Friends \ neighbors  Patient Expects to be Discharged to[de-identified] Private residence  Current DME Used/Available at Home: Glucometer    EXAMINATION/PRESENTATION/DECISION MAKING:   Critical Behavior:   Alert and oriented x 4           Hearing: Auditory  Auditory Impairment: None    Range Of Motion:  AROM: Generally decreased, functional                       Strength:    Strength: Generally decreased, functional        Functional Mobility:  Bed Mobility:  Rolling: Supervision; Additional time  Supine to Sit: Supervision; Additional time     Scooting: Supervision; Additional time  Transfers:  Sit to Stand: Minimum assistance; Additional time;Assist x2  Stand to Sit: Minimum assistance;Assist x2                       Balance:   Sitting: Intact  Standing: Impaired  Standing - Static: Constant support; Fair  Ambulation/Gait Training:  Distance (ft): 4 Feet (ft)(steps from bed to chair)  Assistive Device: Gait belt;Walker, rolling(with R side platform)  Ambulation - Level of Assistance: Minimal assistance;Assist x2     Gait Description (WDL): Exceptions to WDL  Gait Abnormalities: Antalgic;Decreased step clearance; Step to gait        Base of Support: Center of gravity altered;Narrowed  Stance: Right decreased  Speed/Patricia: Pace decreased (<100 feet/min); Slow  Step Length: Left shortened;Right shortened         Pain Rating:  Reports pain with weightbearing but does not rate    Activity Tolerance:   Good and requires frequent rest breaks  Please refer to the flowsheet for vital signs taken during this treatment. After treatment patient left in no apparent distress:   Sitting in chair and Call bell within reach    COMMUNICATION/EDUCATION:   The patients plan of care was discussed with: Physical Therapist, Occupational Therapist, and Registered Nurse. Fall prevention education was provided and the patient/caregiver indicated understanding., Patient/family have participated as able in goal setting and plan of care. , and Patient/family agree to work toward stated goals and plan of care.     Thank you for this referral.  Felicia Tidwell, PT, DPT   Time Calculation: 20 mins

## 2020-01-02 NOTE — PROGRESS NOTES
Hospitalist Progress Note    NAME: Jess Andrade   :  1960   MRN:  [id number]       Assessment / Plan:  Acute polyarthritis  -XR of R knee showed severe osteoarthritis  -uric acid 7.5 12.30, CRP > 10  -hasn't been able to tolerate allopurinol or other urate lower meds so far  -will ask rheumatology consult  -s/p arthrocentesis, no organisms seen on Gram stain thus far.  -failed NSAIDs outpt  -cont' PO prednisone, colchicine   -IV morphine added yesterday, helping some  -ortho following  -PT/OT    CKD 3  -Cr appears at baseline  -urate nephropathy or DM nephropathy may be contributing     HTN  -BP elevated likely due to pain  -Continue losartan amlodipine  -hydralazine prn     T2DM  -A1C 6.9  -hold metfomin  -SSI     Obesity    Body mass index is 38.41 kg/m².     Code status: Full  Prophylaxis: Lovenox  Recommended Disposition: Home w/Family     Subjective:     Chief Complaint / Reason for Physician Visit  C/o disabling Rt wrist pain, difficulty feeding himself etc. Ongoing Rt knee pain persists    Discussed with RN events overnight. Review of Systems:  Symptom Y/N Comments  Symptom Y/N Comments   Fever/Chills n   Chest Pain n    Poor Appetite    Edema     Cough n   Abdominal Pain n    Sputum    Joint Pain y Rt wrist & kneee   SOB/CASSIDY n   Pruritis/Rash     Nausea/vomit n   Tolerating PT/OT     Diarrhea n   Tolerating Diet     Constipation n   Other       Could NOT obtain due to:      Objective:     VITALS:   Last 24hrs VS reviewed since prior progress note.  Most recent are:  Patient Vitals for the past 24 hrs:   Temp Pulse Resp BP SpO2   20 0811 97.9 °F (36.6 °C) 88 16 152/81 97 %   20 0226 98.2 °F (36.8 °C) 63 16 135/83 96 %   20 0010 98.2 °F (36.8 °C) 68 16 145/86 95 %   20 1921 98.6 °F (37 °C) 82 16 145/88 96 %   20 1630 98.1 °F (36.7 °C) 75 16 146/84 93 %   20 1143 98.3 °F (36.8 °C) 77 18 157/81 96 %       Intake/Output Summary (Last 24 hours) at 2020 [address] filed at 1/2/2020 0904  Gross per 24 hour   Intake 360 ml   Output 2625 ml   Net -2265 ml        PHYSICAL EXAM:  General: WD, WN. Alert, cooperative, no acute distress    EENT:  EOMI. Anicteric sclerae. MMM  Resp:  CTA bilaterally, no wheezing or rales. No accessory muscle use  CV:  Regular  rhythm,  No edema  GI:  Soft, Non distended, Non tender.  +Bowel sounds  Neurologic:  Alert and oriented X 3, normal speech,   Psych:   Good insight. Not anxious nor agitated  Skin:  No rashes. No jaundice    Reviewed most current lab test results and cultures  YES  Reviewed most current radiology test results   YES  Review and summation of old records today    NO  Reviewed patient's current orders and MAR    YES  PMH/SH reviewed - no change compared to H&P  ________________________________________________________________________  Care Plan discussed with:    Comments   Patient x    Family      RN x    Care Manager     Consultant                        Multidiciplinary team rounds were held today with , nursing, pharmacist and clinical coordinator. Patient's plan of care was discussed; medications were reviewed and discharge planning was addressed. ________________________________________________________________________  Total NON critical care TIME:  25   Minutes    Total CRITICAL CARE TIME Spent:   Minutes non procedure based      Comments   >50% of visit spent in counseling and coordination of care x    ________________________________________________________________________  Raynell Party, DO     Procedures: see electronic medical records for all procedures/Xrays and details which were not copied into this note but were reviewed prior to creation of Plan. LABS:  I reviewed today's most current labs and imaging studies.   Pertinent labs include:  Recent Labs     01/02/20  0231 01/01/20  0355 12/31/19  0413   WBC 13.0* 14.3* 10.4   HGB 13.1 13.2 13.0   HCT 41.1 40.7 40.6   PLT 301 298 289     Recent Labs     01/02/20  0231 01/01/20  0355 12/31/19  0413 12/30/19  1715    141 138 137   K 3.8 4.0 4.2 4.4    106 103 102   CO2 32 30 29 30   * 177* 202* 136*   BUN 18 18 14 13   CREA 1.27 1.25 1.31* 1.25   CA 9.1 9.8 9.6 10.5*   ALB  --   --   --  3.8   TBILI  --   --   --  0.6   SGOT  --   --   --  28   ALT  --   --   --  77       Signed: Claudy Abdi, DO

## 2020-01-03 LAB
GLUCOSE BLD STRIP.AUTO-MCNC: 291 MG/DL (ref 65–100)
GLUCOSE BLD STRIP.AUTO-MCNC: 362 MG/DL (ref 65–100)
GLUCOSE BLD STRIP.AUTO-MCNC: 382 MG/DL (ref 65–100)
GLUCOSE BLD STRIP.AUTO-MCNC: 392 MG/DL (ref 65–100)
RHEUMATOID FACT SERPL-ACNC: <10 IU/ML
SERVICE CMNT-IMP: ABNORMAL

## 2020-01-03 PROCEDURE — 74011250636 HC RX REV CODE- 250/636: Performed by: INTERNAL MEDICINE

## 2020-01-03 PROCEDURE — 86431 RHEUMATOID FACTOR QUANT: CPT

## 2020-01-03 PROCEDURE — 74011636637 HC RX REV CODE- 636/637: Performed by: INTERNAL MEDICINE

## 2020-01-03 PROCEDURE — 86618 LYME DISEASE ANTIBODY: CPT

## 2020-01-03 PROCEDURE — 36415 COLL VENOUS BLD VENIPUNCTURE: CPT

## 2020-01-03 PROCEDURE — 74011250637 HC RX REV CODE- 250/637: Performed by: INTERNAL MEDICINE

## 2020-01-03 PROCEDURE — 94760 N-INVAS EAR/PLS OXIMETRY 1: CPT

## 2020-01-03 PROCEDURE — 74011250637 HC RX REV CODE- 250/637: Performed by: HOSPITALIST

## 2020-01-03 PROCEDURE — 81374 HLA I TYPING 1 ANTIGEN LR: CPT

## 2020-01-03 PROCEDURE — 99218 HC RM OBSERVATION: CPT

## 2020-01-03 PROCEDURE — 97116 GAIT TRAINING THERAPY: CPT

## 2020-01-03 PROCEDURE — 82962 GLUCOSE BLOOD TEST: CPT

## 2020-01-03 PROCEDURE — 97535 SELF CARE MNGMENT TRAINING: CPT | Performed by: OCCUPATIONAL THERAPIST

## 2020-01-03 PROCEDURE — 86200 CCP ANTIBODY: CPT

## 2020-01-03 PROCEDURE — 97165 OT EVAL LOW COMPLEX 30 MIN: CPT | Performed by: OCCUPATIONAL THERAPIST

## 2020-01-03 PROCEDURE — 97530 THERAPEUTIC ACTIVITIES: CPT | Performed by: OCCUPATIONAL THERAPIST

## 2020-01-03 RX ORDER — INSULIN LISPRO 100 [IU]/ML
10 INJECTION, SOLUTION INTRAVENOUS; SUBCUTANEOUS ONCE
Status: COMPLETED | OUTPATIENT
Start: 2020-01-03 | End: 2020-01-03

## 2020-01-03 RX ORDER — INSULIN LISPRO 100 [IU]/ML
12 INJECTION, SOLUTION INTRAVENOUS; SUBCUTANEOUS ONCE
Status: COMPLETED | OUTPATIENT
Start: 2020-01-03 | End: 2020-01-03

## 2020-01-03 RX ORDER — NIFEDIPINE 90 MG/1
90 TABLET, FILM COATED, EXTENDED RELEASE ORAL DAILY
Status: DISCONTINUED | OUTPATIENT
Start: 2020-01-04 | End: 2020-01-06

## 2020-01-03 RX ORDER — INSULIN LISPRO 100 [IU]/ML
14 INJECTION, SOLUTION INTRAVENOUS; SUBCUTANEOUS ONCE
Status: DISCONTINUED | OUTPATIENT
Start: 2020-01-03 | End: 2020-01-03

## 2020-01-03 RX ORDER — INSULIN LISPRO 100 [IU]/ML
16 INJECTION, SOLUTION INTRAVENOUS; SUBCUTANEOUS ONCE
Status: COMPLETED | OUTPATIENT
Start: 2020-01-03 | End: 2020-01-03

## 2020-01-03 RX ADMIN — AMLODIPINE BESYLATE 10 MG: 5 TABLET ORAL at 08:19

## 2020-01-03 RX ADMIN — MORPHINE SULFATE 1 MG: 2 INJECTION, SOLUTION INTRAMUSCULAR; INTRAVENOUS at 21:18

## 2020-01-03 RX ADMIN — INSULIN LISPRO 16 UNITS: 100 INJECTION, SOLUTION INTRAVENOUS; SUBCUTANEOUS at 22:10

## 2020-01-03 RX ADMIN — LOSARTAN POTASSIUM 50 MG: 50 TABLET, FILM COATED ORAL at 08:18

## 2020-01-03 RX ADMIN — MORPHINE SULFATE 1 MG: 2 INJECTION, SOLUTION INTRAMUSCULAR; INTRAVENOUS at 12:11

## 2020-01-03 RX ADMIN — Medication 10 ML: at 18:52

## 2020-01-03 RX ADMIN — Medication 10 ML: at 21:09

## 2020-01-03 RX ADMIN — COLCHICINE 0.6 MG: 0.6 TABLET, FILM COATED ORAL at 08:21

## 2020-01-03 RX ADMIN — ATORVASTATIN CALCIUM 10 MG: 10 TABLET, FILM COATED ORAL at 08:20

## 2020-01-03 RX ADMIN — TAMSULOSIN HYDROCHLORIDE 0.4 MG: 0.4 CAPSULE ORAL at 08:19

## 2020-01-03 RX ADMIN — ASPIRIN 81 MG CHEWABLE TABLET 81 MG: 81 TABLET CHEWABLE at 08:21

## 2020-01-03 RX ADMIN — METHYLPREDNISOLONE SODIUM SUCCINATE 80 MG: 125 INJECTION, POWDER, FOR SOLUTION INTRAMUSCULAR; INTRAVENOUS at 08:22

## 2020-01-03 RX ADMIN — TRAZODONE HYDROCHLORIDE 50 MG: 50 TABLET ORAL at 23:15

## 2020-01-03 RX ADMIN — GABAPENTIN 300 MG: 300 CAPSULE ORAL at 21:07

## 2020-01-03 RX ADMIN — ENOXAPARIN SODIUM 40 MG: 40 INJECTION SUBCUTANEOUS at 21:06

## 2020-01-03 RX ADMIN — METHYLPREDNISOLONE SODIUM SUCCINATE 80 MG: 40 INJECTION, POWDER, FOR SOLUTION INTRAMUSCULAR; INTRAVENOUS at 21:09

## 2020-01-03 RX ADMIN — INSULIN LISPRO 10 UNITS: 100 INJECTION, SOLUTION INTRAVENOUS; SUBCUTANEOUS at 12:07

## 2020-01-03 RX ADMIN — TRAMADOL HYDROCHLORIDE 50 MG: 50 TABLET, FILM COATED ORAL at 19:32

## 2020-01-03 RX ADMIN — INSULIN LISPRO 5 UNITS: 100 INJECTION, SOLUTION INTRAVENOUS; SUBCUTANEOUS at 06:42

## 2020-01-03 RX ADMIN — GABAPENTIN 300 MG: 300 CAPSULE ORAL at 18:51

## 2020-01-03 RX ADMIN — GABAPENTIN 300 MG: 300 CAPSULE ORAL at 08:20

## 2020-01-03 RX ADMIN — HUMAN INSULIN 40 UNITS: 100 INJECTION, SUSPENSION SUBCUTANEOUS at 21:00

## 2020-01-03 RX ADMIN — INSULIN LISPRO 12 UNITS: 100 INJECTION, SOLUTION INTRAVENOUS; SUBCUTANEOUS at 18:51

## 2020-01-03 RX ADMIN — OXYCODONE 5 MG: 5 TABLET ORAL at 08:19

## 2020-01-03 RX ADMIN — HYDRALAZINE HYDROCHLORIDE 10 MG: 20 INJECTION INTRAMUSCULAR; INTRAVENOUS at 23:19

## 2020-01-03 NOTE — PROGRESS NOTES
Bedside and Verbal shift change report given to Lesley (oncoming nurse) by Vernon Shah (offgoing nurse). Report included the following information SBAR, Kardex, Intake/Output, MAR and Recent Results.

## 2020-01-03 NOTE — PROGRESS NOTES
Blood sugar elevated noted. NPH added for coverage with methylprednisolone. Will give 12u short acting now.

## 2020-01-03 NOTE — CONSULTS
ORTHO CONSULT    Subjective:     Date of Consultation:  January 3, 2020    Referring Physician:  QUINN    Tish Olivier is a 61 y.o. male who is being seen for right knee pain, difficulty ambulating . Pt reports onset of symptoms 12/24 pain started about the right knee then migrated to his right ankle-- then R wrist/ hand/elbow. States his pain was so severe he couldn't get around his home. Ambulates at baseline unassisted. Denies F/C/Flu like symptoms. Seen by PCP- Dec 13, 2019 started on Indomethacin. Seen in the [facility] ED 12/26- states they were unable to get fluid out of his knee- given oxy- NO DX.  Repeat aspiration attempt revealed no effusion. Cultures taken ngtd.     + HX of gout  Patient reports his PCP is currently working him up for Lupus or Rheumatory Arthritis. Patient Active Problem List    Diagnosis Date Noted    Gout attack 12/31/2019    Acute infective polyarthritis ([address] 12/30/2019     Family History   Problem Relation Age of Onset    Hypertension Mother     Diabetes Mother     Heart Disease Father       Social History     Tobacco Use    Smoking status: Never Smoker    Smokeless tobacco: Never Used   Substance Use Topics    Alcohol use: Yes     Comment: occassionally \"weekends\"     Past Medical History:   Diagnosis Date    Arthritis     Chronic pain     Diabetes ([address]     GERD (gastroesophageal reflux disease)     Gout     Hypertension     Other ill-defined conditions(799.89)     gout      Past Surgical History:   Procedure Laterality Date    HX APPENDECTOMY      HX ORTHOPAEDIC      RIGHT knee scope      Prior to Admission medications    Medication Sig Start Date End Date Taking? Authorizing Provider   oxyCODONE IR (ROXICODONE) 5 mg immediate release tablet Take 5 mg by mouth every six (6) hours as needed for Pain. Yes Provider, Historical   atorvastatin (LIPITOR) 10 mg tablet Take 10 mg by mouth daily.    Yes Provider, Historical   amLODIPine (NORVASC) 10 mg tablet Take 10 mg by mouth daily. Yes Provider, Historical   dulaglutide (TRULICITY) 1.5 SE/8.8 mL sub-q pen 1.5 mg by SubCUTAneous route every seven (7) days. Yes Provider, Historical   gabapentin (NEURONTIN) 300 mg capsule Take 300 mg by mouth three (3) times daily. Yes Provider, Historical   indomethacin SR (INDOCIN SR) 75 mg SR capsule Take 75 mg by mouth daily. Yes Provider, Historical   colchicine 0.6 mg tablet Take 0.6 mg by mouth daily. Yes Other, MD Lia   NIFEdipine ER (ADALAT CC) 90 mg ER tablet Take 90 mg by mouth daily. 1/24/19  Yes Other, MD Lia   melatonin 5 mg cap capsule Take 5 mg by mouth nightly. Yes Other, MD Lia   traZODone (DESYREL) 50 mg tablet Take 50 mg by mouth as needed for Sleep. Yes Other, MD Lia   irbesartan (AVAPRO) 300 mg tablet Take 300 mg by mouth daily. Yes Other, MD Lia   tamsulosin (FLOMAX) 0.4 mg capsule Take 0.4 mg by mouth daily. Yes Other, MD Lia   aspirin 81 mg chewable tablet Take 81 mg by mouth daily. Yes Other, MD Lia   metFORMIN (GLUCOPHAGE) 1,000 mg tablet Take 1,000 mg by mouth two (2) times daily (with meals).    Yes Provider, Historical     Current Facility-Administered Medications   Medication Dose Route Frequency    [START ON 1/4/2020] NIFEdipine ER (ADALAT CC) tablet 90 mg  90 mg Oral DAILY    methylPREDNISolone (PF) (SOLU-MEDROL) injection 80 mg  80 mg IntraVENous Q12H    And    insulin NPH (NOVOLIN N, HUMULIN N) injection 40 Units  40 Units SubCUTAneous Q12H    morphine injection 1 mg  1 mg IntraVENous Q4H PRN    traZODone (DESYREL) tablet 50 mg  50 mg Oral QHS PRN    hydrALAZINE (APRESOLINE) 20 mg/mL injection 10 mg  10 mg IntraVENous Q6H PRN    amLODIPine (NORVASC) tablet 10 mg  10 mg Oral DAILY    atorvastatin (LIPITOR) tablet 10 mg  10 mg Oral DAILY    gabapentin (NEURONTIN) capsule 300 mg  300 mg Oral TID    oxyCODONE IR (ROXICODONE) tablet 5 mg  5 mg Oral Q6H PRN    traMADol (ULTRAM) tablet 50 mg  50 mg Oral Q6H PRN    aspirin chewable tablet 81 mg  81 mg Oral DAILY    colchicine tablet 0.6 mg  0.6 mg Oral DAILY    tamsulosin (FLOMAX) capsule 0.4 mg  0.4 mg Oral DAILY    sodium chloride (NS) flush 5-40 mL  5-40 mL IntraVENous Q8H    sodium chloride (NS) flush 5-40 mL  5-40 mL IntraVENous PRN    acetaminophen (TYLENOL) solution 650 mg  650 mg Oral Q4H PRN    docusate sodium (COLACE) capsule 100 mg  100 mg Oral BID    enoxaparin (LOVENOX) injection 40 mg  40 mg SubCUTAneous Q24H    insulin lispro (HUMALOG) injection   SubCUTAneous AC&HS    glucose chewable tablet 16 g  4 Tab Oral PRN    glucagon (GLUCAGEN) injection 1 mg  1 mg IntraMUSCular PRN    losartan (COZAAR) tablet 50 mg  50 mg Oral DAILY     Allergies   Allergen Reactions    Uloric [Febuxostat] Anaphylaxis    Allopurinol Hives        Review of Systems:  Pertinent items are noted in HPI. Objective:     Patient Vitals for the past 8 hrs:   BP Temp Pulse Resp SpO2   20 1053 156/87 97.5 °F (36.4 °C) 78 18 94 %   20 0805 (!) 134/91 97.9 °F (36.6 °C) 82 16 92 %     Temp (24hrs), Av.1 °F (36.7 °C), Min:97.5 °F (36.4 °C), Max:99.1 °F (37.3 °C)      Gen: Well-developed,  in no acute distress   HEENT: Pink conjunctivae, hearing intact to voice, moist mucous membranes   Neck: Supple  Resp: No respiratory distress   Card: RRR, palpable distal pulse-equal bilaterally, birsk cap refill all distal digits   Abd:  non-distended  Musc: right upper and lower ext joints less tender, minimal swelling. Right knee w no effusion. ROM R knee  deg active. ROM guarded. NO erythema or streaking   Skin: No skin breakdown noted.  Skin warm, pink, dry  Neuro: Cranial nerves are grossly intact, no focal motor weakness, follows commands appropriately   Psych: Good insight, oriented to person, place and time, alert         Data Review   Recent Results (from the past 24 hour(s))   GLUCOSE, POC    Collection Time: 20  3:59 PM   Result Value Ref Range    Glucose (POC) 312 (H) 65 - 100 mg/dL    Performed by Elin Chino (PILI)    GLUCOSE, POC    Collection Time: 01/02/20  9:00 PM   Result Value Ref Range    Glucose (POC) 417 (H) 65 - 100 mg/dL    Performed by Ana ALEJANDRE    RHEUMATOID FACTOR, QT    Collection Time: 01/03/20  4:51 AM   Result Value Ref Range    Rheumatoid factor <10 <15 IU/mL   GLUCOSE, POC    Collection Time: 01/03/20  5:27 AM   Result Value Ref Range    Glucose (POC) 291 (H) 65 - 100 mg/dL    Performed by Anyi Jimenez    GLUCOSE, POC    Collection Time: 01/03/20 11:18 AM   Result Value Ref Range    Glucose (POC) 382 (H) 65 - 100 mg/dL    Performed by Sintia Powell      Right knee xrays show severe OA      Assessment/Plan:     Polyarthragia and R knee djd    Pain control  Treatment per rheumatology/primary team  Currently on steroids  Ice   Activities as tolerated  Can follow up with our joint specialist for discussion of tka if needed in the future      Ruslan Xiong DO

## 2020-01-03 NOTE — PROGRESS NOTES
Problem: Self Care Deficits Care Plan (Adult)  Goal: *Acute Goals and Plan of Care (Insert Text)  Description      FUNCTIONAL STATUS PRIOR TO ADMISSION: Patient was independent and active without use of DME.    HOME SUPPORT: The patient lived with wife. Occupational Therapy Goals  Initiated 1/3/2020  1. Patient will perform grooming standing at sink with supervision/set-up within 7 day(s). 2.  Patient will perform upper body dressing with supervision/set-up within 7 day(s). 3.  Patient will perform lower body dressing with supervision/set-up within 7 day(s). 4.  Patient will perform toilet transfers with supervision/set-up within 7 day(s). 5.  Patient will perform all aspects of toileting with supervision/set-up within 7 day(s). 6.  Patient will perform sponge bathing with supervision/set-up within 7 day(s). Outcome: Progressing Towards Goal      OCCUPATIONAL THERAPY EVALUATION  Patient: Bhavna Obando (77 y.o. male)  Date: 1/3/2020  Primary Diagnosis: Acute infective polyarthritis ([address] [M00.9]  Gout attack [M10.9]        Precautions:   Fall    ASSESSMENT  Based on the objective data described below, the patient presents with, improving, but still significant R elbow, hand wrist, knee and ankle pain, as well as decreased standing balance which is impairing his functional independence. The patient is functioning below his independent baseline, now performing ADLs at a supervision/setup to mod A level and is independent to min A for functional mobility. The patient will benefit from acute OT intervention and he may need inpatient rehab after discharge if his pain continues to be this significant. Functional Outcome Measure: The patient scored 40/100 on the Barthel Index outcome measure which is indicative of a 60% impairment in function.           PLAN :  Recommendations and Planned Interventions: self care training, functional mobility training, therapeutic exercise, balance training, endurance activities, patient education, home safety training and family training/education    Frequency/Duration: Patient will be followed by occupational therapy 4 times a week to address goals. Recommendation for discharge: (in order for the patient to meet his/her long term goals)  Therapy 3 hours per day 5-7 days per week VS Home with [facility] therapies pending improvement in pain. Equipment recommendations for successful discharge (if) home: TBD pending progress. Presently needs a RW with forearm platform to mobilize. OBJECTIVE DATA SUMMARY:   HISTORY:   Past Medical History:   Diagnosis Date    Arthritis     Chronic pain     Diabetes ([address]     GERD (gastroesophageal reflux disease)     Gout     Hypertension     Other ill-defined conditions(799.89)     gout     Past Surgical History:   Procedure Laterality Date    HX APPENDECTOMY      HX ORTHOPAEDIC      RIGHT knee scope       Expanded or extensive additional review of patient history:     Home Situation  Home Environment: Private residence  # Steps to Enter: 1  One/Two Story Residence: Two story  # of Interior Steps: 13  Height of Each Step (in): 8 inches  Interior Rails: Right  Lift Chair Available: No  Living Alone: No  Support Systems: Child(lakshmi), [employer], Family member(s), Friends \ neighbors  Patient Expects to be Discharged to[de-identified] Private residence  Current DME Used/Available at Home: Glucometer    Hand dominance: Right    EXAMINATION OF PERFORMANCE DEFICITS:  Cognitive/Behavioral Status:  Neurologic State: Alert  Orientation Level: Oriented X4  Cognition: Appropriate decision making; Appropriate for age attention/concentration; Appropriate safety awareness; Follows commands  Perception: Appears intact     Safety/Judgement: Awareness of environment; Insight into deficits;Good awareness of safety precautions      Hearing:   Auditory  Auditory Impairment: None    Vision/Perceptual:    Acuity: Within Defined Limits    Corrective Lenses: Glasses    Range of Motion:  AROM: Generally decreased, functional(RUE, R ankle, R hip, nonfunctional R knee LUE/LE WNL)    Strength:  Strength: Generally decreased, functional(RUE, R ankle, R hip, nonfunctional R knee LUE/LE WNL)    Coordination:  Coordination: Generally decreased, functional(RUE and RLE pain limited, LUE/LE WNL)  Fine Motor Skills-Upper: Left Intact; Right Impaired    Gross Motor Skills-Upper: Left Intact; Right Impaired    Tone & Sensation:     Sensation: Intact       Balance:  Sitting: Intact  Standing: Impaired  Standing - Static: Good;Constant support  Standing - Dynamic : Fair;Constant support    Functional Mobility and Transfers for ADLs:  Bed Mobility:  Scooting: Independent    Transfers:  Sit to Stand: Minimum assistance  Stand to Sit: Minimum assistance  Bed to Chair: Minimum assistance(ambulating with platform RW)  Bathroom Mobility: Minimum assistance(ambulating with platform RW)  Toilet Transfer : Minimum assistance(using grab bar)    ADL Assessment:  Feeding: Supervision;Setup(using non-dominant L hand)    Oral Facial Hygiene/Grooming: Contact guard assistance(using non-dominant L hand)    Bathing: Moderate assistance    Upper Body Dressing: Moderate assistance    Lower Body Dressing: Moderate assistance    Toileting: Contact guard assistance; Moderate assistance(using non-dominant L hand)                ADL Intervention and task modifications:  Grooming  Washing Hands: Contact guard assistance(standing at sink)     Lower Body Dressing Assistance  Socks: Supervision;Set-up(for L sock, total A for R sock)  Position Performed: Seated edge of bed;Bending forward method  Cues: Verbal cues provided    Toileting  Toileting Assistance: Contact guard assistance  Bladder Hygiene: Supervision;Set-up  Bowel Hygiene: Supervision;Set-up  Clothing Management: Contact guard assistance  Adaptive Equipment: Grab bars    Cognitive Retraining  Safety/Judgement: Awareness of environment; Insight into deficits;Good awareness of safety precautions    Functional Measure:  Barthel Index:    Bathin  Bladder: 10  Bowels: 10  Groomin  Dressin  Feedin  Mobility: 0  Stairs: 0  Toilet Use: 5  Transfer (Bed to Chair and Back): 10  Total: 40/100        Percentage of impairment   0%   1-19%   20-39%   40-59%   60-79%   80-99%   100%   Barthel Score 0-100 100 99-80 79-60 59-40 20-39 1-19   0     The Barthel ADL Index: Guidelines  1. The index should be used as a record of what a patient does, not as a record of what a patient could do. 2. The main aim is to establish degree of independence from any help, physical or verbal, however minor and for whatever reason. 3. The need for supervision renders the patient not independent. 4. A patient's performance should be established using the best available evidence. Asking the patient, friends/relatives and nurses are the usual sources, but direct observation and common sense are also important. However direct testing is not needed. 5. Usually the patient's performance over the preceding 24-48 hours is important, but occasionally longer periods will be relevant. 6. Middle categories imply that the patient supplies over 50 per cent of the effort. 7. Use of aids to be independent is allowed. Kamilah Fraire., Barthel, D.W. (9809). Functional evaluation: the Barthel Index. [address] [facility] (14)2. CORY Barker Ace, Padmini [facility] Katharina Mo., [place], [address] [place] [address] (). Measuring the change indisability after inpatient rehabilitation; comparison of the responsiveness of the Barthel Index and Functional [place] Measure. Journal of Neurology, Neurosurgery, and Psychiatry, 66(4), 716-867. Timothy Almaguer, N.J.A, ANTONIA Martinez.J.M, & Triston Rendon M.A. (2004.) Assessment of post-stroke quality of life in cost-effectiveness studies: The usefulness of the Barthel Index and the EuroQoL-5D.  Quality of Life Research, 13, 427-43        Pain Ratin-7/10 Pain in R elbow, hand, wrist, knee and ankle. Activity Tolerance:   Fair  Please refer to the flowsheet for vital signs taken during this treatment. After treatment patient left in no apparent distress:    Sitting in chair and Call bell within reach    COMMUNICATION/EDUCATION:   The patients plan of care was discussed with: Physical Therapist and Registered Nurse. Home safety education was provided and the patient/caregiver indicated understanding., Patient/family have participated as able in goal setting and plan of care. and Patient/family agree to work toward stated goals and plan of care. This patients plan of care is appropriate for delegation to HECTOR.     Thank you for this referral.  Ayush Pagan, OTR/L  Time Calculation: 33 mins

## 2020-01-03 NOTE — PROGRESS NOTES
PAULINA: likely rehab  1) patient rehab choice    CM acknowledged patient's progress with therapy today. CM has provided inpatient rehab list to patient as well as PCP list that patient requested yesterday. Patient requested time to look list over.      Adam Hodges, [address], [address]

## 2020-01-03 NOTE — PROGRESS NOTES
Ortho:    Polyarthralgia ----  Pt notes some improvement with steroids  No growth from R knee culture-12/30  Right knee w/o sig effusion, No erythema or signs of infection  WBC 13 today  Afebrile    Rheumatology consulting   Will sign off,  Re-consult as needed     Plan per Dr Golden Ny, PANeerajC

## 2020-01-03 NOTE — PROGRESS NOTES
Bedside and Verbal shift change report given to Claudia Dickerson (oncoming nurse) by Lesley (offgoing nurse). Report included the following information SBAR, Kardex, ED Summary, Procedure Summary, Intake/Output, MAR, Accordion and Recent Results.

## 2020-01-03 NOTE — PROGRESS NOTES
Hospitalist Progress Note    NAME: Eneida Rowe   :  1960   MRN:  [id number]       Assessment / Plan:  Acute polyarthritis  -appreciate rheumatology consult of Dr Onofre Shelby  -steroids switched to IV at increased dose  -Rt knee arthrocentesis w minimal fluid, no crystals, no growth so far  -gout thought less likely, vs RA or ankylosing spondylitis  -lab workup unerway, rf/ccp/hlab27/joanna/IF pending  -uric acid 7.5 12.30, CRP > 10  -hasn't been able to tolerate allopurinol or other urate lower meds so far  -failed NSAIDs outpt w indomethacin  -pain improved with higher dose of steroids  -PT/OT  -ortho following    CKD 3  -Cr appears at baseline  -urate nephropathy or DM nephropathy may be contributing     HTN  -BP elevated likely due to pain  -Continue losartan amlodipine  -hydralazine prn     T2DM  -A1C 6.9  -hold metfomin  -SSI     Obesity    Body mass index is 38.41 kg/m².     Code status: Full  Prophylaxis: Lovenox  Recommended Disposition: Home w/Family     Subjective:     Chief Complaint / Reason for Physician Visit  Pain improved to the point that patient able to use his walker a little. No new complaints    Discussed with RN events overnight. Review of Systems:  Symptom Y/N Comments  Symptom Y/N Comments   Fever/Chills n   Chest Pain n    Poor Appetite    Edema     Cough n   Abdominal Pain n    Sputum n   Joint Pain y improved   SOB/CSASIDY n   Pruritis/Rash     Nausea/vomit n   Tolerating PT/OT     Diarrhea    Tolerating Diet y    Constipation    Other       Could NOT obtain due to:      Objective:     VITALS:   Last 24hrs VS reviewed since prior progress note.  Most recent are:  Patient Vitals for the past 24 hrs:   Temp Pulse Resp BP SpO2   20 0805 97.9 °F (36.6 °C) 82 16 (!) 134/91 92 %   20 0352 99.1 °F (37.3 °C) 74 18 146/90 95 %   20 1931 98.5 °F (36.9 °C) 77 16 (!) 149/92 95 %   20 1537 97.7 °F (36.5 °C) 70 16 154/83 97 %   20 1109 98.2 °F (36.8 °C) 76 16 163/90 96 %       Intake/Output Summary (Last 24 hours) at 1/3/2020 0907  Last data filed at 1/3/2020 0857  Gross per 24 hour   Intake 240 ml   Output 2600 ml   Net -2360 ml        PHYSICAL EXAM:  General: WD, WN. Alert, cooperative, no acute distress    EENT:  EOMI. Anicteric sclerae. MMM  Resp:  CTA bilaterally, no wheezing or rales. No accessory muscle use  CV:  Regular  rhythm,  No edema  GI:  Soft, Non distended, Non tender.  +Bowel sounds  Neurologic:  Alert and oriented X 3, normal speech,   Psych:   Good insight. Not anxious nor agitated  Skin:  No rashes. No jaundice    Reviewed most current lab test results and cultures  YES  Reviewed most current radiology test results   YES  Review and summation of old records today    NO  Reviewed patient's current orders and MAR    YES  PMH/SH reviewed - no change compared to H&P  ________________________________________________________________________  Care Plan discussed with:    Comments   Patient x    Family      RN x    Care Manager     Consultant                        Multidiciplinary team rounds were held today with , nursing, pharmacist and clinical coordinator. Patient's plan of care was discussed; medications were reviewed and discharge planning was addressed. ________________________________________________________________________  Total NON critical care TIME:  30   Minutes    Total CRITICAL CARE TIME Spent:   Minutes non procedure based      Comments   >50% of visit spent in counseling and coordination of care x    ________________________________________________________________________  Tamia Hopper DO     Procedures: see electronic medical records for all procedures/Xrays and details which were not copied into this note but were reviewed prior to creation of Plan. LABS:  I reviewed today's most current labs and imaging studies.   Pertinent labs include:  Recent Labs     01/02/20  0231 01/01/20  0355   WBC 13.0* 14.3*   HGB 13.1 13.2   HCT 41.1 40.7    298     Recent Labs     01/02/20  0231 01/01/20  0355    141   K 3.8 4.0    106   CO2 32 30   * 177*   BUN 18 18   CREA 1.27 1.25   CA 9.1 9.8       Signed: Jaja Waterman DO

## 2020-01-03 NOTE — CONSULTS
Spoke with patient - he is feeling better on IV steroids  Would continue for 1-2 more days  And would then change over to prednisone 60mg po daily that he can taper to 40mg daily if sx improve significantly   we will call patient to follow up after discharge    RA Ab and hla b27  Marker are pending

## 2020-01-03 NOTE — PROGRESS NOTES
Problem: Mobility Impaired (Adult and Pediatric)  Goal: *Acute Goals and Plan of Care (Insert Text)  Description  FUNCTIONAL STATUS PRIOR TO ADMISSION: Patient was independent and active without use of DME. Has been bed bound for the past 3 weeks secondary to R knee pain, R ankle and R elbow. Was previously able to ambulate with crutches when pain in knee first began. HOME SUPPORT PRIOR TO ADMISSION: The patient lived with wife but did not require assist prior to issue. Family assisted him after he was unable to tolerate walking but they work during the day. Physical Therapy Goals  Initiated 1/2/2020  1. Patient will move from supine to sit and sit to supine  in bed with modified independence within 7 day(s). 2.  Patient will transfer from bed to chair and chair to bed with modified independence using the least restrictive device within 7 day(s). 3.  Patient will perform sit to stand with modified independence within 7 day(s). 4.  Patient will ambulate with modified independence for 20 feet with the least restrictive device within 7 day(s). Outcome: Progressing Towards Goal   PHYSICAL THERAPY TREATMENT  Patient: Hilario Melo (42 y.o. male)  Date: 1/3/2020  Diagnosis: Acute infective polyarthritis ([facility] [address] [M00.9]  Gout attack [M10.9]   <principal problem not specified>       Precautions: Fall  Chart, physical therapy assessment, plan of care and goals were reviewed. ASSESSMENT  Patient continues with skilled PT services and is progressing towards goals. Patient continues to report increased pain and have difficulty with R LE weight bearing. Patient demonstrates the ability to mobilize about 10-15 ft before requiring Min A to manage rolling walker secondary to fatigue and pain.     Current Level of Function Impacting Discharge (mobility/balance): Min A 1-2 with use of platform walker     Other factors to consider for discharge: limited mobility, pain, lives alone, no adaptive equipment at home, PLOF, stair training         PLAN :  Patient continues to benefit from skilled intervention to address the above impairments. Continue treatment per established plan of care. to address goals. Recommendation for discharge: (in order for the patient to meet his/her long term goals)  Therapy 3 hours per day 5-7 days per week    This discharge recommendation:  Has been made in collaboration with the attending provider and/or case management    IF patient discharges home will need the following DME: to be determined (TBD)       SUBJECTIVE:   Patient stated I can try.     OBJECTIVE DATA SUMMARY:   Critical Behavior:  Neurologic State: Alert  Orientation Level: Oriented X4  Cognition: Appropriate decision making, Appropriate for age attention/concentration, Appropriate safety awareness, Follows commands  Safety/Judgement: Awareness of environment, Insight into deficits, Good awareness of safety precautions  Functional Mobility Training:  Bed Mobility:           Scooting: Independent        Transfers:  Sit to Stand: Minimum assistance  Stand to Sit: Minimum assistance        Bed to Chair: Minimum assistance                    Balance:  Sitting: Intact  Standing: Impaired  Standing - Static: Good;Constant support  Standing - Dynamic : Fair;Constant support  Ambulation/Gait Training:  Distance (ft): 15 Feet (ft)  Assistive Device: Gait belt;Walker, rolling(right platform)  Ambulation - Level of Assistance: Minimal assistance        Gait Abnormalities: Antalgic;Decreased step clearance; Step to gait           Stance: Right decreased  Speed/Patricia: Pace decreased (<100 feet/min); Shuffled  Step Length: Left shortened;Right shortened                    Stairs: Therapeutic Exercises:     Pain Rating:      Activity Tolerance:   Good and significant pain with mobility, inability to fully weight bear through R LE  Please refer to the flowsheet for vital signs taken during this treatment.     After treatment patient left in no apparent distress:   Sitting in chair and Call bell within reach    COMMUNICATION/COLLABORATION:   The patients plan of care was discussed with: Registered Nurse    Radha Corona, PT   Time Calculation: 8 mins

## 2020-01-04 LAB
ANION GAP SERPL CALC-SCNC: 5 MMOL/L (ref 5–15)
BUN SERPL-MCNC: 22 MG/DL (ref 6–20)
BUN/CREAT SERPL: 18 (ref 12–20)
CALCIUM SERPL-MCNC: 9.8 MG/DL (ref 8.5–10.1)
CCP IGA+IGG SERPL IA-ACNC: 6 UNITS (ref 0–19)
CHLORIDE SERPL-SCNC: 103 MMOL/L (ref 97–108)
CO2 SERPL-SCNC: 29 MMOL/L (ref 21–32)
CREAT SERPL-MCNC: 1.21 MG/DL (ref 0.7–1.3)
ERYTHROCYTE [DISTWIDTH] IN BLOOD BY AUTOMATED COUNT: 12.6 % (ref 11.5–14.5)
GLUCOSE BLD STRIP.AUTO-MCNC: 265 MG/DL (ref 65–100)
GLUCOSE BLD STRIP.AUTO-MCNC: 310 MG/DL (ref 65–100)
GLUCOSE BLD STRIP.AUTO-MCNC: 394 MG/DL (ref 65–100)
GLUCOSE BLD STRIP.AUTO-MCNC: 407 MG/DL (ref 65–100)
GLUCOSE SERPL-MCNC: 276 MG/DL (ref 65–100)
HCT VFR BLD AUTO: 41.5 % (ref 36.6–50.3)
HGB BLD-MCNC: 13.4 G/DL (ref 12.1–17)
MCH RBC QN AUTO: 28.5 PG (ref 26–34)
MCHC RBC AUTO-ENTMCNC: 32.3 G/DL (ref 30–36.5)
MCV RBC AUTO: 88.3 FL (ref 80–99)
NRBC # BLD: 0 K/UL (ref 0–0.01)
NRBC BLD-RTO: 0 PER 100 WBC
PLATELET # BLD AUTO: 339 K/UL (ref 150–400)
PMV BLD AUTO: 9.4 FL (ref 8.9–12.9)
POTASSIUM SERPL-SCNC: 4 MMOL/L (ref 3.5–5.1)
RBC # BLD AUTO: 4.7 M/UL (ref 4.1–5.7)
SERVICE CMNT-IMP: ABNORMAL
SODIUM SERPL-SCNC: 137 MMOL/L (ref 136–145)
WBC # BLD AUTO: 18.1 K/UL (ref 4.1–11.1)

## 2020-01-04 PROCEDURE — 93005 ELECTROCARDIOGRAM TRACING: CPT

## 2020-01-04 PROCEDURE — 74011250636 HC RX REV CODE- 250/636: Performed by: INTERNAL MEDICINE

## 2020-01-04 PROCEDURE — 74011636637 HC RX REV CODE- 636/637: Performed by: INTERNAL MEDICINE

## 2020-01-04 PROCEDURE — 36415 COLL VENOUS BLD VENIPUNCTURE: CPT

## 2020-01-04 PROCEDURE — 74011250637 HC RX REV CODE- 250/637: Performed by: INTERNAL MEDICINE

## 2020-01-04 PROCEDURE — 99218 HC RM OBSERVATION: CPT

## 2020-01-04 PROCEDURE — 74011250637 HC RX REV CODE- 250/637: Performed by: HOSPITALIST

## 2020-01-04 PROCEDURE — 85027 COMPLETE CBC AUTOMATED: CPT

## 2020-01-04 PROCEDURE — 82962 GLUCOSE BLOOD TEST: CPT

## 2020-01-04 PROCEDURE — 80048 BASIC METABOLIC PNL TOTAL CA: CPT

## 2020-01-04 PROCEDURE — 94760 N-INVAS EAR/PLS OXIMETRY 1: CPT

## 2020-01-04 RX ORDER — MAGNESIUM SULFATE 100 %
4 CRYSTALS MISCELLANEOUS AS NEEDED
Status: DISCONTINUED | OUTPATIENT
Start: 2020-01-04 | End: 2020-01-11 | Stop reason: HOSPADM

## 2020-01-04 RX ORDER — DEXTROSE MONOHYDRATE 100 MG/ML
0-250 INJECTION, SOLUTION INTRAVENOUS AS NEEDED
Status: DISCONTINUED | OUTPATIENT
Start: 2020-01-04 | End: 2020-01-11 | Stop reason: HOSPADM

## 2020-01-04 RX ORDER — INSULIN LISPRO 100 [IU]/ML
INJECTION, SOLUTION INTRAVENOUS; SUBCUTANEOUS
Status: DISCONTINUED | OUTPATIENT
Start: 2020-01-05 | End: 2020-01-04

## 2020-01-04 RX ORDER — INSULIN LISPRO 100 [IU]/ML
15 INJECTION, SOLUTION INTRAVENOUS; SUBCUTANEOUS ONCE
Status: COMPLETED | OUTPATIENT
Start: 2020-01-04 | End: 2020-01-04

## 2020-01-04 RX ADMIN — GABAPENTIN 300 MG: 300 CAPSULE ORAL at 23:27

## 2020-01-04 RX ADMIN — Medication 10 ML: at 14:48

## 2020-01-04 RX ADMIN — Medication 10 ML: at 05:06

## 2020-01-04 RX ADMIN — METHYLPREDNISOLONE SODIUM SUCCINATE 80 MG: 40 INJECTION, POWDER, FOR SOLUTION INTRAMUSCULAR; INTRAVENOUS at 08:43

## 2020-01-04 RX ADMIN — AMLODIPINE BESYLATE 10 MG: 5 TABLET ORAL at 08:43

## 2020-01-04 RX ADMIN — Medication 10 ML: at 23:28

## 2020-01-04 RX ADMIN — TRAMADOL HYDROCHLORIDE 50 MG: 50 TABLET, FILM COATED ORAL at 14:48

## 2020-01-04 RX ADMIN — MORPHINE SULFATE 1 MG: 2 INJECTION, SOLUTION INTRAMUSCULAR; INTRAVENOUS at 19:58

## 2020-01-04 RX ADMIN — GABAPENTIN 300 MG: 300 CAPSULE ORAL at 08:43

## 2020-01-04 RX ADMIN — MORPHINE SULFATE 1 MG: 2 INJECTION, SOLUTION INTRAMUSCULAR; INTRAVENOUS at 08:58

## 2020-01-04 RX ADMIN — ASPIRIN 81 MG CHEWABLE TABLET 81 MG: 81 TABLET CHEWABLE at 08:44

## 2020-01-04 RX ADMIN — COLCHICINE 0.6 MG: 0.6 TABLET, FILM COATED ORAL at 08:56

## 2020-01-04 RX ADMIN — HUMAN INSULIN 70 UNITS: 100 INJECTION, SUSPENSION SUBCUTANEOUS at 23:27

## 2020-01-04 RX ADMIN — ATORVASTATIN CALCIUM 10 MG: 10 TABLET, FILM COATED ORAL at 08:43

## 2020-01-04 RX ADMIN — TRAMADOL HYDROCHLORIDE 50 MG: 50 TABLET, FILM COATED ORAL at 05:05

## 2020-01-04 RX ADMIN — INSULIN LISPRO 5 UNITS: 100 INJECTION, SOLUTION INTRAVENOUS; SUBCUTANEOUS at 08:42

## 2020-01-04 RX ADMIN — INSULIN LISPRO 7 UNITS: 100 INJECTION, SOLUTION INTRAVENOUS; SUBCUTANEOUS at 12:36

## 2020-01-04 RX ADMIN — TRAZODONE HYDROCHLORIDE 50 MG: 50 TABLET ORAL at 23:28

## 2020-01-04 RX ADMIN — HUMAN INSULIN 50 UNITS: 100 INJECTION, SUSPENSION SUBCUTANEOUS at 08:42

## 2020-01-04 RX ADMIN — INSULIN LISPRO 15 UNITS: 100 INJECTION, SOLUTION INTRAVENOUS; SUBCUTANEOUS at 18:07

## 2020-01-04 RX ADMIN — LOSARTAN POTASSIUM 50 MG: 50 TABLET, FILM COATED ORAL at 08:44

## 2020-01-04 RX ADMIN — METHYLPREDNISOLONE SODIUM SUCCINATE 80 MG: 40 INJECTION, POWDER, FOR SOLUTION INTRAMUSCULAR; INTRAVENOUS at 23:25

## 2020-01-04 RX ADMIN — ENOXAPARIN SODIUM 40 MG: 40 INJECTION SUBCUTANEOUS at 23:24

## 2020-01-04 RX ADMIN — TAMSULOSIN HYDROCHLORIDE 0.4 MG: 0.4 CAPSULE ORAL at 08:44

## 2020-01-04 RX ADMIN — NIFEDIPINE 90 MG: 30 TABLET, FILM COATED, EXTENDED RELEASE ORAL at 09:00

## 2020-01-04 RX ADMIN — GABAPENTIN 300 MG: 300 CAPSULE ORAL at 16:33

## 2020-01-04 NOTE — PROGRESS NOTES
Bedside shift change report given to Anahy Bolivar RN (oncoming nurse) by Cosme Bell RN (offgoing nurse). Report included the following information SBAR, Kardex, Intake/Output, MAR and Recent Results.

## 2020-01-04 NOTE — PROGRESS NOTES
[place]:   [facility]    CM met with patient at bedside re: inpatient rehab. FOC was offered and patient chose [facility] for referral. Signed FOC on bedside chart. CM sent referral to [facility] via [employer]. No further questions or needs identified at this time. CM will continue to follow for discharge needs. The Plan for Transition of Care is related to the following treatment goals: Inpatient Rehab    The Patient and/or patient representative was provided with a choice of provider and agrees   with the discharge plan. [x] Yes [] No    Freedom of choice list was provided with basic dialogue that supports the patient's individualized plan of care/goals, treatment preferences and shares the quality data associated with the providers.  [x] Yes [] No       Anastasiya Holt, MSW, LSW  Supervisee in Social Work  [place] [place]  [phone]

## 2020-01-04 NOTE — PROGRESS NOTES
Bedside and Verbal shift change report given to Daylin Stack RN (oncoming nurse) by Charlie Petit RN (offgoing nurse). Report included the following information SBAR, Kardex, Intake/Output, MAR, Recent Results and Med Rec Status.

## 2020-01-04 NOTE — PROGRESS NOTES
Patient verbalizes understanding of increased FSBS due to steroids   Verbalizes understanding of use of sliding scale insulin and insulin adjustments based on glucose values  Moving a little better now he states  pt says swelling is starting to decrease

## 2020-01-04 NOTE — PROGRESS NOTES
Nifedipine medication will not scan. Messaged pharmacy and received new medication with also will not scan. Was instructed by pharmacist to verify medication with 2nd nurse and administer. 1615 - Medication verified with Keegan Newman RN.

## 2020-01-05 PROBLEM — M10.9 GOUT FLARE: Status: ACTIVE | Noted: 2020-01-05

## 2020-01-05 PROBLEM — M10.9 ACUTE GOUTY ARTHRITIS: Status: ACTIVE | Noted: 2020-01-05

## 2020-01-05 LAB
ATRIAL RATE: 74 BPM
CALCULATED P AXIS, ECG09: 61 DEGREES
CALCULATED R AXIS, ECG10: -29 DEGREES
CALCULATED T AXIS, ECG11: -15 DEGREES
DIAGNOSIS, 93000: NORMAL
GLUCOSE BLD STRIP.AUTO-MCNC: 245 MG/DL (ref 65–100)
GLUCOSE BLD STRIP.AUTO-MCNC: 304 MG/DL (ref 65–100)
GLUCOSE BLD STRIP.AUTO-MCNC: 310 MG/DL (ref 65–100)
GLUCOSE BLD STRIP.AUTO-MCNC: 368 MG/DL (ref 65–100)
GLUCOSE BLD STRIP.AUTO-MCNC: 407 MG/DL (ref 65–100)
P-R INTERVAL, ECG05: 154 MS
Q-T INTERVAL, ECG07: 364 MS
QRS DURATION, ECG06: 96 MS
QTC CALCULATION (BEZET), ECG08: 404 MS
SERVICE CMNT-IMP: ABNORMAL
VENTRICULAR RATE, ECG03: 74 BPM

## 2020-01-05 PROCEDURE — 74011636637 HC RX REV CODE- 636/637: Performed by: INTERNAL MEDICINE

## 2020-01-05 PROCEDURE — 65270000029 HC RM PRIVATE

## 2020-01-05 PROCEDURE — 74011250637 HC RX REV CODE- 250/637: Performed by: HOSPITALIST

## 2020-01-05 PROCEDURE — 74011250636 HC RX REV CODE- 250/636: Performed by: INTERNAL MEDICINE

## 2020-01-05 PROCEDURE — 74011250637 HC RX REV CODE- 250/637: Performed by: INTERNAL MEDICINE

## 2020-01-05 PROCEDURE — 99218 HC RM OBSERVATION: CPT

## 2020-01-05 PROCEDURE — 82962 GLUCOSE BLOOD TEST: CPT

## 2020-01-05 RX ORDER — INSULIN LISPRO 100 [IU]/ML
10 INJECTION, SOLUTION INTRAVENOUS; SUBCUTANEOUS ONCE
Status: COMPLETED | OUTPATIENT
Start: 2020-01-05 | End: 2020-01-05

## 2020-01-05 RX ORDER — INSULIN LISPRO 100 [IU]/ML
15 INJECTION, SOLUTION INTRAVENOUS; SUBCUTANEOUS ONCE
Status: COMPLETED | OUTPATIENT
Start: 2020-01-05 | End: 2020-01-05

## 2020-01-05 RX ORDER — INSULIN GLARGINE 100 [IU]/ML
30 INJECTION, SOLUTION SUBCUTANEOUS
Status: DISCONTINUED | OUTPATIENT
Start: 2020-01-05 | End: 2020-01-07

## 2020-01-05 RX ADMIN — TRAMADOL HYDROCHLORIDE 50 MG: 50 TABLET, FILM COATED ORAL at 23:13

## 2020-01-05 RX ADMIN — Medication 10 ML: at 17:43

## 2020-01-05 RX ADMIN — INSULIN LISPRO 15 UNITS: 100 INJECTION, SOLUTION INTRAVENOUS; SUBCUTANEOUS at 13:01

## 2020-01-05 RX ADMIN — MORPHINE SULFATE 1 MG: 2 INJECTION, SOLUTION INTRAMUSCULAR; INTRAVENOUS at 10:03

## 2020-01-05 RX ADMIN — ENOXAPARIN SODIUM 40 MG: 40 INJECTION SUBCUTANEOUS at 23:14

## 2020-01-05 RX ADMIN — GABAPENTIN 300 MG: 300 CAPSULE ORAL at 17:43

## 2020-01-05 RX ADMIN — Medication 10 ML: at 07:05

## 2020-01-05 RX ADMIN — ASPIRIN 81 MG CHEWABLE TABLET 81 MG: 81 TABLET CHEWABLE at 08:32

## 2020-01-05 RX ADMIN — INSULIN LISPRO 4 UNITS: 100 INJECTION, SOLUTION INTRAVENOUS; SUBCUTANEOUS at 23:15

## 2020-01-05 RX ADMIN — COLCHICINE 0.6 MG: 0.6 TABLET, FILM COATED ORAL at 09:51

## 2020-01-05 RX ADMIN — MORPHINE SULFATE 1 MG: 2 INJECTION, SOLUTION INTRAMUSCULAR; INTRAVENOUS at 19:06

## 2020-01-05 RX ADMIN — Medication 10 ML: at 23:14

## 2020-01-05 RX ADMIN — INSULIN LISPRO 7 UNITS: 100 INJECTION, SOLUTION INTRAVENOUS; SUBCUTANEOUS at 17:43

## 2020-01-05 RX ADMIN — AMLODIPINE BESYLATE 10 MG: 5 TABLET ORAL at 08:33

## 2020-01-05 RX ADMIN — TAMSULOSIN HYDROCHLORIDE 0.4 MG: 0.4 CAPSULE ORAL at 08:32

## 2020-01-05 RX ADMIN — TRAMADOL HYDROCHLORIDE 50 MG: 50 TABLET, FILM COATED ORAL at 07:06

## 2020-01-05 RX ADMIN — HUMAN INSULIN 70 UNITS: 100 INJECTION, SUSPENSION SUBCUTANEOUS at 23:15

## 2020-01-05 RX ADMIN — METHYLPREDNISOLONE SODIUM SUCCINATE 80 MG: 40 INJECTION, POWDER, FOR SOLUTION INTRAMUSCULAR; INTRAVENOUS at 08:33

## 2020-01-05 RX ADMIN — TRAZODONE HYDROCHLORIDE 50 MG: 50 TABLET ORAL at 23:13

## 2020-01-05 RX ADMIN — INSULIN LISPRO 10 UNITS: 100 INJECTION, SOLUTION INTRAVENOUS; SUBCUTANEOUS at 01:38

## 2020-01-05 RX ADMIN — ATORVASTATIN CALCIUM 10 MG: 10 TABLET, FILM COATED ORAL at 08:33

## 2020-01-05 RX ADMIN — DOCUSATE SODIUM 100 MG: 100 CAPSULE, LIQUID FILLED ORAL at 17:43

## 2020-01-05 RX ADMIN — DOCUSATE SODIUM 100 MG: 100 CAPSULE, LIQUID FILLED ORAL at 08:33

## 2020-01-05 RX ADMIN — HUMAN INSULIN 70 UNITS: 100 INJECTION, SUSPENSION SUBCUTANEOUS at 08:34

## 2020-01-05 RX ADMIN — GABAPENTIN 300 MG: 300 CAPSULE ORAL at 23:13

## 2020-01-05 RX ADMIN — NIFEDIPINE 90 MG: 30 TABLET, FILM COATED, EXTENDED RELEASE ORAL at 09:00

## 2020-01-05 RX ADMIN — GABAPENTIN 300 MG: 300 CAPSULE ORAL at 08:32

## 2020-01-05 RX ADMIN — LOSARTAN POTASSIUM 50 MG: 50 TABLET, FILM COATED ORAL at 08:33

## 2020-01-05 RX ADMIN — INSULIN LISPRO 3 UNITS: 100 INJECTION, SOLUTION INTRAVENOUS; SUBCUTANEOUS at 08:33

## 2020-01-05 RX ADMIN — METHYLPREDNISOLONE SODIUM SUCCINATE 80 MG: 40 INJECTION, POWDER, FOR SOLUTION INTRAMUSCULAR; INTRAVENOUS at 23:14

## 2020-01-05 RX ADMIN — INSULIN GLARGINE 30 UNITS: 100 INJECTION, SOLUTION SUBCUTANEOUS at 23:15

## 2020-01-05 NOTE — PROGRESS NOTES
Bedside and Verbal shift change report given to Michael Saucedo RN (oncoming nurse) by [facility], RN  (offgoing nurse). Report included the following information SBAR, Kardex, Intake/Output, MAR, Recent Results and Med Rec Status.

## 2020-01-05 NOTE — PROGRESS NOTES
Bedside and Verbal shift change report given to Mel Villalta (oncoming nurse) by Alan Sorto (offgoing nurse). Report included the following information SBAR, Kardex, Intake/Output, MAR, Accordion and Recent Results.

## 2020-01-05 NOTE — PROGRESS NOTES
Hospitalist Progress Note    NAME: Wing Acevedo   :  1960   MRN:  [id number]       Assessment / Plan:  Acute polyarthritis  -appreciate rheumatology consult of Dr Felton Head  -steroids switched to IV at increased dose  -Rt knee arthrocentesis w minimal fluid, no crystals, no growth so far  -gout thought less likely, vs RA or ankylosing spondylitis  -lab workup unerway, rf/ccp/hlab27/joanna/IF pending  -uric acid 7.5 12.30, CRP > 10  -hasn't been able to tolerate allopurinol or other urate lower meds so far  -failed NSAIDs outpt w indomethacin  -pain improved with higher dose of steroids  -PT/OT  -ortho following    CKD 3  -Cr appears at baseline  -urate nephropathy or DM nephropathy may be contributing     HTN  -BP elevated likely due to pain  -Continue losartan amlodipine  -hydralazine prn     T2DM  -A1C 6.9  -hold metfomin  -SSI     Obesity    Body mass index is 38.41 kg/m².     Code status: Full  Prophylaxis: Lovenox  Recommended Disposition: Home w/Family     Subjective:     Chief Complaint / Reason for Physician Visit  Continues to improve with IV steroids. Still some trouble weighting Rt leg    Discussed with RN events overnight. Review of Systems:  Symptom Y/N Comments  Symptom Y/N Comments   Fever/Chills n   Chest Pain n    Poor Appetite    Edema     Cough n   Abdominal Pain n    Sputum n   Joint Pain y improved   SOB/CASSIDY n   Pruritis/Rash     Nausea/vomit n   Tolerating PT/OT     Diarrhea    Tolerating Diet y    Constipation    Other       Could NOT obtain due to:      Objective:     VITALS:   Last 24hrs VS reviewed since prior progress note.  Most recent are:  Patient Vitals for the past 24 hrs:   Temp Pulse Resp BP SpO2   20 2159 98.2 °F (36.8 °C) 85 16 137/76 95 %   20 1454 98 °F (36.7 °C) 83 18 160/76 97 %   20 1059 98.7 °F (37.1 °C) 65  139/81 96 %   20 0911  77  151/76 94 %   20 0800 97.6 °F (36.4 °C) 87  157/84 96 %   20 0008    141/80  Intake/Output Summary (Last 24 hours) at 1/4/2020 4628  Last data filed at 1/4/2020 2003  Gross per 24 hour   Intake    Output 1800 ml   Net -1800 ml        PHYSICAL EXAM:  General: WD, WN. Alert, cooperative, no acute distress    EENT:  EOMI. Anicteric sclerae. MMM  Resp:  CTA bilaterally, no wheezing or rales. No accessory muscle use  CV:  Regular  rhythm,  No edema  GI:  Soft, Non distended, Non tender.  +Bowel sounds  Neurologic:  Alert and oriented X 3, normal speech,   Psych:   Good insight. Not anxious nor agitated  Skin:  No rashes. No jaundice    Reviewed most current lab test results and cultures  YES  Reviewed most current radiology test results   YES  Review and summation of old records today    NO  Reviewed patient's current orders and MAR    YES  PMH/SH reviewed - no change compared to H&P  ________________________________________________________________________  Care Plan discussed with:    Comments   Patient x    Family      RN x    Care Manager     Consultant                        Multidiciplinary team rounds were held today with , nursing, pharmacist and clinical coordinator. Patient's plan of care was discussed; medications were reviewed and discharge planning was addressed. ________________________________________________________________________  Total NON critical care TIME:  30   Minutes    Total CRITICAL CARE TIME Spent:   Minutes non procedure based      Comments   >50% of visit spent in counseling and coordination of care x    ________________________________________________________________________  Poonam Sk[place], DO     Procedures: see electronic medical records for all procedures/Xrays and details which were not copied into this note but were reviewed prior to creation of Plan. LABS:  I reviewed today's most current labs and imaging studies.   Pertinent labs include:  Recent Labs     01/04/20  0502 01/02/20  0231   WBC 18.1* 13.0*   HGB 13.4 13.1 HCT 41.5 41.1    301     Recent Labs     01/04/20  0502 01/02/20  0231    139   K 4.0 3.8    103   CO2 29 32   * 163*   BUN 22* 18   CREA 1.21 1.27   CA 9.8 9.1       Signed: Adis Patel DO

## 2020-01-05 NOTE — PROGRESS NOTES
Second telemed hospitalist notification sent regarding blood sugar of 394. Awaiting orders. 0107-follow up blood sugar 407. Third telemed hospitalist notification sent.  Awaiting response

## 2020-01-06 LAB
B BURGDOR IGG+IGM SER-ACNC: <0.91 ISR (ref 0–0.9)
GLUCOSE BLD STRIP.AUTO-MCNC: 203 MG/DL (ref 65–100)
GLUCOSE BLD STRIP.AUTO-MCNC: 305 MG/DL (ref 65–100)
GLUCOSE BLD STRIP.AUTO-MCNC: 317 MG/DL (ref 65–100)
GLUCOSE BLD STRIP.AUTO-MCNC: 415 MG/DL (ref 65–100)
SERVICE CMNT-IMP: ABNORMAL

## 2020-01-06 PROCEDURE — 74011636637 HC RX REV CODE- 636/637: Performed by: INTERNAL MEDICINE

## 2020-01-06 PROCEDURE — 82962 GLUCOSE BLOOD TEST: CPT

## 2020-01-06 PROCEDURE — 74011250637 HC RX REV CODE- 250/637: Performed by: INTERNAL MEDICINE

## 2020-01-06 PROCEDURE — 97530 THERAPEUTIC ACTIVITIES: CPT

## 2020-01-06 PROCEDURE — 97116 GAIT TRAINING THERAPY: CPT

## 2020-01-06 PROCEDURE — 74011250636 HC RX REV CODE- 250/636: Performed by: INTERNAL MEDICINE

## 2020-01-06 PROCEDURE — 74011250637 HC RX REV CODE- 250/637: Performed by: HOSPITALIST

## 2020-01-06 PROCEDURE — 97535 SELF CARE MNGMENT TRAINING: CPT | Performed by: OCCUPATIONAL THERAPIST

## 2020-01-06 PROCEDURE — 65270000029 HC RM PRIVATE

## 2020-01-06 RX ORDER — NIFEDIPINE 90 MG/1
90 TABLET, EXTENDED RELEASE ORAL DAILY
Status: DISCONTINUED | OUTPATIENT
Start: 2020-01-07 | End: 2020-01-11 | Stop reason: HOSPADM

## 2020-01-06 RX ORDER — PREDNISONE 20 MG/1
60 TABLET ORAL
Status: DISCONTINUED | OUTPATIENT
Start: 2020-01-07 | End: 2020-01-07

## 2020-01-06 RX ADMIN — INSULIN LISPRO 7 UNITS: 100 INJECTION, SOLUTION INTRAVENOUS; SUBCUTANEOUS at 17:36

## 2020-01-06 RX ADMIN — MORPHINE SULFATE 1 MG: 2 INJECTION, SOLUTION INTRAMUSCULAR; INTRAVENOUS at 08:44

## 2020-01-06 RX ADMIN — Medication 10 ML: at 05:53

## 2020-01-06 RX ADMIN — COLCHICINE 0.6 MG: 0.6 TABLET, FILM COATED ORAL at 08:43

## 2020-01-06 RX ADMIN — INSULIN LISPRO 3 UNITS: 100 INJECTION, SOLUTION INTRAVENOUS; SUBCUTANEOUS at 08:40

## 2020-01-06 RX ADMIN — HUMAN INSULIN 70 UNITS: 100 INJECTION, SUSPENSION SUBCUTANEOUS at 08:41

## 2020-01-06 RX ADMIN — ENOXAPARIN SODIUM 40 MG: 40 INJECTION SUBCUTANEOUS at 21:57

## 2020-01-06 RX ADMIN — MORPHINE SULFATE 1 MG: 2 INJECTION, SOLUTION INTRAMUSCULAR; INTRAVENOUS at 17:40

## 2020-01-06 RX ADMIN — DOCUSATE SODIUM 100 MG: 100 CAPSULE, LIQUID FILLED ORAL at 08:37

## 2020-01-06 RX ADMIN — DOCUSATE SODIUM 100 MG: 100 CAPSULE, LIQUID FILLED ORAL at 17:36

## 2020-01-06 RX ADMIN — METHYLPREDNISOLONE SODIUM SUCCINATE 80 MG: 40 INJECTION, POWDER, FOR SOLUTION INTRAMUSCULAR; INTRAVENOUS at 08:40

## 2020-01-06 RX ADMIN — GABAPENTIN 300 MG: 300 CAPSULE ORAL at 17:36

## 2020-01-06 RX ADMIN — ASPIRIN 81 MG CHEWABLE TABLET 81 MG: 81 TABLET CHEWABLE at 08:38

## 2020-01-06 RX ADMIN — LOSARTAN POTASSIUM 50 MG: 50 TABLET, FILM COATED ORAL at 08:37

## 2020-01-06 RX ADMIN — Medication 10 ML: at 17:36

## 2020-01-06 RX ADMIN — ATORVASTATIN CALCIUM 10 MG: 10 TABLET, FILM COATED ORAL at 08:38

## 2020-01-06 RX ADMIN — TRAMADOL HYDROCHLORIDE 50 MG: 50 TABLET, FILM COATED ORAL at 21:57

## 2020-01-06 RX ADMIN — TAMSULOSIN HYDROCHLORIDE 0.4 MG: 0.4 CAPSULE ORAL at 08:38

## 2020-01-06 RX ADMIN — Medication 10 ML: at 22:01

## 2020-01-06 RX ADMIN — NIFEDIPINE 90 MG: 30 TABLET, FILM COATED, EXTENDED RELEASE ORAL at 09:00

## 2020-01-06 RX ADMIN — INSULIN LISPRO 7 UNITS: 100 INJECTION, SOLUTION INTRAVENOUS; SUBCUTANEOUS at 12:12

## 2020-01-06 RX ADMIN — GABAPENTIN 300 MG: 300 CAPSULE ORAL at 21:57

## 2020-01-06 RX ADMIN — INSULIN GLARGINE 30 UNITS: 100 INJECTION, SOLUTION SUBCUTANEOUS at 21:57

## 2020-01-06 RX ADMIN — AMLODIPINE BESYLATE 10 MG: 5 TABLET ORAL at 08:36

## 2020-01-06 RX ADMIN — TRAMADOL HYDROCHLORIDE 50 MG: 50 TABLET, FILM COATED ORAL at 12:13

## 2020-01-06 NOTE — PROGRESS NOTES
Hospitalist Progress Note    NAME: Roshni Jj   :  1960   MRN:  [id number]       Assessment / Plan:  Acute polyarthritis  -appreciate rheumatology consult of Dr Desire Simental  -cont IV steroids  -Rt knee arthrocentesis w minimal fluid, no crystals, no growth so far  -gout thought less likely, vs RA or ankylosing spondylitis  -lab workup unerway, rf/ccp/hlab27/joanna/IF pending  -uric acid 7.5 12.30, CRP > 10  -hasn't been able to tolerate allopurinol or other urate lower meds so far  -failed NSAIDs outpt w indomethacin  -pain improved with higher dose of steroids  -PT/OT, inpatient rehab on DC?  -ortho following    CKD 3  -Cr appears at baseline  -urate nephropathy or DM nephropathy may be contributing     HTN  -BP elevated likely due to pain  -Continue losartan amlodipine  -hydralazine prn     T2DM  -A1C 6.9  -hold metfomin  -SSI     Obesity    Body mass index is 38.41 kg/m².     Code status: Full  Prophylaxis: Lovenox  Recommended Disposition: Home w/Family     Subjective:     Chief Complaint / Reason for Physician Visit  No new complaints. Rt knee and wrist pain improved    Discussed with RN events overnight. Review of Systems:  Symptom Y/N Comments  Symptom Y/N Comments   Fever/Chills n   Chest Pain n    Poor Appetite    Edema     Cough n   Abdominal Pain n    Sputum n   Joint Pain y improved   SOB/CASSIDY n   Pruritis/Rash     Nausea/vomit n   Tolerating PT/OT     Diarrhea    Tolerating Diet y    Constipation    Other       Could NOT obtain due to:      Objective:     VITALS:   Last 24hrs VS reviewed since prior progress note.  Most recent are:  Patient Vitals for the past 24 hrs:   Temp Pulse Resp BP SpO2   20 2220 97.8 °F (36.6 °C) 74 16 141/75 97 %   20 1538 98.1 °F (36.7 °C) 84 20 140/81 94 %   20 1246 98 °F (36.7 °C) 85 18 131/62 97 %   20 0803 98.4 °F (36.9 °C) 71 18 132/86 97 %   20 0623 98.4 °F (36.9 °C) 72 16 158/83 96 %       Intake/Output Summary (Last 24 hours) at 1/5/2020 2230  Last data filed at 1/5/2020 1744  Gross per 24 hour   Intake    Output 5025 ml   Net -5025 ml        PHYSICAL EXAM:  General: WD, WN. Alert, cooperative, no acute distress    EENT:  EOMI. Anicteric sclerae. MMM  Resp:  CTA bilaterally, no wheezing or rales. No accessory muscle use  CV:  Regular  rhythm,  No edema  GI:  Soft, Non distended, Non tender.  +Bowel sounds  Neurologic:  Alert and oriented X 3, normal speech,   Psych:   Good insight. Not anxious nor agitated  Skin:  No rashes. No jaundice    Reviewed most current lab test results and cultures  YES  Reviewed most current radiology test results   YES  Review and summation of old records today    NO  Reviewed patient's current orders and MAR    YES  PMH/SH reviewed - no change compared to H&P  ________________________________________________________________________  Care Plan discussed with:    Comments   Patient x    Family      RN x    Care Manager     Consultant                        Multidiciplinary team rounds were held today with , nursing, pharmacist and clinical coordinator. Patient's plan of care was discussed; medications were reviewed and discharge planning was addressed. ________________________________________________________________________  Total NON critical care TIME:  30   Minutes    Total CRITICAL CARE TIME Spent:   Minutes non procedure based      Comments   >50% of visit spent in counseling and coordination of care x    ________________________________________________________________________  Samuel Brown DO     Procedures: see electronic medical records for all procedures/Xrays and details which were not copied into this note but were reviewed prior to creation of Plan. LABS:  I reviewed today's most current labs and imaging studies.   Pertinent labs include:  Recent Labs     01/04/20  0502   WBC 18.1*   HGB 13.4   HCT 41.5        Recent Labs     01/04/20  0502      K 4.0    CO2 29   *   BUN 22*   CREA 1.21   CA 9.8       Signed: Silvia Cancino, DO

## 2020-01-06 NOTE — PROGRESS NOTES
Problem: Self Care Deficits Care Plan (Adult)  Goal: *Acute Goals and Plan of Care (Insert Text)  Description      FUNCTIONAL STATUS PRIOR TO ADMISSION: Patient was independent and active without use of DME.    HOME SUPPORT: The patient lived with wife. Occupational Therapy Goals  Initiated 1/3/2020  1. Patient will perform grooming standing at sink with supervision/set-up within 7 day(s). 2.  Patient will perform upper body dressing with supervision/set-up within 7 day(s). 3.  Patient will perform lower body dressing with supervision/set-up within 7 day(s). 4.  Patient will perform toilet transfers with supervision/set-up within 7 day(s). 5.  Patient will perform all aspects of toileting with supervision/set-up within 7 day(s). 6.  Patient will perform sponge bathing with supervision/set-up within 7 day(s). Outcome: Progressing Towards Goal    OCCUPATIONAL THERAPY TREATMENT  Patient: Zaid Post (15 y.o. male)  Date: 1/6/2020  Diagnosis: Acute infective polyarthritis ([facility] [address] [M00.9]  Gout attack [M10.9]  Gout flare [M10.9]  Acute gouty arthritis [M10.9]   <principal problem not specified>       Precautions: Fall  Chart, occupational therapy assessment, plan of care, and goals were reviewed. ASSESSMENT  Steady functional progress, with improving R knee, ankle and wrist pain control. No further c/o R elbow pain, but patient still unable to tolerate weight bearing on his R wrist and RLE. Based on his performance today he is performing most ADLs and functional mobility at a CGA level, using compensatory techniques for ADLs and ambulating with a RW with R platform. Good overall safety awareness noted t/o session and patient demonstrating motivation to regain his independent baseline level of function. Patient continues to benefit from acute OT and will need inpatient rehab after discharge. PLAN :  Patient continues to benefit from skilled intervention to address the above impairments. Continue treatment per established plan of care. to address goals. Recommendation for discharge: (in order for the patient to meet his/her long term goals)  Therapy 3 hours per day 5-7 days per week      Equipment recommendations for successful discharge (if) home:[facility]          OBJECTIVE DATA SUMMARY:   Cognitive/Behavioral Status:  Neurologic State: Alert  Orientation Level: Oriented X4  Cognition: Appropriate decision making; Appropriate for age attention/concentration; Appropriate safety awareness        Safety/Judgement: Awareness of environment; Insight into deficits;Good awareness of safety precautions    Functional Mobility and Transfers for ADLs:  Bed Mobility:  Rolling: Supervision  Supine to Sit: Supervision  Scooting: Supervision    Transfers:  Sit to Stand: Contact guard assistance  Functional Transfers  Bathroom Mobility: Contact guard assistance  Toilet Transfer : Contact guard assistance  Cues: Verbal cues provided  Adaptive Equipment: Grab bars  Bed to Chair: Contact guard assistance    Balance:  Sitting: Intact; Without support  Sitting - Static: Good (unsupported)  Sitting - Dynamic: Not tested  Standing: Impaired  Standing - Static: Good;Constant support  Standing - Dynamic : Good;Constant support    ADL Intervention:  Grooming  Washing Hands: Contact guard assistance(standing, unable to tolerate full weight on RLE)    Lower Body Dressing Assistance  Socks: Supervision;Set-up; Compensatory technique training  Position Performed: Seated in chair;Bending forward method  Cues: Verbal cues provided    Toileting  Toileting Assistance: Contact guard assistance  Bladder Hygiene: Independent  Clothing Management: Contact guard assistance  Adaptive Equipment: Grab bars    Cognitive Retraining  Safety/Judgement: Awareness of environment; Insight into deficits;Good awareness of safety precautions      Pain:  Continued, but improving R ankle, knee and wrist pain.  Unable to tolerate WB in RLE or R wrist.     Activity Tolerance:   Good  Please refer to the flowsheet for vital signs taken during this treatment.     After treatment patient left in no apparent distress:   Sitting in chair and Call bell within reach    COMMUNICATION/COLLABORATION:   The patients plan of care was discussed with: Physical Therapy Assistant and     Ayush Carbajal, OTR/L  Time Calculation: 15 mins

## 2020-01-06 NOTE — PROGRESS NOTES
Hospitalist Progress Note    NAME: Carmenza Hussein   :  1960   MRN:  [id number]       Assessment / Plan:  Acute polyarthritis  -Rt knee arthrocentesis w minimal fluid, no crystals, no growth so far  -gout thought less likely, vs RA or ankylosing spondylitis  -lab workup underway, rf/ccp/hlab27/joanna/IF pending  -uric acid 7.5 12.30, CRP > 10  -hasn't been able to tolerate allopurinol or other urate lower meds so far  -failed NSAIDs outpt w indomethacin  -pain improved with higher dose of IV steroids. Transitioning to PO prednisone in AM  -PT/OT, inpatient rehab - waiting on insurance auth  --appreciate rheumatology consult of Dr Jackie Brown; follow up as OP    CKD 3  -Cr appears at baseline     HTN  -BP elevated likely due to pain  -Continue losartan amlodipine  -hydralazine prn     T2DM  -A1C 6.9  -hold metfomin  -will need basal lantus with premeal Humalog while on steroids  -SSI     Obesity    Body mass index is 38.41 kg/m².     Code status: Full  Prophylaxis: Lovenox  Recommended Disposition: Home w/Family     Subjective:     Chief Complaint / Reason for Physician Visit  Follow up polyarthritis, DM, HTN  Discussed with RN events overnight. Review of Systems:  Symptom Y/N Comments  Symptom Y/N Comments   Fever/Chills n   Chest Pain n    Poor Appetite    Edema     Cough n   Abdominal Pain n    Sputum n   Joint Pain y improved   SOB/CASSIDY n   Pruritis/Rash     Nausea/vomit n   Tolerating PT/OT     Diarrhea    Tolerating Diet y    Constipation    Other       Could NOT obtain due to:      Objective:     VITALS:   Last 24hrs VS reviewed since prior progress note.  Most recent are:  Patient Vitals for the past 24 hrs:   Temp Pulse Resp BP SpO2   20 0818 97.1 °F (36.2 °C) 88 18 150/71 96 %   20 0553 98.2 °F (36.8 °C) 67 16 133/87 96 %   20 2220 97.8 °F (36.6 °C) 74 16 141/75 97 %   20 1538 98.1 °F (36.7 °C) 84 20 140/81 94 %   20 1246 98 °F (36.7 °C) 85 18 131/62 97 % Intake/Output Summary (Last 24 hours) at 1/6/2020 1226  Last data filed at 1/6/2020 0840  Gross per 24 hour   Intake    Output 2300 ml   Net -2300 ml        PHYSICAL EXAM:  General: WD, WN. Alert, cooperative, no acute distress    EENT:  EOMI. Anicteric sclerae. MMM  Resp:  CTA bilaterally, no wheezing or rales. No accessory muscle use  CV:  Regular  rhythm,  No edema  GI:  Soft, Non distended, Non tender.  +Bowel sounds  Neurologic:  Alert and oriented X 3, normal speech,   Psych:   Good insight. Not anxious nor agitated  Skin:  No rashes. No jaundice    Reviewed most current lab test results and cultures  YES  Reviewed most current radiology test results   YES  Review and summation of old records today    NO  Reviewed patient's current orders and MAR    YES  PMH/ reviewed - no change compared to H&P  ________________________________________________________________________  Care Plan discussed with:    Comments   Patient x    Family      RN x    Care Manager     Consultant                        Multidiciplinary team rounds were held today with , nursing, pharmacist and clinical coordinator. Patient's plan of care was discussed; medications were reviewed and discharge planning was addressed. ________________________________________________________________________  Total NON critical care TIME:  30   Minutes    Total CRITICAL CARE TIME Spent:   Minutes non procedure based      Comments   >50% of visit spent in counseling and coordination of care x    ________________________________________________________________________  Theodore Aguayo MD     Procedures: see electronic medical records for all procedures/Xrays and details which were not copied into this note but were reviewed prior to creation of Plan. LABS:  I reviewed today's most current labs and imaging studies.   Pertinent labs include:  Recent Labs     01/04/20  0502   WBC 18.1*   HGB 13.4   HCT 41.5        Recent Labs 01/04/20  0502      K 4.0      CO2 29   *   BUN 22*   CREA 1.21   CA 9.8       Signed: Andreina Cabrera MD

## 2020-01-06 NOTE — PROGRESS NOTES
Spiritual Care Partner Volunteer visited patient in Ortho on January 6, 2020. Luisa Po   Documented by:     MANDY Banegas, [address], Staff [address] [place] [address] [employer]  [phone]

## 2020-01-06 NOTE — PROGRESS NOTES
Bedside and Verbal shift change report given to Raji Rocha (oncoming nurse) by Du Mota  (offgoing nurse). Report included the following information SBAR, Kardex, Intake/Output, MAR, Accordion and Recent Results.

## 2020-01-06 NOTE — PROGRESS NOTES
PAULINA: [facility]- accepted  1) insurance auth to be obtained    CM acknowledged that [facility] has accepted patient for rehab. CM spoke to [facility] liaison, Dre Gonsales, who reported that they need PT/OT to see patient and place note into chart today so that they can start insurance auth. CM acknowledged that PT note was recently placed this morning. CM requested OT to see patient as soon as possible as patient needs insurance auth.      Carlos Ramos, [address] [facility] [address] [facility] [address]

## 2020-01-06 NOTE — PROGRESS NOTES
Bedside shift change report given to Charles Phillips (oncoming nurse) by Bianca Beth (offgoing nurse). Report included the following information SBAR.

## 2020-01-06 NOTE — PROGRESS NOTES
Problem: Mobility Impaired (Adult and Pediatric)  Goal: *Acute Goals and Plan of Care (Insert Text)  Description  FUNCTIONAL STATUS PRIOR TO ADMISSION: Patient was independent and active without use of DME. Has been bed bound for the past 3 weeks secondary to R knee pain, R ankle and R elbow. Was previously able to ambulate with crutches when pain in knee first began. HOME SUPPORT PRIOR TO ADMISSION: The patient lived with wife but did not require assist prior to issue. Family assisted him after he was unable to tolerate walking but they work during the day. Physical Therapy Goals  Initiated 1/2/2020  1. Patient will move from supine to sit and sit to supine  in bed with modified independence within 7 day(s). 2.  Patient will transfer from bed to chair and chair to bed with modified independence using the least restrictive device within 7 day(s). 3.  Patient will perform sit to stand with modified independence within 7 day(s). 4.  Patient will ambulate with modified independence for 20 feet with the least restrictive device within 7 day(s). Note:   PHYSICAL THERAPY TREATMENT  Patient: Marly Marcus (38 y.o. male)  Date: 1/6/2020  Diagnosis: Acute infective polyarthritis ([address] [M00.9]        Gout attack [M10.9]        Gout flare [M10.9]        Acute gouty arthritis [M10.9]        Precautions: Fall  Chart, physical therapy assessment, plan of care and goals were reviewed. ASSESSMENT  Patient continues with skilled PT services and is progressing towards goals, no LOB or SOB, fatigued post gait trng, does well with bed mob and transfers, good motivation, did well with platform RW, good in-pt candidate, vc's for safety and proper RW use. Current Level of Function Impacting Discharge (mobility/balance): CGA x1         PLAN :  Patient continues to benefit from skilled intervention to address the above impairments. Continue treatment per established plan of care.   to address goals. Recommendation for discharge: (in order for the patient to meet his/her long term goals)  Therapy 3 hours per day 5-7 days per week    This discharge recommendation:  Has been made in collaboration with the attending provider and/or case management    IF patient discharges home will need the following DME: to be determined (TBD)     OBJECTIVE DATA SUMMARY:   Critical Behavior:    Neurologic State: Alert  Orientation Level: Oriented X4  Cognition: Appropriate decision making  Safety/Judgement: Awareness of environment, Insight into deficits, Good awareness of safety precautions    Functional Mobility Training:  Bed Mobility:  Rolling: Supervision  Supine to Sit: Supervision  Scooting: Supervision  Level of Assistance: Supervision  Interventions: Verbal cues    Transfers:  Sit to Stand: Stand-by assistance  Stand to Sit: Stand-by assistance  Bed to Chair: Contact guard assistance  Interventions: Tactile cues; Verbal cues  Level of Assistance: Contact guard assistance    Balance:  Sitting: Intact; Without support  Sitting - Static: Good (unsupported)  Sitting - Dynamic: Not tested  Standing: Intact; With support  Standing - Static: Good;Constant support  Standing - Dynamic : Good;Constant support    Ambulation/Gait Training:  Distance (ft): 25 Feet (ft)  Assistive Device: Walker, rolling with forearm support; Gait belt  Ambulation - Level of Assistance: Contact guard assistance;Assist x1;Additional time  Gait Abnormalities: Antalgic;Decreased step clearance; Step to gait  Right Side Weight Bearing: As tolerated  Left Side Weight Bearing: Full  Base of Support: Shift to left;Narrowed  Stance: Left increased  Speed/Patricia: Slow  Step Length: Left shortened    Pain Rating: see flow sheet    Activity Tolerance: fair and requires rest breaks    After treatment patient left in no apparent distress: Sitting in chair and Call bell within reach    COMMUNICATION/COLLABORATION:   The patients plan of care was discussed with: Registered Nurse    Shaun Avina, PTA   Time Calculation: 25 mins

## 2020-01-07 ENCOUNTER — APPOINTMENT (OUTPATIENT)
Dept: CT IMAGING | Age: 60
DRG: 554 | End: 2020-01-07
Attending: INTERNAL MEDICINE
Payer: MEDICARE

## 2020-01-07 ENCOUNTER — APPOINTMENT (OUTPATIENT)
Dept: GENERAL RADIOLOGY | Age: 60
DRG: 554 | End: 2020-01-07
Attending: INTERNAL MEDICINE
Payer: MEDICARE

## 2020-01-07 LAB
CREAT UR-MCNC: 85.6 MG/DL
D DIMER PPP FEU-MCNC: 0.98 MG/L FEU (ref 0–0.65)
GLUCOSE BLD STRIP.AUTO-MCNC: 102 MG/DL (ref 65–100)
GLUCOSE BLD STRIP.AUTO-MCNC: 240 MG/DL (ref 65–100)
GLUCOSE BLD STRIP.AUTO-MCNC: 331 MG/DL (ref 65–100)
GLUCOSE BLD STRIP.AUTO-MCNC: 452 MG/DL (ref 65–100)
GLUCOSE BLD STRIP.AUTO-MCNC: 79 MG/DL (ref 65–100)
HLA-B27 QL NAA+PROBE: NEGATIVE
PROT UR-MCNC: 10 MG/DL (ref 0–11.9)
PROT/CREAT UR-RTO: 0.1
SERVICE CMNT-IMP: ABNORMAL
SERVICE CMNT-IMP: NORMAL
TROPONIN I SERPL-MCNC: <0.05 NG/ML

## 2020-01-07 PROCEDURE — 74011250636 HC RX REV CODE- 250/636: Performed by: INTERNAL MEDICINE

## 2020-01-07 PROCEDURE — 74011636637 HC RX REV CODE- 636/637: Performed by: INTERNAL MEDICINE

## 2020-01-07 PROCEDURE — 65270000029 HC RM PRIVATE

## 2020-01-07 PROCEDURE — 93005 ELECTROCARDIOGRAM TRACING: CPT

## 2020-01-07 PROCEDURE — 71275 CT ANGIOGRAPHY CHEST: CPT

## 2020-01-07 PROCEDURE — 86060 ANTISTREPTOLYSIN O TITER: CPT

## 2020-01-07 PROCEDURE — 74011250637 HC RX REV CODE- 250/637: Performed by: INTERNAL MEDICINE

## 2020-01-07 PROCEDURE — 82962 GLUCOSE BLOOD TEST: CPT

## 2020-01-07 PROCEDURE — 71046 X-RAY EXAM CHEST 2 VIEWS: CPT

## 2020-01-07 PROCEDURE — 86038 ANTINUCLEAR ANTIBODIES: CPT

## 2020-01-07 PROCEDURE — 85379 FIBRIN DEGRADATION QUANT: CPT

## 2020-01-07 PROCEDURE — 74011636637 HC RX REV CODE- 636/637: Performed by: HOSPITALIST

## 2020-01-07 PROCEDURE — 84156 ASSAY OF PROTEIN URINE: CPT

## 2020-01-07 PROCEDURE — 74011250637 HC RX REV CODE- 250/637: Performed by: HOSPITALIST

## 2020-01-07 PROCEDURE — 84484 ASSAY OF TROPONIN QUANT: CPT

## 2020-01-07 PROCEDURE — 36415 COLL VENOUS BLD VENIPUNCTURE: CPT

## 2020-01-07 PROCEDURE — 70450 CT HEAD/BRAIN W/O DYE: CPT

## 2020-01-07 RX ORDER — INSULIN LISPRO 100 [IU]/ML
15 INJECTION, SOLUTION INTRAVENOUS; SUBCUTANEOUS ONCE
Status: COMPLETED | OUTPATIENT
Start: 2020-01-07 | End: 2020-01-07

## 2020-01-07 RX ORDER — METFORMIN HYDROCHLORIDE 500 MG/1
1000 TABLET ORAL 2 TIMES DAILY WITH MEALS
Status: DISCONTINUED | OUTPATIENT
Start: 2020-01-07 | End: 2020-01-11 | Stop reason: HOSPADM

## 2020-01-07 RX ORDER — PREDNISONE 20 MG/1
80 TABLET ORAL
Status: DISCONTINUED | OUTPATIENT
Start: 2020-01-08 | End: 2020-01-07

## 2020-01-07 RX ORDER — INSULIN LISPRO 100 [IU]/ML
10 INJECTION, SOLUTION INTRAVENOUS; SUBCUTANEOUS ONCE
Status: COMPLETED | OUTPATIENT
Start: 2020-01-07 | End: 2020-01-07

## 2020-01-07 RX ORDER — SODIUM CHLORIDE 0.9 % (FLUSH) 0.9 %
10 SYRINGE (ML) INJECTION
Status: COMPLETED | OUTPATIENT
Start: 2020-01-07 | End: 2020-01-07

## 2020-01-07 RX ORDER — INSULIN GLARGINE 100 [IU]/ML
20 INJECTION, SOLUTION SUBCUTANEOUS
Status: DISCONTINUED | OUTPATIENT
Start: 2020-01-07 | End: 2020-01-08

## 2020-01-07 RX ADMIN — METFORMIN HYDROCHLORIDE 1000 MG: 500 TABLET ORAL at 17:36

## 2020-01-07 RX ADMIN — TRAZODONE HYDROCHLORIDE 50 MG: 50 TABLET ORAL at 23:56

## 2020-01-07 RX ADMIN — METHYLPREDNISOLONE SODIUM SUCCINATE 125 MG: 125 INJECTION, POWDER, FOR SOLUTION INTRAMUSCULAR; INTRAVENOUS at 11:45

## 2020-01-07 RX ADMIN — Medication 10 ML: at 22:35

## 2020-01-07 RX ADMIN — INSULIN GLARGINE 20 UNITS: 100 INJECTION, SOLUTION SUBCUTANEOUS at 22:34

## 2020-01-07 RX ADMIN — ASPIRIN 81 MG CHEWABLE TABLET 81 MG: 81 TABLET CHEWABLE at 09:00

## 2020-01-07 RX ADMIN — Medication 10 ML: at 17:38

## 2020-01-07 RX ADMIN — GABAPENTIN 300 MG: 300 CAPSULE ORAL at 09:27

## 2020-01-07 RX ADMIN — INSULIN LISPRO 10 UNITS: 100 INJECTION, SOLUTION INTRAVENOUS; SUBCUTANEOUS at 01:00

## 2020-01-07 RX ADMIN — ENOXAPARIN SODIUM 40 MG: 40 INJECTION SUBCUTANEOUS at 22:34

## 2020-01-07 RX ADMIN — DOCUSATE SODIUM 100 MG: 100 CAPSULE, LIQUID FILLED ORAL at 09:27

## 2020-01-07 RX ADMIN — INSULIN LISPRO 7 UNITS: 100 INJECTION, SOLUTION INTRAVENOUS; SUBCUTANEOUS at 18:25

## 2020-01-07 RX ADMIN — TAMSULOSIN HYDROCHLORIDE 0.4 MG: 0.4 CAPSULE ORAL at 09:26

## 2020-01-07 RX ADMIN — METFORMIN HYDROCHLORIDE 1000 MG: 500 TABLET ORAL at 11:42

## 2020-01-07 RX ADMIN — ATORVASTATIN CALCIUM 10 MG: 10 TABLET, FILM COATED ORAL at 09:27

## 2020-01-07 RX ADMIN — INSULIN LISPRO 3 UNITS: 100 INJECTION, SOLUTION INTRAVENOUS; SUBCUTANEOUS at 12:17

## 2020-01-07 RX ADMIN — NIFEDIPINE 90 MG: 90 TABLET, FILM COATED, EXTENDED RELEASE ORAL at 09:27

## 2020-01-07 RX ADMIN — COLCHICINE 0.6 MG: 0.6 TABLET, FILM COATED ORAL at 11:41

## 2020-01-07 RX ADMIN — TRAZODONE HYDROCHLORIDE 50 MG: 50 TABLET ORAL at 00:01

## 2020-01-07 RX ADMIN — LOSARTAN POTASSIUM 50 MG: 50 TABLET, FILM COATED ORAL at 09:27

## 2020-01-07 RX ADMIN — GABAPENTIN 300 MG: 300 CAPSULE ORAL at 22:35

## 2020-01-07 RX ADMIN — TRAMADOL HYDROCHLORIDE 50 MG: 50 TABLET, FILM COATED ORAL at 05:39

## 2020-01-07 RX ADMIN — MORPHINE SULFATE 1 MG: 2 INJECTION, SOLUTION INTRAMUSCULAR; INTRAVENOUS at 12:18

## 2020-01-07 RX ADMIN — INSULIN LISPRO 15 UNITS: 100 INJECTION, SOLUTION INTRAVENOUS; SUBCUTANEOUS at 22:35

## 2020-01-07 RX ADMIN — METHYLPREDNISOLONE SODIUM SUCCINATE 125 MG: 125 INJECTION, POWDER, FOR SOLUTION INTRAMUSCULAR; INTRAVENOUS at 22:36

## 2020-01-07 RX ADMIN — DOCUSATE SODIUM 100 MG: 100 CAPSULE, LIQUID FILLED ORAL at 17:37

## 2020-01-07 NOTE — PROGRESS NOTES
Hospitalist Progress Note    NAME: Crystal Hernandez   :  1960   MRN:  [id number]       Assessment / Plan:  Acute polyarthritis  -Rt knee arthrocentesis w minimal fluid, no crystals, culture NGTD  -gout thought less likely, vs RA or ankylosing spondylitis  -lab workup underway, HIV and Hep screen NR; lyme ab NEG; CCP ab NEG  -uric acid 7.5, CRP > 10  -hasn't been able to tolerate allopurinol or other urate lower meds   -failed NSAIDs outpt w indomethacin  -pain improved with high dose of IV steroids. Transitioning to PO today but he felt more achy this morning after stopping his IV steroids yesterday afternoon. He is 100kg so will start with 80mg daily for now  -PT/OT, inpatient rehab - waiting on insurance auth  --appreciate rheumatology consult of Dr Kenia Gilliland; follow up as OP    CKD 3  -Cr appears at baseline     HTN  -BP elevated likely due to pain  -Continue losartan and nifedipine. He is no longer on amlodipine  -hydralazine prn     T2DM  -A1C 6.9  -restart metfomin today  -had a near hypoglycemic episode (symptomatic) this AM so will decrease lantus to 20 units and monitor overnight. -SSI     Obesity /  Body mass index is 38.41 kg/m².     Code status: Full  Prophylaxis: Lovenox  Recommended Disposition: Home w/Family     Subjective:     Chief Complaint / Reason for Physician Visit  Follow up polyarthritis, DM, HTN  Discussed with RN events overnight. Review of Systems:  Symptom Y/N Comments  Symptom Y/N Comments   Fever/Chills n   Chest Pain n    Poor Appetite    Edema     Cough n   Abdominal Pain n    Sputum n   Joint Pain y improved   SOB/CASSIDY n   Pruritis/Rash     Nausea/vomit n   Tolerating PT/OT     Diarrhea    Tolerating Diet y    Constipation    Other       Could NOT obtain due to:      Objective:     VITALS:   Last 24hrs VS reviewed since prior progress note.  Most recent are:  Patient Vitals for the past 24 hrs:   Temp Pulse Resp BP SpO2   20 0301 98.4 °F (36.9 °C) 62 16 115/63 95 % 01/07/20 0045 97.7 °F (36.5 °C) 63 16 123/72 97 %   01/06/20 1938 98.5 °F (36.9 °C) 87 16 145/86 98 %   01/06/20 1130 98.4 °F (36.9 °C) 80 16 150/78 95 %       Intake/Output Summary (Last 24 hours) at 1/7/2020 0913  Last data filed at 1/7/2020 0540  Gross per 24 hour   Intake    Output 3200 ml   Net -3200 ml        PHYSICAL EXAM:  General: WD, WN. Alert, cooperative, no acute distress    EENT:  EOMI. Anicteric sclerae. MMM  Resp:  CTA bilaterally, no wheezing or rales. No accessory muscle use  CV:  Regular  rhythm,  No edema  GI:  Soft, Non distended, Non tender.  +Bowel sounds  Neurologic:  Alert and oriented X 3, normal speech,   Psych:   Good insight. Not anxious nor agitated  Skin:  No rashes. No jaundice    Reviewed most current lab test results and cultures  YES  Reviewed most current radiology test results   YES  Review and summation of old records today    NO  Reviewed patient's current orders and MAR    YES  PMH/SH reviewed - no change compared to H&P  ________________________________________________________________________  Care Plan discussed with:    Comments   Patient x    Family      RN x    Care Manager     Consultant                        Multidiciplinary team rounds were held today with , nursing, pharmacist and clinical coordinator. Patient's plan of care was discussed; medications were reviewed and discharge planning was addressed. ________________________________________________________________________  Total NON critical care TIME:  30   Minutes    Total CRITICAL CARE TIME Spent:   Minutes non procedure based      Comments   >50% of visit spent in counseling and coordination of care x    ________________________________________________________________________  Christian Steele MD     Procedures: see electronic medical records for all procedures/Xrays and details which were not copied into this note but were reviewed prior to creation of Plan.       LABS:  I reviewed today's most current labs and imaging studies. Pertinent labs include:  No results for input(s): WBC, HGB, HCT, PLT, HGBEXT, HCTEXT, PLTEXT, HGBEXT, HCTEXT, PLTEXT in the last 72 hours. No results for input(s): NA, K, CL, CO2, GLU, BUN, CREA, CA, MG, PHOS, ALB, TBIL, TBILI, SGOT, ALT, INR, INREXT, INREXT in the last 72 hours.     Signed: Chun Juan MD

## 2020-01-07 NOTE — PROGRESS NOTES
1518 responding to RTT code S. Upon arrival,  Pt. Reports experiencing discomfort on left lower flank extending to beneath left breast.  Pt. Also reports \"feeling like my lip was swelling on the inside and numb\". Pt. Points to left side of face. Awake and oriented,  speech clear,  POTTER prior hx. Of difficulty movement of right leg. Hx. Of polyarthritis. NIH 0. EKG in progress (MD notified earlier for c/o left chest discomfort). .Pt. Reports left flank pain \"was a \"9\". When it started, but a 4 now. \" Pt. Reports discomfort \"different than indigestion pain. \"FSBS 331 Currently denies numbness. No evidence of lip swelling. Rodrick Menendez and Tristan at bedside. 1530 up on bedside voiding. Dr. Lul Santos in. See MD orders for plan of care. 1811 Follow up RRT/Code S. Note results of CT of head and CTA chest.  Pt. Up in chair eating denies facial numbness or swelling left side of face. Denies left flank discomfort. Pt. Reports concern \"whether it was medication related, I took some pain medicines before I went down for CXR and it started when I got back\".   Concerns relay to nurse

## 2020-01-07 NOTE — PROGRESS NOTES
Bedside and Verbal shift change report given to Maria Isabel Kathleen (oncoming nurse) by Lesley (offgoing nurse). Report included the following information SBAR, Kardex, Procedure Summary, Intake/Output, MAR, Recent Results and Med Rec Status.

## 2020-01-07 NOTE — PROGRESS NOTES

## 2020-01-07 NOTE — CONSULTS
Spoke with patient  Joint pain and swelling improved some while on solumedrol, but sx worsened when he was switched to PO    Change back to solumedrol - try higher dose 125mg q8h IV for 1-2 days and can then try to transition to oral pred  Since he is not responding as well to high dose IV steroids as I would expect, we are going to look for other potential causes     So far his rf, ccp , lymbe WB are negative  hla b27 and REEMA IF are pending  Also ordered ASO titer  Ordering a CXR to look for any evidence of sarcoid

## 2020-01-07 NOTE — PROGRESS NOTES
@ 12 - Code S called by primary RN for facial numbness/feeling as thought one side of his face feels differently than the other. EKG order previously obtained for flank vs left breast pain by Primary RN. FSBS 331. VS as documented. @ 1550 - To CT with CCL and student RN on [facility]. @ 1609 - Head CT completed. To Radiology Holding for US guided IV (site and size of request by CT) by ED Tech for CTA of chest.  Patient remains stable. @ 1622 - PIV obtained by ED Tech using [address]. To CT for CTA when available. @ [phone] - Patient returned to room [phone] with CCL and student nurse on [facility]. Primary RN aware. In stable condition.

## 2020-01-07 NOTE — PROGRESS NOTES
Bedside and Verbal shift change report given to MANNY [place] (oncoming nurse) by Mark Saravia RN (offgoing nurse).  Report included the following information SBAR, Kardex, Intake/Output, MAR, Recent Results and Med Rec Status.

## 2020-01-07 NOTE — PROGRESS NOTES
Responding to rapid response team call for chest pains and code S    Initially called for CP but patient then mentioned left facial numbness as well. CXR clear  EKG NSR no ischemic STTW change. More of Left flank, left posterior shoulder pain  Pain does not appear muscular. Not tender and no change with ROM of shoulder joint. Not pleuritic and patient is on lovenox. Left facial numbness persistent but started this afternoon  No associated speech difficulty or arm / leg symptoms but complained of blurry vision    Patient may have a hypercoagulable state related to some underlying rheumatologic issue  Antiphospholipid syndrome? Chest pains  Left facial numbness, TIA vs CVA    Plan:  -D dimer and troponin  -CT head. Consult Neurology. Cont ASA  -CTA chest.       Sravanthi Jacobs MD  I have provided    45    minutes of critical care time. During this entire length of time I was immediately available to the patient. The reason for providing this level of medical care was due to a critical illness that impaired one or more vital organ systems, such that there was a high probability of imminent or life threatening deterioration in the patient's condition. This care involved high complexity decision making which includes reviewing the patient's past medical records, current laboratory results, and actual Xray films in order to assess, support vital system function, and to treat this degree of vital organ system failure, and to prevent further life threatening deterioration of the patients condition. I have also discussed this case with patient, wife and Neurologist, Dr Stanley Kumar.

## 2020-01-07 NOTE — PROGRESS NOTES
Patient complaining of left flank pain and chest pain. 153/93 P - 83. Notified Juliet Russ who ordered an EKG. 1500 -  When I was returning to get the EKG, the patient rang the bell and said that the left side of his face had a different sensation than the left side. Patient said his mouth and tongue felt 'swollen' and that it felt numb. Code S level 1 called. EKG obtained. Patient's .

## 2020-01-07 NOTE — PROGRESS NOTES
OT deferred this afternoon 2/2 patient having stroke like symptoms with code s being called. Will follow up tomorrow to resume OT treatment.

## 2020-01-08 ENCOUNTER — APPOINTMENT (OUTPATIENT)
Dept: MRI IMAGING | Age: 60
DRG: 554 | End: 2020-01-08
Attending: PSYCHIATRY & NEUROLOGY
Payer: MEDICARE

## 2020-01-08 LAB
ANA HOMOGEN TITR SER: ABNORMAL {TITER}
ANA SPECKLED TITR SER: ABNORMAL {TITER}
ANA TITR SER IF: POSITIVE {TITER}
ANION GAP SERPL CALC-SCNC: 5 MMOL/L (ref 5–15)
ASO AB SERPL-ACNC: <20 IU/ML (ref 0–200)
ATRIAL RATE: 92 BPM
BUN SERPL-MCNC: 28 MG/DL (ref 6–20)
BUN/CREAT SERPL: 22 (ref 12–20)
CALCIUM SERPL-MCNC: 8.8 MG/DL (ref 8.5–10.1)
CALCULATED P AXIS, ECG09: 61 DEGREES
CALCULATED R AXIS, ECG10: -36 DEGREES
CALCULATED T AXIS, ECG11: 45 DEGREES
CHLORIDE SERPL-SCNC: 101 MMOL/L (ref 97–108)
CHOLEST SERPL-MCNC: 202 MG/DL
CO2 SERPL-SCNC: 30 MMOL/L (ref 21–32)
CREAT SERPL-MCNC: 1.29 MG/DL (ref 0.7–1.3)
DIAGNOSIS, 93000: NORMAL
FOLATE SERPL-MCNC: 8.9 NG/ML (ref 5–21)
GLUCOSE BLD STRIP.AUTO-MCNC: 289 MG/DL (ref 65–100)
GLUCOSE BLD STRIP.AUTO-MCNC: 301 MG/DL (ref 65–100)
GLUCOSE BLD STRIP.AUTO-MCNC: 348 MG/DL (ref 65–100)
GLUCOSE BLD STRIP.AUTO-MCNC: 421 MG/DL (ref 65–100)
GLUCOSE SERPL-MCNC: 249 MG/DL (ref 65–100)
HDLC SERPL-MCNC: 99 MG/DL
HDLC SERPL: 2 {RATIO} (ref 0–5)
LDLC SERPL CALC-MCNC: 70.4 MG/DL (ref 0–100)
LIPID PROFILE,FLP: ABNORMAL
MAGNESIUM SERPL-MCNC: 2.6 MG/DL (ref 1.6–2.4)
NOTE:, 016270: ABNORMAL
P-R INTERVAL, ECG05: 134 MS
POTASSIUM SERPL-SCNC: 4.4 MMOL/L (ref 3.5–5.1)
Q-T INTERVAL, ECG07: 346 MS
QRS DURATION, ECG06: 90 MS
QTC CALCULATION (BEZET), ECG08: 427 MS
SERVICE CMNT-IMP: ABNORMAL
SODIUM SERPL-SCNC: 136 MMOL/L (ref 136–145)
TRIGL SERPL-MCNC: 163 MG/DL (ref ?–150)
VENTRICULAR RATE, ECG03: 92 BPM
VIT B12 SERPL-MCNC: 468 PG/ML (ref 193–986)
VLDLC SERPL CALC-MCNC: 32.6 MG/DL

## 2020-01-08 PROCEDURE — 74011636637 HC RX REV CODE- 636/637: Performed by: INTERNAL MEDICINE

## 2020-01-08 PROCEDURE — 36415 COLL VENOUS BLD VENIPUNCTURE: CPT

## 2020-01-08 PROCEDURE — 80061 LIPID PANEL: CPT

## 2020-01-08 PROCEDURE — 74011250637 HC RX REV CODE- 250/637: Performed by: INTERNAL MEDICINE

## 2020-01-08 PROCEDURE — 74011250636 HC RX REV CODE- 250/636: Performed by: INTERNAL MEDICINE

## 2020-01-08 PROCEDURE — 82607 VITAMIN B-12: CPT

## 2020-01-08 PROCEDURE — 70551 MRI BRAIN STEM W/O DYE: CPT

## 2020-01-08 PROCEDURE — 74011250637 HC RX REV CODE- 250/637: Performed by: HOSPITALIST

## 2020-01-08 PROCEDURE — 97116 GAIT TRAINING THERAPY: CPT

## 2020-01-08 PROCEDURE — 82962 GLUCOSE BLOOD TEST: CPT

## 2020-01-08 PROCEDURE — 83735 ASSAY OF MAGNESIUM: CPT

## 2020-01-08 PROCEDURE — 65270000029 HC RM PRIVATE

## 2020-01-08 PROCEDURE — 80048 BASIC METABOLIC PNL TOTAL CA: CPT

## 2020-01-08 PROCEDURE — 97535 SELF CARE MNGMENT TRAINING: CPT | Performed by: OCCUPATIONAL THERAPIST

## 2020-01-08 PROCEDURE — 97110 THERAPEUTIC EXERCISES: CPT

## 2020-01-08 RX ADMIN — NIFEDIPINE 90 MG: 90 TABLET, FILM COATED, EXTENDED RELEASE ORAL at 08:37

## 2020-01-08 RX ADMIN — DOCUSATE SODIUM 100 MG: 100 CAPSULE, LIQUID FILLED ORAL at 08:32

## 2020-01-08 RX ADMIN — HUMAN INSULIN 25 UNITS: 100 INJECTION, SUSPENSION SUBCUTANEOUS at 22:57

## 2020-01-08 RX ADMIN — Medication 10 ML: at 13:34

## 2020-01-08 RX ADMIN — METFORMIN HYDROCHLORIDE 1000 MG: 500 TABLET ORAL at 17:00

## 2020-01-08 RX ADMIN — OXYCODONE 5 MG: 5 TABLET ORAL at 13:34

## 2020-01-08 RX ADMIN — METHYLPREDNISOLONE SODIUM SUCCINATE 125 MG: 125 INJECTION, POWDER, FOR SOLUTION INTRAMUSCULAR; INTRAVENOUS at 13:34

## 2020-01-08 RX ADMIN — METFORMIN HYDROCHLORIDE 1000 MG: 500 TABLET ORAL at 08:33

## 2020-01-08 RX ADMIN — INSULIN LISPRO 7 UNITS: 100 INJECTION, SOLUTION INTRAVENOUS; SUBCUTANEOUS at 12:30

## 2020-01-08 RX ADMIN — TAMSULOSIN HYDROCHLORIDE 0.4 MG: 0.4 CAPSULE ORAL at 08:33

## 2020-01-08 RX ADMIN — LOSARTAN POTASSIUM 50 MG: 50 TABLET, FILM COATED ORAL at 08:32

## 2020-01-08 RX ADMIN — ENOXAPARIN SODIUM 40 MG: 40 INJECTION SUBCUTANEOUS at 22:58

## 2020-01-08 RX ADMIN — INSULIN LISPRO 4 UNITS: 100 INJECTION, SOLUTION INTRAVENOUS; SUBCUTANEOUS at 22:57

## 2020-01-08 RX ADMIN — COLCHICINE 0.6 MG: 0.6 TABLET, FILM COATED ORAL at 08:37

## 2020-01-08 RX ADMIN — ATORVASTATIN CALCIUM 10 MG: 10 TABLET, FILM COATED ORAL at 08:32

## 2020-01-08 RX ADMIN — GABAPENTIN 300 MG: 300 CAPSULE ORAL at 08:34

## 2020-01-08 RX ADMIN — GABAPENTIN 300 MG: 300 CAPSULE ORAL at 17:00

## 2020-01-08 RX ADMIN — INSULIN LISPRO 5 UNITS: 100 INJECTION, SOLUTION INTRAVENOUS; SUBCUTANEOUS at 08:31

## 2020-01-08 RX ADMIN — METHYLPREDNISOLONE SODIUM SUCCINATE 125 MG: 125 INJECTION, POWDER, FOR SOLUTION INTRAMUSCULAR; INTRAVENOUS at 22:57

## 2020-01-08 RX ADMIN — TRAZODONE HYDROCHLORIDE 50 MG: 50 TABLET ORAL at 23:05

## 2020-01-08 RX ADMIN — DOCUSATE SODIUM 100 MG: 100 CAPSULE, LIQUID FILLED ORAL at 17:01

## 2020-01-08 RX ADMIN — METHYLPREDNISOLONE SODIUM SUCCINATE 125 MG: 125 INJECTION, POWDER, FOR SOLUTION INTRAMUSCULAR; INTRAVENOUS at 06:19

## 2020-01-08 RX ADMIN — Medication 10 ML: at 06:19

## 2020-01-08 RX ADMIN — TRAMADOL HYDROCHLORIDE 50 MG: 50 TABLET, FILM COATED ORAL at 11:12

## 2020-01-08 RX ADMIN — Medication 10 ML: at 22:57

## 2020-01-08 RX ADMIN — INSULIN LISPRO 7 UNITS: 100 INJECTION, SOLUTION INTRAVENOUS; SUBCUTANEOUS at 16:59

## 2020-01-08 RX ADMIN — GABAPENTIN 300 MG: 300 CAPSULE ORAL at 22:57

## 2020-01-08 RX ADMIN — ASPIRIN 81 MG CHEWABLE TABLET 81 MG: 81 TABLET CHEWABLE at 08:32

## 2020-01-08 NOTE — PROGRESS NOTES
Problem: Falls - Risk of  Goal: *Absence of Falls  Description  Document Darshana Keyes Fall Risk and appropriate interventions in the flowsheet.   Outcome: Progressing Towards Goal  Note: Fall Risk Interventions:  Mobility Interventions: Patient to call before getting OOB         Medication Interventions: Patient to call before getting OOB    Elimination Interventions: Call light in reach, Patient to call for help with toileting needs, Urinal in reach

## 2020-01-08 NOTE — PROGRESS NOTES
Problem: Mobility Impaired (Adult and Pediatric)  Goal: *Acute Goals and Plan of Care (Insert Text)  Description  FUNCTIONAL STATUS PRIOR TO ADMISSION: Patient was independent and active without use of DME. Has been bed bound for the past 3 weeks secondary to R knee pain, R ankle and R elbow. Was previously able to ambulate with crutches when pain in knee first began. HOME SUPPORT PRIOR TO ADMISSION: The patient lived with wife but did not require assist prior to issue. Family assisted him after he was unable to tolerate walking but they work during the day. Physical Therapy Goals  Revised 1/8/2020  1. Patient will transfer from bed to chair and chair to bed with modified independence using the least restrictive device within 7 day(s). 2.  Patient will perform sit to stand with modified independence within 7 day(s). 3.  Patient will ambulate with modified independence for 150 feet with the least restrictive device within 7 day(s). Physical Therapy Goals  Initiated 1/2/2020  1. Patient will move from supine to sit and sit to supine  in bed with modified independence within 7 day(s). Met 1/8/19.  2.  Patient will transfer from bed to chair and chair to bed with modified independence using the least restrictive device within 7 day(s). Not met  3. Patient will perform sit to stand with modified independence within 7 day(s). Not met  4. Patient will ambulate with modified independence for 20 feet with the least restrictive device within 7 day(s). Not met      Outcome: Progressing Towards Goal   PHYSICAL THERAPY TREATMENT: WEEKLY REASSESSMENT  Patient: Marly Marcus (58 y.o. male)  Date: 1/8/2020  Primary Diagnosis: Acute infective polyarthritis ([facility] [address] [M00.9]  Gout attack [M10.9]  Gout flare [M10.9]  Acute gouty arthritis [M10.9]        Precautions:  Fall      ASSESSMENT  Patient continues with skilled PT services and is progressing towards goals.  He reports pain much better controlled with most pain in the R knee. The right knee is contracted with -30-80 degrees of motion with moderate to severe pain at end ranges. Instructed patient on use of positioning to improve extension and flexion ROM slowly over time - he verbalized understanding. Gait progressed to 60 feet using RW with R arm platform with cg assistance. Patient's progression toward goals since last assessment:   Physical Therapy Goals  Initiated 1/2/2020  1. Patient will move from supine to sit and sit to supine  in bed with modified independence within 7 day(s). Met 1/8/19.  2.  Patient will transfer from bed to chair and chair to bed with modified independence using the least restrictive device within 7 day(s). Not met  3. Patient will perform sit to stand with modified independence within 7 day(s). Not met  4. Patient will ambulate with modified independence for 20 feet with the least restrictive device within 7 day(s). Not met    Now independent with bed mobility, cg assistance for bed to chair transfers using RW and cg assistance for ambulation      Current Level of Function Impacting Discharge (mobility/balance): cg assistance    Functional Outcome Measure: The patient scored 65/100 on the Barthel outcome measure which is indicative of 35% functional impairment. Other factors to consider for discharge: R knee flexion contracture; independent PLOF; lives with spouse         PLAN :  Goals have been updated based on progression since last assessment. Patient continues to benefit from skilled intervention to address the above impairments. Recommendations and Planned Interventions: transfer training, gait training, therapeutic exercises, patient and family training/education, and therapeutic activities      Frequency/Duration: Patient will be followed by physical therapy:  5 times a week to address goals.     Recommendation for discharge: (in order for the patient to meet his/her long term goals)  Therapy 3 hours per day 5-7 days per week    This discharge recommendation:  Has been made in collaboration with the attending provider and/or case management    IF patient discharges home will need the following DME: to be determined (TBD)         SUBJECTIVE:   Patient stated  Thanks for the exercise tips.     OBJECTIVE DATA SUMMARY:   HISTORY:    Past Medical History:   Diagnosis Date    Arthritis     Chronic pain     Diabetes (HCC)     GERD (gastroesophageal reflux disease)     Gout     Hypertension     Other ill-defined conditions(799.89)     gout     Past Surgical History:   Procedure Laterality Date    HX APPENDECTOMY      HX ORTHOPAEDIC      RIGHT knee scope       Personal factors and/or comorbidities impacting plan of care: pain management    Home Situation  Home Environment: Private residence  # Steps to Enter: 1  One/Two Story Residence: Two story  # of Interior Steps: 13  Height of Each Step (in): 8 inches  Interior Rails: Right  Lift Chair Available: No  Living Alone: No  Support Systems: Child(lakshmi), Muslim / [place] community, Family member(s), Friends \ neighbors  Patient Expects to be Discharged to[de-identified] Private residence  Current DME Used/Available at Home: Glucometer    EXAMINATION/PRESENTATION/DECISION MAKING:   Critical Behavior:  Neurologic State: Alert  Orientation Level: Oriented X4  Cognition: Appropriate decision making, Appropriate for age attention/concentration, Appropriate safety awareness, Follows commands  Safety/Judgement: Fall prevention, Awareness of environment, Good awareness of safety precautions(RLE weakness)  Hearing:   Auditory  Auditory Impairment: None  Range Of Motion:  AROM: Generally decreased, functional           PROM: Within functional limits(except R knee/ankle contracture(-30/-10))           Strength:    Strength: Generally decreased, functional(R knee 3-/5(-30 to 80 degrees);LLE 4/5)                    Tone & Sensation:   Tone: Normal              Sensation: Intact Coordination:  Coordination: Within functional limits  Functional Mobility:  Bed Mobility:  Rolling: (up in chair when PT arrived)  Supine to Sit: Independent  Sit to Supine: Independent  Scooting: Independent  Transfers:  Sit to Stand: Stand-by assistance  Stand to Sit: Stand-by assistance        Bed to Chair: Stand-by assistance; Adaptive equipment(RW)              Balance:   Sitting: Intact  Sitting - Static: Good (unsupported)  Sitting - Dynamic: Good (unsupported)  Standing: Impaired  Standing - Static: Good;Constant support  Standing - Dynamic : Fair;Constant support  Ambulation/Gait Training:  Distance (ft): 60 Feet (ft)  Assistive Device: Walker, rolling;Gait belt  Ambulation - Level of Assistance: Contact guard assistance        Gait Abnormalities: Antalgic; Step to gait  Right Side Weight Bearing: As tolerated  Left Side Weight Bearing: Full  Base of Support: Shift to left  Stance: Right decreased  Speed/Patricia: Slow  Step Length: Right shortened;Left shortened(R knee unable to extend fully;uses forefoot for stance)                    Therapeutic Exercises: Ankle pumps, quad sets, heel slides. Functional Measure:  Barthel Index:    Bathin  Bladder: 10  Bowels: 10  Groomin  Dressin  Feeding: 10  Mobility: 10  Stairs: 0  Toilet Use: 5  Transfer (Bed to Chair and Back): 10  Total: 65/100       The Barthel ADL Index: Guidelines  1. The index should be used as a record of what a patient does, not as a record of what a patient could do. 2. The main aim is to establish degree of independence from any help, physical or verbal, however minor and for whatever reason. 3. The need for supervision renders the patient not independent. 4. A patient's performance should be established using the best available evidence. Asking the patient, friends/relatives and nurses are the usual sources, but direct observation and common sense are also important. However direct testing is not needed.   5. Usually the patient's performance over the preceding 24-48 hours is important, but occasionally longer periods will be relevant. 6. Middle categories imply that the patient supplies over 50 per cent of the effort. 7. Use of aids to be independent is allowed. Clementine Hyatt., Barthel, D.W. (0015). Functional evaluation: the Barthel Index. [address] [facility] (14)2. John Espinosa hardeep CORY Blas, Ria Bocanegra, Riley Chaney., Mariel, [address] [facility] (). Measuring the change indisability after inpatient rehabilitation; comparison of the responsiveness of the Barthel Index and Functional Hobgood Measure. Journal of Neurology, Neurosurgery, and Psychiatry, 66(4), 039-098. AMANDA Rivera, SHAHLA Martinez, & Archana Rodriguez M.A. (2004.) Assessment of post-stroke quality of life in cost-effectiveness studies: The usefulness of the Barthel Index and the EuroQoL-5D. Quality of Life Research, 13, 427-43     Pain Ratin/10 R knee at rest unloaded; 5-6/10 R knee with wb during gait and transfers. Activity Tolerance:   Good and SpO2 stable on RA  Please refer to the flowsheet for vital signs taken during this treatment. After treatment patient left in no apparent distress:   Supine in bed, Heels elevated for pressure relief, Call bell within reach, and Side rails x 3    COMMUNICATION/EDUCATION:   The patients plan of care was discussed with: Registered Nurse and Rehabilitation Attendant. Fall prevention education was provided and the patient/caregiver indicated understanding., Patient/family have participated as able in goal setting and plan of care. , and Patient/family agree to work toward stated goals and plan of care.     Thank you for this referral.  Jenni Kidd, PT   Time Calculation: 24 mins

## 2020-01-08 NOTE — CONSULTS
NEUROLOGY NOTE     Chief Complaint   Patient presents with    Pain (Chronic)     Patient arrives via EMS to triage c/o chronic pain to right arm and leg; recent eval at [facility] and given oxycodone       Reason for Consult  I have been asked to see the patient in neurological consultation by Camille Singh DO to render advice and opinion regarding possible stroke    HPI  The patient is a 80-year-old man who presents to the hospital on 12/30/2019. He came in because of worsening pain and swelling in the right ankle, elbow and knee. Patient was admitted because of acute body arthritis and was started on prednisone along with colchicine. Rheumatology was consulted and the work-up is in progress. Patient is on high-dose IV steroids. Neurology has been consulted as he was having left facial numbness along with chest pain. Patient states that on 1/7/2020, he was sitting and all of a sudden he started having left-sided chest pain which was pounding and pressure kind and after that he started noticing numbness on the left side of the face. It lasted for around 2 to 3 hours and then resolved. There was no problem with speech. There was no numbness or weakness of the upper or lower extremities. EKG was normal.  For work-up, patient did have a CT scan of the head and also MRI of the brain and both of them have been normal.    Patient's Lyme antibodies are negative, rheumatoid factor is normal, uric acid is elevated and HbA1c 6.9. Neurology has been consulted for possibility of stroke.     ROS  A ten system review of constitutional, cardiovascular, respiratory, musculoskeletal, endocrine, skin, SHEENT, genitourinary, psychiatric and neurologic systems was obtained and is unremarkable except as stated in HPI     PMH  Past Medical History:   Diagnosis Date    Arthritis     Chronic pain     Diabetes ([address]     GERD (gastroesophageal reflux disease)     Gout     Hypertension     Other ill-defined conditions(799.89)     gout       FH  Family History   Problem Relation Age of Onset    Hypertension Mother     Diabetes Mother     Heart Disease Father        SH  Social History     Socioeconomic History    Marital status: LEGALLY      Spouse name: Not on file    Number of children: Not on file    Years of education: Not on file    Highest education level: Not on file   Tobacco Use    Smoking status: Never Smoker    Smokeless tobacco: Never Used   Substance and Sexual Activity    Alcohol use: Yes     Comment: occassionally \"weekends\"    Drug use: No    Sexual activity: Not Currently       ALLERGIES  Allergies   Allergen Reactions    Uloric [Febuxostat] Anaphylaxis    Allopurinol Hives       PHYSICAL EXAMINATION:   Patient Vitals for the past 24 hrs:   Temp Pulse Resp BP SpO2   01/08/20 1133 98 °F (36.7 °C) 86 18 149/74 97 %   01/08/20 0759 98.8 °F (37.1 °C) 70 18 127/74 96 %   01/08/20 0430 98.4 °F (36.9 °C) 72 18 128/79 96 %   01/07/20 2355 98.1 °F (36.7 °C) 67 18 114/59 96 %   01/07/20 2027 97.9 °F (36.6 °C) 87 16 137/74 97 %        General:   General appearance: Pt is in no acute distress   Distal pulses are preserved  Fundoscopic exam: attempted    Neurological Examination:   Mental Status:  AAO x3. Speech is fluent. Follows commands, has normal fund of knowledge, attention, short term recall, comprehension and insight. Cranial Nerves: Visual fields are full. PERRL, Extraocular movements are full. Facial sensation intact. Facial movement intact. Hearing intact to conversation. Palate elevates symmetrically. Shoulder shrug symmetric. Tongue midline. Motor: Strength is 5/5 in all 4 ext. Normal tone. No atrophy. Sensation: Light touch - Normal    Reflexes: DTRs 2+ throughout. Plantar responses downgoing. Coordination/Cerebellar: Intact to finger-nose-finger     Gait: deferred    Skin: No significant bruising or lacerations.     LAB DATA REVIEWED:    Recent Results (from the past 24 hour(s))   GLUCOSE, POC    Collection Time: 01/07/20  9:06 PM   Result Value Ref Range    Glucose (POC) 452 (H) 65 - 100 mg/dL    Performed by [place], BASIC    Collection Time: 01/08/20  3:20 AM   Result Value Ref Range    Sodium 136 136 - 145 mmol/L    Potassium 4.4 3.5 - 5.1 mmol/L    Chloride 101 97 - 108 mmol/L    CO2 30 21 - 32 mmol/L    Anion gap 5 5 - 15 mmol/L    Glucose 249 (H) 65 - 100 mg/dL    BUN 28 (H) 6 - 20 MG/DL    Creatinine 1.29 0.70 - 1.30 MG/DL    BUN/Creatinine ratio 22 (H) 12 - 20      GFR est AA >60 >60 ml/min/1.73m2    GFR est non-AA 57 (L) >60 ml/min/1.73m2    Calcium 8.8 8.5 - 10.1 MG/DL   MAGNESIUM    Collection Time: 01/08/20  3:20 AM   Result Value Ref Range    Magnesium 2.6 (H) 1.6 - 2.4 mg/dL   GLUCOSE, POC    Collection Time: 01/08/20  7:30 AM   Result Value Ref Range    Glucose (POC) 289 (H) 65 - 100 mg/dL    Performed by Jalen Aguilar (PCT)    GLUCOSE, POC    Collection Time: 01/08/20 11:28 AM   Result Value Ref Range    Glucose (POC) 301 (H) 65 - 100 mg/dL    Performed by Jalen Aguilar (PCT)    GLUCOSE, POC    Collection Time: 01/08/20  4:12 PM   Result Value Ref Range    Glucose (POC) 348 (H) 65 - 100 mg/dL    Performed by Jalen Aguilar (PCT)         HOME MEDS  Prior to Admission Medications   Prescriptions Last Dose Informant Patient Reported? Taking? NIFEdipine ER (ADALAT CC) 90 mg ER tablet 12/29/2019 at Unknown time Self Yes Yes   Sig: Take 90 mg by mouth daily. amLODIPine (NORVASC) 10 mg tablet 12/29/2019 at Unknown time Self Yes Yes   Sig: Take 10 mg by mouth daily. aspirin 81 mg chewable tablet 12/30/2019 at Unknown time Self Yes Yes   Sig: Take 81 mg by mouth daily. atorvastatin (LIPITOR) 10 mg tablet 12/29/2019 at Unknown time Self Yes Yes   Sig: Take 10 mg by mouth daily. colchicine 0.6 mg tablet 12/29/2019 at Unknown time Self Yes Yes   Sig: Take 0.6 mg by mouth daily.    dulaglutide (TRULICITY) 1.5 YT/1.0 mL sub-q pen 2019 at Unknown time Self Yes Yes   Si.5 mg by SubCUTAneous route every seven (7) days. gabapentin (NEURONTIN) 300 mg capsule 2019 at Unknown time Self Yes Yes   Sig: Take 300 mg by mouth three (3) times daily. indomethacin SR (INDOCIN SR) 75 mg SR capsule 2019 at Unknown time Self Yes Yes   Sig: Take 75 mg by mouth daily. irbesartan (AVAPRO) 300 mg tablet 2019 at Unknown time Self Yes Yes   Sig: Take 300 mg by mouth daily. melatonin 5 mg cap capsule 2019 at Unknown time Self Yes Yes   Sig: Take 5 mg by mouth nightly. metFORMIN (GLUCOPHAGE) 1,000 mg tablet 2019 at Unknown time Self Yes Yes   Sig: Take 1,000 mg by mouth two (2) times daily (with meals). oxyCODONE IR (ROXICODONE) 5 mg immediate release tablet 2019 at Unknown time Self Yes Yes   Sig: Take 5 mg by mouth every six (6) hours as needed for Pain.   tamsulosin (FLOMAX) 0.4 mg capsule 2019 at Unknown time Self Yes Yes   Sig: Take 0.4 mg by mouth daily. traZODone (DESYREL) 50 mg tablet 2019 at Unknown time Self Yes Yes   Sig: Take 50 mg by mouth as needed for Sleep.       Facility-Administered Medications: None       CURRENT MEDS  Current Facility-Administered Medications   Medication Dose Route Frequency    insulin NPH (NOVOLIN N, HUMULIN N) injection 25 Units  25 Units SubCUTAneous Q8H    And    methylPREDNISolone (PF) (SOLU-MEDROL) injection 125 mg  125 mg IntraVENous Q8H    metFORMIN (GLUCOPHAGE) tablet 1,000 mg  1,000 mg Oral BID WITH MEALS    NIFEdipine ER (PROCARDIA XL) tablet 90 mg  90 mg Oral DAILY    atorvastatin (LIPITOR) tablet 10 mg  10 mg Oral DAILY    gabapentin (NEURONTIN) capsule 300 mg  300 mg Oral TID    aspirin chewable tablet 81 mg  81 mg Oral DAILY    colchicine tablet 0.6 mg  0.6 mg Oral DAILY    tamsulosin (FLOMAX) capsule 0.4 mg  0.4 mg Oral DAILY    sodium chloride (NS) flush 5-40 mL  5-40 mL IntraVENous Q8H    docusate sodium (COLACE) capsule 100 mg 100 mg Oral BID    enoxaparin (LOVENOX) injection 40 mg  40 mg SubCUTAneous Q24H    insulin lispro (HUMALOG) injection   SubCUTAneous AC&HS    losartan (COZAAR) tablet 50 mg  50 mg Oral DAILY       Stroke workup    MRI Brain  Normal    Carotids:   Pending    Stroke labs:  HgBA1c    Lab Results   Component Value Date/Time    Hemoglobin A1c 6.9 (H) 12/30/2019 08:50 PM     LDL   No results found for: LDL, LDLC, DLDLP    IMPRESSION:  Jessica Corona is a 61 y.o. male who presents with joint pain and the work-up is in progress. He is on IV steroids and that is helping him significantly with the symptoms. Neurology was consulted because on 1/7/2020, he did have an episode of chest pain along with left facial numbness. There was no dysarthria and no involvement of the upper or lower extremities. There was no unilateral symptoms in the arms and the legs. Symptoms lasted for 2 to 3 hours and now the symptoms have resolved. Patient is asymptomatic at this time. Patient had CT scan of the head and MRI of the brain and they are normal.  Since it was just on the left facial numbness, the possibility of a stroke is less. I do not see any Bell's palsy either. Etiology is unclear. Will check for vitamin B12 deficiency and get ACE level. Also to evaluate risk factors for stroke, will get carotid ultrasound and lipid panel. Will optimize risk factors for stroke. Patient does already have diabetes and hyperlipidemia and takes aspirin 81 mg at home along with Lipitor.           Gabriele Roy MD  Neurologist

## 2020-01-08 NOTE — PROGRESS NOTES
PAULNIA: [facility]   1) pending insurance auth     10:30am  CM spoke to Mauricio Crane, liaison with [facility], who reported that they are still waiting on insurance auth. Mauricio rCane ensured that he will inform CM as soon as Erika Mcrae is obtained. CM will continue to follow.      Carlos Smith, [id number] [facility]

## 2020-01-08 NOTE — PROGRESS NOTES
Hospitalist Progress Note    NAME: Hilario Melo   :  1960   MRN:  [id number]       Assessment / Plan:    Acute polyarthritis  -Rt knee arthrocentesis w minimal fluid, no crystals, culture NGTD  -gout thought less likely, vs RA or ankylosing spondylitis  -lab workup underway, so far HIV and Hep screen NR; lyme ab NEG; CCP ab NEG  -uric acid 7.5, CRP > 10  -hasn't been able to tolerate allopurinol or other urate lower meds   -failed NSAIDs outpt w indomethacin  -switched back to IV solumedrol yesterday and he feels better today and is able to work better with PT.    -appreciate rheumatology consult of Dr Narda Anglin;     Episode of chest pain, resolved  -EKG no ischemic STTW changes. Troponin WNL. -CTA chest no PE or PNA    Left facial numbness, resolved  -MRI brain no acute stroke  -continue ASA, lipitor    CKD 3  -Cr appears at baseline     HTN  -BP elevated likely due to pain  -Continue losartan and nifedipine. He is no longer on amlodipine  -hydralazine prn     T2DM, uncontrolled with hyperglycemia due to steroids  -A1C 6.9  -continue metformin. He is on Trulicity PTA  -restart NPH timed with solumedrol dose  -SSI     Obesity /  Body mass index is 38.41 kg/m².     Code status: Full  Prophylaxis: Lovenox  Recommended Disposition: Home w/Family     Subjective:     Chief Complaint / Reason for Physician Visit  Follow up polyarthritis, DM, HTN  Discussed with RN events overnight. Review of Systems:  Symptom Y/N Comments  Symptom Y/N Comments   Fever/Chills n   Chest Pain n    Poor Appetite    Edema     Cough n   Abdominal Pain n    Sputum n   Joint Pain y improved   SOB/CASSIDY n   Pruritis/Rash     Nausea/vomit n   Tolerating PT/OT     Diarrhea    Tolerating Diet y    Constipation    Other       Could NOT obtain due to:      Objective:     VITALS:   Last 24hrs VS reviewed since prior progress note.  Most recent are:  Patient Vitals for the past 24 hrs:   Temp Pulse Resp BP SpO2   20 1133 98 °F (36.7 °C) 86 18 149/74 97 %   01/08/20 0759 98.8 °F (37.1 °C) 70 18 127/74 96 %   01/08/20 0430 98.4 °F (36.9 °C) 72 18 128/79 96 %   01/07/20 2355 98.1 °F (36.7 °C) 67 18 114/59 96 %   01/07/20 2027 97.9 °F (36.6 °C) 87 16 137/74 97 %   01/07/20 1735 98 °F (36.7 °C) 78 16 128/79 95 %       Intake/Output Summary (Last 24 hours) at 1/8/2020 1651  Last data filed at 1/8/2020 1009  Gross per 24 hour   Intake    Output 2700 ml   Net -2700 ml        PHYSICAL EXAM:  General: WD, WN. Alert, cooperative, no acute distress    EENT:  EOMI. Anicteric sclerae. MMM  Resp:  CTA bilaterally, no wheezing or rales. No accessory muscle use  CV:  Regular  rhythm,  No edema  GI:  Soft, Non distended, Non tender.  +Bowel sounds  Neurologic:  Alert and oriented X 3, normal speech,   Psych:   Good insight. Not anxious nor agitated  Skin:  No rashes. No jaundice    Reviewed most current lab test results and cultures  YES  Reviewed most current radiology test results   YES  Review and summation of old records today    NO  Reviewed patient's current orders and MAR    YES  PMH/ reviewed - no change compared to H&P  ________________________________________________________________________  Care Plan discussed with:    Comments   Patient x    Family      RN x    Care Manager     Consultant                        Multidiciplinary team rounds were held today with , nursing, pharmacist and clinical coordinator. Patient's plan of care was discussed; medications were reviewed and discharge planning was addressed.      ________________________________________________________________________  Total NON critical care TIME:  30   Minutes    Total CRITICAL CARE TIME Spent:   Minutes non procedure based      Comments   >50% of visit spent in counseling and coordination of care x    ________________________________________________________________________  Mahad Omalley MD     Procedures: see electronic medical records for all procedures/Xrays and details which were not copied into this note but were reviewed prior to creation of Plan. LABS:  I reviewed today's most current labs and imaging studies. Pertinent labs include:  No results for input(s): WBC, HGB, HCT, PLT, HGBEXT, HCTEXT, PLTEXT, HGBEXT, HCTEXT, PLTEXT in the last 72 hours.   Recent Labs     01/08/20  0320      K 4.4      CO2 30   *   BUN 28*   CREA 1.29   CA 8.8   MG 2.6*       Signed: Loetta Fothergill, MD

## 2020-01-08 NOTE — PROGRESS NOTES
Bedside and Verbal shift change report given to MANNY Anders (oncoming nurse) by Peri Anaya (offgoing nurse). Report included the following information SBAR, Kardex, ED Summary, Procedure Summary, Intake/Output, MAR, Recent Results and Med Rec Status.

## 2020-01-08 NOTE — PROGRESS NOTES
Problem: Self Care Deficits Care Plan (Adult)  Goal: *Acute Goals and Plan of Care (Insert Text)  Description      FUNCTIONAL STATUS PRIOR TO ADMISSION: Patient was independent and active without use of DME.    HOME SUPPORT: The patient lived with wife. Occupational Therapy Goals  Initiated 1/3/2020  1. Patient will perform grooming standing at sink with supervision/set-up within 7 day(s). 2.  Patient will perform upper body dressing with supervision/set-up within 7 day(s). 3.  Patient will perform lower body dressing with supervision/set-up within 7 day(s). 4.  Patient will perform toilet transfers with supervision/set-up within 7 day(s). 5.  Patient will perform all aspects of toileting with supervision/set-up within 7 day(s). 6.  Patient will perform sponge bathing with supervision/set-up within 7 day(s). Outcome: Progressing Towards Goal  OCCUPATIONAL THERAPY TREATMENT  Patient: Cher Hall (44 y.o. male)  Date: 1/8/2020  Diagnosis: Acute infective polyarthritis ([address] [M00.9]  Gout attack [M10.9]  Gout flare [M10.9]  Acute gouty arthritis [M10.9]   <principal problem not specified>       Precautions: Fall  Chart, occupational therapy assessment, plan of care, and goals were reviewed. ASSESSMENT  Improving pain control now only complaining of pain in R knee and ankle pain, and still unable to WB on RLE. He is still reluctant to bear weight on his R wrist due to fear of flaring up his arthritis again and still prefers to use platform on RW. Patient was overall independent with bed mobility, CGA for transfers and he was supervision/setup to CGA for dressing ADLs  today. He is requiring safety cues during retrieval of clothing items. At this point the patient is making progress and will continue to benefit from OT services. PLAN :  Patient continues to benefit from skilled intervention to address the above impairments. Continue treatment per established plan of care.   to address goals. Recommendation for discharge: (in order for the patient to meet his/her long term goals)  Therapy 3 hours per day 5-7 days per week      Equipment recommendations for successful discharge (if) home:TBD         OBJECTIVE DATA SUMMARY:   Cognitive/Behavioral Status:  Neurologic State: Alert  Orientation Level: Oriented X4  Cognition: Follows commands; Appropriate for age attention/concentration        Safety/Judgement: Awareness of environment;Decreased awareness of need for safety    Functional Mobility and Transfers for ADLs:  Bed Mobility:  Rolling: Independent  Supine to Sit: Independent  Scooting: Independent    Transfers:  Sit to Stand: Contact guard assistance  Functional Transfers  Bathroom Mobility: Contact guard assistance(ambulating with RW, with R platform)       Balance:  Sitting: Intact  Standing: Impaired  Standing - Static: Good  Standing - Dynamic : Fair    ADL Intervention:  Grooming  Brushing Teeth: Stand-by assistance(standing at sink)  Cues: Verbal cues provided    Upper Body Dressing Assistance  Pullover Shirt: Supervision;Set-up(seated)    Lower Body Dressing Assistance  Pants With Elastic Waist: Contact guard assistance; Compensatory technique training  Position Performed: Bending forward method;Seated edge of bed;Standing  Cues: Verbal cues provided    Cognitive Retraining  Safety/Judgement: Awareness of environment;Decreased awareness of need for safety    Provided safety training related to the performance of retrieving ADL items, when ambulating with a RW. Patient instructed to keep walker in front of him when at his dresser and opening a drawer, as well as when reaching into closet for needed items. Instructed patient and family to have needed items placed at a level between shoulder and knee height , so they can be easily accessed to minimize risk for LOB.  Recommended draping clothing items over front of RW, and to purchase a walker bag to carry needed items, in order to transport items to where he would perform dressing ADLs. Pain:  R knee and ankle pain. Activity Tolerance:   Fair  Please refer to the flowsheet for vital signs taken during this treatment.     After treatment patient left in no apparent distress:   Call bell within reach and sitting EOB     COMMUNICATION/COLLABORATION:   The patients plan of care was discussed with: Physical Therapist and Registered Nurse    Daniel Sethi, OTR/L  Time Calculation: 18 mins

## 2020-01-09 ENCOUNTER — APPOINTMENT (OUTPATIENT)
Dept: VASCULAR SURGERY | Age: 60
DRG: 554 | End: 2020-01-09
Attending: PSYCHIATRY & NEUROLOGY
Payer: MEDICARE

## 2020-01-09 PROBLEM — I65.23 BILATERAL CAROTID ARTERY STENOSIS: Status: ACTIVE | Noted: 2020-01-09

## 2020-01-09 PROBLEM — G45.1 TRANSIENT ISCHEMIC ATTACK INVOLVING RIGHT INTERNAL CAROTID ARTERY: Status: ACTIVE | Noted: 2020-01-09

## 2020-01-09 LAB
GLUCOSE BLD STRIP.AUTO-MCNC: 199 MG/DL (ref 65–100)
GLUCOSE BLD STRIP.AUTO-MCNC: 288 MG/DL (ref 65–100)
GLUCOSE BLD STRIP.AUTO-MCNC: 296 MG/DL (ref 65–100)
GLUCOSE BLD STRIP.AUTO-MCNC: 318 MG/DL (ref 65–100)
GLUCOSE BLD STRIP.AUTO-MCNC: 369 MG/DL (ref 65–100)
LEFT CCA DIST DIAS: 0 CM/S
LEFT CCA DIST SYS: 109.1 CM/S
LEFT CCA PROX DIAS: 18.5 CM/S
LEFT CCA PROX SYS: 155.5 CM/S
LEFT ECA DIAS: 0 CM/S
LEFT ECA SYS: 91.8 CM/S
LEFT ICA DIST DIAS: 13 CM/S
LEFT ICA DIST SYS: 44.5 CM/S
LEFT ICA MID DIAS: 11.7 CM/S
LEFT ICA MID SYS: 66.8 CM/S
LEFT ICA PROX DIAS: 5.8 CM/S
LEFT ICA PROX SYS: 57.9 CM/S
LEFT ICA/CCA SYS: 0.61
LEFT SUBCLAVIAN DIAS: 0 CM/S
LEFT SUBCLAVIAN SYS: 110.3 CM/S
LEFT VERTEBRAL DIAS: 15.33 CM/S
LEFT VERTEBRAL SYS: 54.6 CM/S
RIGHT CCA DIST DIAS: 13.5 CM/S
RIGHT CCA DIST SYS: 104.9 CM/S
RIGHT CCA PROX DIAS: 16.7 CM/S
RIGHT CCA PROX SYS: 125.4 CM/S
RIGHT ECA DIAS: 0 CM/S
RIGHT ECA SYS: 91.9 CM/S
RIGHT ICA DIST DIAS: 15.9 CM/S
RIGHT ICA DIST SYS: 56.5 CM/S
RIGHT ICA MID DIAS: 16 CM/S
RIGHT ICA MID SYS: 48.3 CM/S
RIGHT ICA PROX DIAS: 7.9 CM/S
RIGHT ICA PROX SYS: 40.8 CM/S
RIGHT ICA/CCA SYS: 0.5
RIGHT SUBCLAVIAN DIAS: 0 CM/S
RIGHT SUBCLAVIAN SYS: 105.4 CM/S
RIGHT VERTEBRAL DIAS: 13.08 CM/S
RIGHT VERTEBRAL SYS: 48.2 CM/S
SERVICE CMNT-IMP: ABNORMAL

## 2020-01-09 PROCEDURE — 93880 EXTRACRANIAL BILAT STUDY: CPT

## 2020-01-09 PROCEDURE — 86235 NUCLEAR ANTIGEN ANTIBODY: CPT

## 2020-01-09 PROCEDURE — 74011250637 HC RX REV CODE- 250/637: Performed by: INTERNAL MEDICINE

## 2020-01-09 PROCEDURE — 86225 DNA ANTIBODY NATIVE: CPT

## 2020-01-09 PROCEDURE — 82962 GLUCOSE BLOOD TEST: CPT

## 2020-01-09 PROCEDURE — 82164 ANGIOTENSIN I ENZYME TEST: CPT

## 2020-01-09 PROCEDURE — 36415 COLL VENOUS BLD VENIPUNCTURE: CPT

## 2020-01-09 PROCEDURE — 74011250636 HC RX REV CODE- 250/636: Performed by: INTERNAL MEDICINE

## 2020-01-09 PROCEDURE — 86160 COMPLEMENT ANTIGEN: CPT

## 2020-01-09 PROCEDURE — 74011636637 HC RX REV CODE- 636/637: Performed by: INTERNAL MEDICINE

## 2020-01-09 PROCEDURE — 65270000029 HC RM PRIVATE

## 2020-01-09 RX ORDER — PREDNISONE 20 MG/1
60 TABLET ORAL
Status: DISCONTINUED | OUTPATIENT
Start: 2020-01-10 | End: 2020-01-11 | Stop reason: HOSPADM

## 2020-01-09 RX ORDER — LANOLIN ALCOHOL/MO/W.PET/CERES
3 CREAM (GRAM) TOPICAL
Status: DISCONTINUED | OUTPATIENT
Start: 2020-01-09 | End: 2020-01-11 | Stop reason: HOSPADM

## 2020-01-09 RX ORDER — TRAZODONE HYDROCHLORIDE 50 MG/1
50 TABLET ORAL
Status: DISCONTINUED | OUTPATIENT
Start: 2020-01-09 | End: 2020-01-11 | Stop reason: HOSPADM

## 2020-01-09 RX ORDER — INSULIN LISPRO 100 [IU]/ML
10 INJECTION, SOLUTION INTRAVENOUS; SUBCUTANEOUS ONCE
Status: COMPLETED | OUTPATIENT
Start: 2020-01-09 | End: 2020-01-09

## 2020-01-09 RX ADMIN — COLCHICINE 0.6 MG: 0.6 TABLET, FILM COATED ORAL at 10:19

## 2020-01-09 RX ADMIN — GABAPENTIN 300 MG: 300 CAPSULE ORAL at 17:43

## 2020-01-09 RX ADMIN — OXYCODONE 5 MG: 5 TABLET ORAL at 10:18

## 2020-01-09 RX ADMIN — INSULIN LISPRO 10 UNITS: 100 INJECTION, SOLUTION INTRAVENOUS; SUBCUTANEOUS at 17:43

## 2020-01-09 RX ADMIN — TRAZODONE HYDROCHLORIDE 50 MG: 50 TABLET ORAL at 22:52

## 2020-01-09 RX ADMIN — INSULIN LISPRO 3 UNITS: 100 INJECTION, SOLUTION INTRAVENOUS; SUBCUTANEOUS at 22:53

## 2020-01-09 RX ADMIN — GABAPENTIN 300 MG: 300 CAPSULE ORAL at 10:04

## 2020-01-09 RX ADMIN — INSULIN LISPRO 5 UNITS: 100 INJECTION, SOLUTION INTRAVENOUS; SUBCUTANEOUS at 13:07

## 2020-01-09 RX ADMIN — ASPIRIN 81 MG CHEWABLE TABLET 81 MG: 81 TABLET CHEWABLE at 10:04

## 2020-01-09 RX ADMIN — ENOXAPARIN SODIUM 40 MG: 40 INJECTION SUBCUTANEOUS at 22:53

## 2020-01-09 RX ADMIN — METFORMIN HYDROCHLORIDE 1000 MG: 500 TABLET ORAL at 10:05

## 2020-01-09 RX ADMIN — INSULIN LISPRO 2 UNITS: 100 INJECTION, SOLUTION INTRAVENOUS; SUBCUTANEOUS at 10:02

## 2020-01-09 RX ADMIN — ATORVASTATIN CALCIUM 10 MG: 10 TABLET, FILM COATED ORAL at 10:04

## 2020-01-09 RX ADMIN — Medication 10 ML: at 14:47

## 2020-01-09 RX ADMIN — HUMAN INSULIN 25 UNITS: 100 INJECTION, SUSPENSION SUBCUTANEOUS at 05:56

## 2020-01-09 RX ADMIN — METHYLPREDNISOLONE SODIUM SUCCINATE 125 MG: 125 INJECTION, POWDER, FOR SOLUTION INTRAMUSCULAR; INTRAVENOUS at 14:47

## 2020-01-09 RX ADMIN — LOSARTAN POTASSIUM 50 MG: 50 TABLET, FILM COATED ORAL at 10:04

## 2020-01-09 RX ADMIN — INSULIN LISPRO 10 UNITS: 100 INJECTION, SOLUTION INTRAVENOUS; SUBCUTANEOUS at 03:39

## 2020-01-09 RX ADMIN — Medication 10 ML: at 05:56

## 2020-01-09 RX ADMIN — Medication 10 ML: at 22:54

## 2020-01-09 RX ADMIN — MELATONIN 3 MG: at 22:52

## 2020-01-09 RX ADMIN — TAMSULOSIN HYDROCHLORIDE 0.4 MG: 0.4 CAPSULE ORAL at 10:04

## 2020-01-09 RX ADMIN — HUMAN INSULIN 30 UNITS: 100 INJECTION, SUSPENSION SUBCUTANEOUS at 14:47

## 2020-01-09 RX ADMIN — METHYLPREDNISOLONE SODIUM SUCCINATE 125 MG: 125 INJECTION, POWDER, FOR SOLUTION INTRAMUSCULAR; INTRAVENOUS at 05:56

## 2020-01-09 RX ADMIN — GABAPENTIN 300 MG: 300 CAPSULE ORAL at 22:52

## 2020-01-09 RX ADMIN — METFORMIN HYDROCHLORIDE 1000 MG: 500 TABLET ORAL at 17:43

## 2020-01-09 RX ADMIN — NIFEDIPINE 90 MG: 90 TABLET, FILM COATED, EXTENDED RELEASE ORAL at 10:19

## 2020-01-09 NOTE — PROGRESS NOTES
Spoke with PT who reports patient is declining to work with therapy this afternoon. Will defer OT today and follow back if necessary tomorrow.

## 2020-01-09 NOTE — CONSULTS
REEMA 1:320  Checking dsDNA, sm, rnp, Selin-2, scl 70, ssa, ssb  Basic labs unremarkable  MRI today for an episode of facial numbness was unremarkable for acute pathology    Once patient more stable on IV steroids and is able to ambulate, can transition to prednsione 60mg po daily

## 2020-01-09 NOTE — PROGRESS NOTES
Initial Nutrition Assessment:    INTERVENTIONS/RECOMMENDATIONS:   · Consider tighter carb restriction with Diabetic with options 7116-0902 kcal     ASSESSMENT:   Chart reviewed, medically noted forAcute polyarthritis, REEMA+, CKD3, T2DM and PMH shown below. Assessing pt due to LOS. Pt reports good appetite and eating the majority of all meals. No significant weight loss PTA.       Past Medical History:   Diagnosis Date    Arthritis     Chronic pain     Diabetes ([address]     GERD (gastroesophageal reflux disease)     Gout     Hypertension     Other ill-defined conditions(799.89)     gout       Diet Order: Consistent carb  % Eaten:    Patient Vitals for the past 72 hrs:   % Diet Eaten   01/09/20 1415 100 %   01/08/20 1704 100 %   01/08/20 0929 100 %   01/07/20 1512 100 %   01/07/20 0923 100 %     Pertinent Medications: [x]Reviewed: colace, humalog, NPH linked with solumedrol, metformin,   Pertinent Labs: [x]Reviewed: -452, HbA1c 6.9  Food Allergies: [x]NKFA  []Other   Last BM: 1/7  Edema:        []RUE   []LUE   [x]RLE 1+  []LLE      Pressure Injury:  n/a    [] Stage I   [] Stage II   [] Stage III   [] Stage IV      Wt Readings from Last 30 Encounters:   12/30/19 108 kg (238 lb)   09/01/19 111 kg (244 lb 11.4 oz)   05/29/19 116.6 kg (257 lb)   04/16/19 114.3 kg (252 lb)   02/21/19 114.3 kg (252 lb)   01/29/19 118.8 kg (262 lb)   12/24/18 115.5 kg (254 lb 10.1 oz)   09/06/16 111.9 kg (246 lb 11.1 oz)   07/07/15 110.2 kg (243 lb)   04/14/15 109.8 kg (242 lb)   01/05/15 113.4 kg (250 lb)   09/22/14 111.1 kg (245 lb)   12/13/13 110.2 kg (243 lb)   08/27/13 114.9 kg (253 lb 4.9 oz)   04/08/13 109.8 kg (242 lb)   12/16/12 111.1 kg (245 lb)   12/14/12 110.8 kg (244 lb 4.3 oz)   03/17/12 108.4 kg (239 lb)   10/23/11 113.4 kg (250 lb)   08/04/11 114.5 kg (252 lb 6.4 oz)   01/06/11 117.9 kg (260 lb)   12/29/10 117.9 kg (260 lb)       Anthropometrics:   Height: 5' 6\" (167.6 cm) Weight: 108 kg (238 lb)   IBW (%IBW):   ( ) UBW (%UBW):   (  %)   Last Weight Metrics:  Weight Loss Metrics 12/30/2019 9/1/2019 5/29/2019 4/16/2019 2/21/2019 1/29/2019 12/24/2018   Today's Wt 238 lb 244 lb 11.4 oz 257 lb 252 lb 252 lb 262 lb 254 lb 10.1 oz   BMI 38.41 kg/m2 39.5 kg/m2 41.48 kg/m2 40.06 kg/m2 40.06 kg/m2 42.29 kg/m2 41.1 kg/m2       BMI: Body mass index is 38.41 kg/m². This BMI is indicative of:   []Underweight    []Normal    []Overweight    [x] Obesity   [] Extreme Obesity (BMI>40)     Estimated Nutrition Needs (Based on):   2000 Kcals/day(BMR: 1825 x 1.1) , 85 g Protein  Carbohydrate: At Least 130 g/day  Fluids: 2000 mL/day (1ml/kcal) or per primary team    NUTRITION DIAGNOSES:   Problem:  Altered nutrition-related lab values      Etiology: related to DM     Signs/Symptoms: as evidenced by -452       NUTRITION INTERVENTIONS:  Meals/Snacks: General/healthful diet                  GOAL:   stabilize BG<180 mg/dL    LEARNING NEEDS (Diet, Food/Nutrient-Drug Interaction):    [x] None Identified   [] Identified and Education Provided/Documented   [] Identified and Pt declined/was not appropriate     Cultureal, Cheondoism, OR Ethnic Dietary Needs:    [x] None Identified   [] Identified and Addressed     [x] Interdisciplinary Care Plan Reviewed/Documented    [x] Discharge Planning: Consistent carb diet      MONITORING /EVALUATION:      Food/Nutrient Intake Outcomes:  Total energy intake  Physical Signs/Symptoms Outcomes: Weight/weight change, Electrolyte and renal profile, Glucose profile, GI    NUTRITION RISK:    [] High              [] Moderate           []  Low  [x]  Minimal/Uncompromised    PT SEEN FOR:    []  MD Consult: []Calorie Count      []Diabetic Diet Education        []Diet Education     []Electrolyte Management     []General Nutrition Management and Supplements     []Management of Tube Feeding     []TPN Recommendations    []  RN Referral:  []MST score >=2     []Enteral/Parenteral Nutrition PTA     []Pregnant: Gestational DM or Multigestation     []Pressure Ulcer/Wound Care needs        []  Low BMI  [x]  LOS Referral       Sudhir Galvez RDN  Pager [phone]  Weekend Pager [phone]

## 2020-01-09 NOTE — PROGRESS NOTES
NEUROLOGY NOTE     Chief Complaint   Patient presents with    Pain (Chronic)     Patient arrives via EMS to triage c/o chronic pain to right arm and leg; recent eval at [facility] and given oxycodone       SUBJECTIVE:  No recurrence of symptoms  B12 is normal    HPI  The patient is a 66-year-old man who presents to the hospital on 12/30/2019. He came in because of worsening pain and swelling in the right ankle, elbow and knee. Patient was admitted because of acute body arthritis and was started on prednisone along with colchicine. Rheumatology was consulted and the work-up is in progress. Patient is on high-dose IV steroids. Neurology has been consulted as he was having left facial numbness along with chest pain. Patient states that on 1/7/2020, he was sitting and all of a sudden he started having left-sided chest pain which was pounding and pressure kind and after that he started noticing numbness on the left side of the face. It lasted for around 2 to 3 hours and then resolved. There was no problem with speech. There was no numbness or weakness of the upper or lower extremities. EKG was normal.  For work-up, patient did have a CT scan of the head and also MRI of the brain and both of them have been normal.    Patient's Lyme antibodies are negative, rheumatoid factor is normal, uric acid is elevated and HbA1c 6.9. Neurology has been consulted for possibility of stroke.     ROS  A ten system review of constitutional, cardiovascular, respiratory, musculoskeletal, endocrine, skin, SHEENT, genitourinary, psychiatric and neurologic systems was obtained and is unremarkable except as stated in HPI     PMH  Past Medical History:   Diagnosis Date    Arthritis     Chronic pain     Diabetes ([facility] [address]     GERD (gastroesophageal reflux disease)     Gout     Hypertension     Other ill-defined conditions(799.89)     gout       FH  Family History   Problem Relation Age of Onset    Hypertension Mother     Diabetes Mother     Heart Disease Father        Fernando Ignacio  Social History     Socioeconomic History    Marital status: LEGALLY      Spouse name: Not on file    Number of children: Not on file    Years of education: Not on file    Highest education level: Not on file   Tobacco Use    Smoking status: Never Smoker    Smokeless tobacco: Never Used   Substance and Sexual Activity    Alcohol use: Yes     Comment: occassionally \"weekends\"    Drug use: No    Sexual activity: Not Currently       ALLERGIES  Allergies   Allergen Reactions    Uloric [Febuxostat] Anaphylaxis    Allopurinol Hives       PHYSICAL EXAMINATION:   Patient Vitals for the past 24 hrs:   Temp Pulse Resp BP SpO2   01/09/20 1133 97.6 °F (36.4 °C) 65 18 162/89 97 %   01/09/20 0835 98.3 °F (36.8 °C) 84 18 148/66 97 %   01/08/20 2307 97.9 °F (36.6 °C) 72 18 142/85 98 %   01/08/20 1923 98.6 °F (37 °C) 83 18 140/71 98 %        General:   General appearance: Pt is in no acute distress   Distal pulses are preserved    Neurological Examination:   Mental Status:  AAO x3. Speech is fluent. Follows commands, has normal fund of knowledge, attention, short term recall, comprehension and insight. Cranial Nerves: Visual fields are full. PERRL, Extraocular movements are full. Facial sensation intact. Facial movement intact. Hearing intact to conversation. Palate elevates symmetrically. Shoulder shrug symmetric. Tongue midline. Motor: Strength is 5/5 except RLE is 4/5 secondary to pain. Normal tone. No atrophy. Sensation: Light touch - Normal    Reflexes: DTRs 2+ throughout. Plantar responses downgoing. Coordination/Cerebellar: Intact to finger-nose-finger     Gait: deferred    Skin: No significant bruising or lacerations.     LAB DATA REVIEWED:    Recent Results (from the past 24 hour(s))   GLUCOSE, POC    Collection Time: 01/08/20  4:12 PM   Result Value Ref Range    Glucose (POC) 348 (H) 65 - 100 mg/dL    Performed by Dayron Dalton (PCT)    VITAMIN B12 & FOLATE    Collection Time: 01/08/20  8:05 PM   Result Value Ref Range    Vitamin B12 468 193 - 986 pg/mL    Folate 8.9 5.0 - 21.0 ng/mL   LIPID PANEL    Collection Time: 01/08/20  8:05 PM   Result Value Ref Range    LIPID PROFILE          Cholesterol, total 202 (H) <200 MG/DL    Triglyceride 163 (H) <150 MG/DL    HDL Cholesterol 99 MG/DL    LDL, calculated 70.4 0 - 100 MG/DL    VLDL, calculated 32.6 MG/DL    CHOL/HDL Ratio 2.0 0.0 - 5.0     GLUCOSE, POC    Collection Time: 01/08/20  9:30 PM   Result Value Ref Range    Glucose (POC) 421 (H) 65 - 100 mg/dL    Performed by Louisa ALEJANDRE    GLUCOSE, POC    Collection Time: 01/09/20  1:53 AM   Result Value Ref Range    Glucose (POC) 318 (H) 65 - 100 mg/dL    Performed by Anyi Jimenez    GLUCOSE, POC    Collection Time: 01/09/20  7:22 AM   Result Value Ref Range    Glucose (POC) 199 (H) 65 - 100 mg/dL    Performed by Keri Hampton (PCT)    DUPLEX CAROTID BILATERAL    Collection Time: 01/09/20  8:38 AM   Result Value Ref Range    Right subclavian sys 105.4 cm/s    RIGHT SUBCLAVIAN ARTERY D 0.00 cm/s    Right cca dist sys 104.9 cm/s    Right CCA dist irizarry 13.5 cm/s    Right CCA prox sys 125.4 cm/s    Right CCA prox irizarry 16.7 cm/s    Right ICA dist sys 56.5 cm/s    Right ICA dist irizarry 15.9 cm/s    Right ICA mid sys 48.3 cm/s    Right ICA mid irizarry 16.0 cm/s    Right ICA prox sys 40.8 cm/s    Right ICA prox irizarry 7.9 cm/s    Right eca sys 91.9 cm/s    RIGHT EXTERNAL CAROTID ARTERY D 0.00 cm/s    Right vertebral sys 48.2 cm/s    RIGHT VERTEBRAL ARTERY D 13.08 cm/s    Right ICA/CCA sys 0.5     Left subclavian sys 110.3 cm/s    LEFT SUBCLAVIAN ARTERY D 0.00 cm/s    Left CCA dist sys 109.1 cm/s    Left CCA dist irizarry 0.0 cm/s    Left CCA prox sys 155.5 cm/s    Left CCA prox irizarry 18.5 cm/s    Left ICA dist sys 44.5 cm/s    Left ICA dist irizarry 13.0 cm/s    Left ICA mid sys 66.8 cm/s    Left ICA mid irizarry 11.7 cm/s    Left ICA prox sys 57.9 cm/s    Left ICA prox irizarry 5.8 cm/s Left ECA sys 91.8 cm/s    LEFT EXTERNAL CAROTID ARTERY D 0.00 cm/s    Left vertebral sys 54.6 cm/s    LEFT VERTEBRAL ARTERY D 15.33 cm/s    Left ICA/CCA sys 0.61    GLUCOSE, POC    Collection Time: 20 11:36 AM   Result Value Ref Range    Glucose (POC) 288 (H) 65 - 100 mg/dL    Performed by Demond Canada ([facility])         HOME MEDS  Prior to Admission Medications   Prescriptions Last Dose Informant Patient Reported? Taking? NIFEdipine ER (ADALAT CC) 90 mg ER tablet 2019 at Unknown time Self Yes Yes   Sig: Take 90 mg by mouth daily. amLODIPine (NORVASC) 10 mg tablet 2019 at Unknown time Self Yes Yes   Sig: Take 10 mg by mouth daily. aspirin 81 mg chewable tablet 2019 at Unknown time Self Yes Yes   Sig: Take 81 mg by mouth daily. atorvastatin (LIPITOR) 10 mg tablet 2019 at Unknown time Self Yes Yes   Sig: Take 10 mg by mouth daily. colchicine 0.6 mg tablet 2019 at Unknown time Self Yes Yes   Sig: Take 0.6 mg by mouth daily. dulaglutide (TRULICITY) 1.5 DS/4.6 mL sub-q pen 2019 at Unknown time Self Yes Yes   Si.5 mg by SubCUTAneous route every seven (7) days. gabapentin (NEURONTIN) 300 mg capsule 2019 at Unknown time Self Yes Yes   Sig: Take 300 mg by mouth three (3) times daily. indomethacin SR (INDOCIN SR) 75 mg SR capsule 2019 at Unknown time Self Yes Yes   Sig: Take 75 mg by mouth daily. irbesartan (AVAPRO) 300 mg tablet 2019 at Unknown time Self Yes Yes   Sig: Take 300 mg by mouth daily. melatonin 5 mg cap capsule 2019 at Unknown time Self Yes Yes   Sig: Take 5 mg by mouth nightly. metFORMIN (GLUCOPHAGE) 1,000 mg tablet 2019 at Unknown time Self Yes Yes   Sig: Take 1,000 mg by mouth two (2) times daily (with meals).    oxyCODONE IR (ROXICODONE) 5 mg immediate release tablet 2019 at Unknown time Self Yes Yes   Sig: Take 5 mg by mouth every six (6) hours as needed for Pain.   tamsulosin (FLOMAX) 0.4 mg capsule 12/29/2019 at Unknown time Self Yes Yes   Sig: Take 0.4 mg by mouth daily. traZODone (DESYREL) 50 mg tablet 12/29/2019 at Unknown time Self Yes Yes   Sig: Take 50 mg by mouth as needed for Sleep. Facility-Administered Medications: None       CURRENT MEDS  Current Facility-Administered Medications   Medication Dose Route Frequency    insulin NPH (NOVOLIN N, HUMULIN N) injection 25 Units  25 Units SubCUTAneous Q8H    And    methylPREDNISolone (PF) (SOLU-MEDROL) injection 125 mg  125 mg IntraVENous Q8H    metFORMIN (GLUCOPHAGE) tablet 1,000 mg  1,000 mg Oral BID WITH MEALS    NIFEdipine ER (PROCARDIA XL) tablet 90 mg  90 mg Oral DAILY    atorvastatin (LIPITOR) tablet 10 mg  10 mg Oral DAILY    gabapentin (NEURONTIN) capsule 300 mg  300 mg Oral TID    aspirin chewable tablet 81 mg  81 mg Oral DAILY    colchicine tablet 0.6 mg  0.6 mg Oral DAILY    tamsulosin (FLOMAX) capsule 0.4 mg  0.4 mg Oral DAILY    sodium chloride (NS) flush 5-40 mL  5-40 mL IntraVENous Q8H    docusate sodium (COLACE) capsule 100 mg  100 mg Oral BID    enoxaparin (LOVENOX) injection 40 mg  40 mg SubCUTAneous Q24H    insulin lispro (HUMALOG) injection   SubCUTAneous AC&HS    losartan (COZAAR) tablet 50 mg  50 mg Oral DAILY       Stroke workup    MRI Brain  Normal    Carotids:   Pending    Stroke labs:  HgBA1c    Lab Results   Component Value Date/Time    Hemoglobin A1c 6.9 (H) 12/30/2019 08:50 PM     LDL   Lab Results   Component Value Date/Time    LDL, calculated 70.4 01/08/2020 08:05 PM       IMPRESSION:  Latoya Soni is a 61 y.o. male who presents with joint pain and the work-up is in progress. He is on IV steroids and that is helping him significantly with the symptoms. Neurology was consulted because on 1/7/2020, he did have an episode of chest pain along with left facial numbness. There was no dysarthria and no involvement of the upper or lower extremities. There was no unilateral symptoms in the arms and the legs. Symptoms lasted for 2 to 3 hours and now the symptoms have resolved. Patient is asymptomatic at this time. Patient had CT scan of the head and MRI of the brain and they are normal.  Since it was just on the left facial numbness, the possibility of a stroke is less. I do not see any Bell's palsy either. Etiology is unclear. Vit B12 is normal. ACE level is pending. Will get carotid ultrasound and lipid panel. Will optimize risk factors for stroke. Patient does already have diabetes and hyperlipidemia and takes aspirin 81 mg at home along with Lipitor.           Shira Guy MD  Neurologist

## 2020-01-09 NOTE — PROGRESS NOTES
Hospitalist Progress Note    NAME: Nando Miller   :  1960   MRN:  [id number]       Assessment / Plan:    Acute polyarthritis  REEMA+  -Rt knee arthrocentesis w minimal fluid, no crystals, culture NGTD  -gout thought less likely, vs RA or ankylosing spondylitis  -HIV and Hep screen NR; lyme ab NEG; CCP ab NEG; uric acid 7.5, CRP > 10  -REEMA positive.    -hasn't been able to tolerate allopurinol or other urate lower meds   -failed NSAIDs outpt w indomethacin  -continue high dose IV steroids. He failed switch to prednisone earlier this week. -appreciate rheumatology input from Dr Dominic Morgan. Could patient have SLE? His CP earlier this week could be from pleurisy but CT did not demonstrate any pleural effusion. His EKG did not suggest pericarditis either. Episode of chest pain, resolved  -EKG no ischemic STTW changes. Troponin WNL. -CTA chest no PE or PNA    Left facial numbness, resolved  -MRI brain no acute stroke  -carotid US BL mild stenosis  -continue ASA, lipitor  -appreciate Neuro input    CKD 3  -Cr appears at baseline     HTN  -BP elevated likely due to pain  -Continue losartan and nifedipine. He is no longer on amlodipine  -hydralazine prn     T2DM, uncontrolled with hyperglycemia due to steroids  -A1C 6.9  -continue metformin. He is on Trulicity PTA  -restart NPH timed with solumedrol dose. Adjust NPH dose as needed  -SSI     Obesity /  Body mass index is 38.41 kg/m².     Code status: Full  Prophylaxis: Lovenox  Recommended Disposition: Home w/Family     Subjective:     Chief Complaint / Reason for Physician Visit  Follow up polyarthritis, DM, HTN  Discussed with RN events overnight. Joints still hurting.       Review of Systems:  Symptom Y/N Comments  Symptom Y/N Comments   Fever/Chills n   Chest Pain n    Poor Appetite    Edema     Cough n   Abdominal Pain n    Sputum n   Joint Pain y improved   SOB/CASSIDY n   Pruritis/Rash     Nausea/vomit n   Tolerating PT/OT     Diarrhea Tolerating Diet y    Constipation    Other       Could NOT obtain due to:      Objective:     VITALS:   Last 24hrs VS reviewed since prior progress note. Most recent are:  Patient Vitals for the past 24 hrs:   Temp Pulse Resp BP SpO2   01/09/20 1133 97.6 °F (36.4 °C) 65 18 162/89 97 %   01/09/20 0835 98.3 °F (36.8 °C) 84 18 148/66 97 %   01/08/20 2307 97.9 °F (36.6 °C) 72 18 142/85 98 %   01/08/20 1923 98.6 °F (37 °C) 83 18 140/71 98 %       Intake/Output Summary (Last 24 hours) at 1/9/2020 1251  Last data filed at 1/8/2020 2355  Gross per 24 hour   Intake    Output 2850 ml   Net -2850 ml        PHYSICAL EXAM:  General: WD, WN. Alert, cooperative, no acute distress    EENT:  EOMI. Anicteric sclerae. MMM  Resp:  CTA bilaterally, no wheezing or rales. No accessory muscle use  CV:  Regular  rhythm,  No edema  GI:  Soft, Non distended, Non tender.  +Bowel sounds  Neurologic:  Alert and oriented X 3, normal speech,   Psych:   Good insight. Not anxious nor agitated  Skin:  No rashes. No jaundice    Reviewed most current lab test results and cultures  YES  Reviewed most current radiology test results   YES  Review and summation of old records today    NO  Reviewed patient's current orders and MAR    YES  PMH/SH reviewed - no change compared to H&P  ________________________________________________________________________  Care Plan discussed with:    Comments   Patient x    Family      RN x    Care Manager     Consultant                        Multidiciplinary team rounds were held today with , nursing, pharmacist and clinical coordinator. Patient's plan of care was discussed; medications were reviewed and discharge planning was addressed.      ________________________________________________________________________  Total NON critical care TIME:  30   Minutes    Total CRITICAL CARE TIME Spent:   Minutes non procedure based      Comments   >50% of visit spent in counseling and coordination of care x ________________________________________________________________________  Rogelio Pate MD     Procedures: see electronic medical records for all procedures/Xrays and details which were not copied into this note but were reviewed prior to creation of Plan. LABS:  I reviewed today's most current labs and imaging studies. Pertinent labs include:  No results for input(s): WBC, HGB, HCT, PLT, HGBEXT, HCTEXT, PLTEXT, HGBEXT, HCTEXT, PLTEXT in the last 72 hours.   Recent Labs     01/08/20  0320      K 4.4      CO2 30   *   BUN 28*   CREA 1.29   CA 8.8   MG 2.6*       Signed: Rogelio Pate MD

## 2020-01-09 NOTE — PROGRESS NOTES
Attempted to see pt this pm and pt declined, stated he was sick last night and to weak to do therapy today. Will continue to follow and tx when pt is able.

## 2020-01-09 NOTE — PROGRESS NOTES
MD Paged. Patient's evening BG was 421. Received orders for coverage and to recheck BG in 2 hours. Will page MD with results. 1:55 AM Patient's BG is now 318. MD notified.

## 2020-01-09 NOTE — PROGRESS NOTES
Telehospitalist: Paged by RN as follows- Patient's evening blood glucose was 421. Per Humalog order if BG greater that 350, page MD for coverage. Patient will be getting 25 units NPH with his 22:00 Solu-medrol. Plan: Spoke with nurse. Give SSI now and recheck bld glucose in 2 hours. NPH started for acute hyperglycemia and given with Solumedrol dose per RN.  Have requested RN to please page with bld glc results once POC glc done

## 2020-01-09 NOTE — PROGRESS NOTES
Problem: Falls - Risk of  Goal: *Absence of Falls  Description  Document Edgar Brunner Fall Risk and appropriate interventions in the flowsheet.   Outcome: Progressing Towards Goal  Note: Fall Risk Interventions:  Mobility Interventions: OT consult for ADLs, Patient to call before getting OOB, PT Consult for mobility concerns, PT Consult for assist device competence         Medication Interventions: Patient to call before getting OOB    Elimination Interventions: Call light in reach, Patient to call for help with toileting needs, Urinal in reach

## 2020-01-09 NOTE — PROGRESS NOTES
PAULINA: [facility]  1) insurance auth obtained  2) pending medical stability    JENNIFER spoke to Madelyn with [facility] who reported that they have received insurance auth. JENNIFER contacted Dr. Jayesh Thomas who reported that patient is not medically stable for d/c at this time. MD mentioned patient having to go back on IV steroids. JENNIFER made Dario with [facility] aware. Madelyn spoke with [facility] facility who reported that they can likely accept patient with IV steroids and have their doctor follow patient and taper steroids when medically appropriate. JENNIFER has relayed this information to Dr. Jayesh Thomas who reported that he would look into it and consult with the other doctors involved in patient's care.      Teofilo HernandezeldaLens, [address]

## 2020-01-09 NOTE — PROGRESS NOTES
Bedside and Verbal shift change report given to Efren DAVE RN (oncoming nurse) by Saba Lee (offgoing nurse). Report included the following information SBAR, Kardex, Procedure Summary, Intake/Output, MAR, Recent Results and Med Rec Status.

## 2020-01-09 NOTE — PROGRESS NOTES
Bedside shift change report given to Barbara RN (oncoming nurse) by JAYLEN carcamo RN (offgoing nurse). Report included the following information SBAR, Kardex, Intake/Output, MAR and Recent Results.

## 2020-01-10 LAB
ACE SERPL-CCNC: 32 U/L (ref 14–82)
GLUCOSE BLD STRIP.AUTO-MCNC: 245 MG/DL (ref 65–100)
GLUCOSE BLD STRIP.AUTO-MCNC: 258 MG/DL (ref 65–100)
GLUCOSE BLD STRIP.AUTO-MCNC: 335 MG/DL (ref 65–100)
GLUCOSE BLD STRIP.AUTO-MCNC: 380 MG/DL (ref 65–100)
SERVICE CMNT-IMP: ABNORMAL

## 2020-01-10 PROCEDURE — 74011250637 HC RX REV CODE- 250/637: Performed by: INTERNAL MEDICINE

## 2020-01-10 PROCEDURE — 65270000029 HC RM PRIVATE

## 2020-01-10 PROCEDURE — 74011636637 HC RX REV CODE- 636/637: Performed by: INTERNAL MEDICINE

## 2020-01-10 PROCEDURE — 74011250636 HC RX REV CODE- 250/636: Performed by: INTERNAL MEDICINE

## 2020-01-10 PROCEDURE — 94760 N-INVAS EAR/PLS OXIMETRY 1: CPT

## 2020-01-10 PROCEDURE — 97110 THERAPEUTIC EXERCISES: CPT

## 2020-01-10 PROCEDURE — 82962 GLUCOSE BLOOD TEST: CPT

## 2020-01-10 PROCEDURE — 97116 GAIT TRAINING THERAPY: CPT

## 2020-01-10 RX ORDER — INSULIN GLARGINE 100 [IU]/ML
20 INJECTION, SOLUTION SUBCUTANEOUS ONCE
Status: COMPLETED | OUTPATIENT
Start: 2020-01-10 | End: 2020-01-10

## 2020-01-10 RX ADMIN — ASPIRIN 81 MG CHEWABLE TABLET 81 MG: 81 TABLET CHEWABLE at 09:37

## 2020-01-10 RX ADMIN — LOSARTAN POTASSIUM 50 MG: 50 TABLET, FILM COATED ORAL at 09:38

## 2020-01-10 RX ADMIN — ACETAMINOPHEN 650 MG: 160 SUSPENSION ORAL at 12:34

## 2020-01-10 RX ADMIN — ENOXAPARIN SODIUM 40 MG: 40 INJECTION SUBCUTANEOUS at 22:14

## 2020-01-10 RX ADMIN — INSULIN LISPRO 4 UNITS: 100 INJECTION, SOLUTION INTRAVENOUS; SUBCUTANEOUS at 22:13

## 2020-01-10 RX ADMIN — Medication 10 ML: at 22:14

## 2020-01-10 RX ADMIN — PREDNISONE 60 MG: 20 TABLET ORAL at 09:37

## 2020-01-10 RX ADMIN — TRAZODONE HYDROCHLORIDE 50 MG: 50 TABLET ORAL at 22:13

## 2020-01-10 RX ADMIN — GABAPENTIN 300 MG: 300 CAPSULE ORAL at 18:12

## 2020-01-10 RX ADMIN — TAMSULOSIN HYDROCHLORIDE 0.4 MG: 0.4 CAPSULE ORAL at 09:37

## 2020-01-10 RX ADMIN — NIFEDIPINE 90 MG: 90 TABLET, FILM COATED, EXTENDED RELEASE ORAL at 09:37

## 2020-01-10 RX ADMIN — METFORMIN HYDROCHLORIDE 1000 MG: 500 TABLET ORAL at 18:12

## 2020-01-10 RX ADMIN — GABAPENTIN 300 MG: 300 CAPSULE ORAL at 09:37

## 2020-01-10 RX ADMIN — METFORMIN HYDROCHLORIDE 1000 MG: 500 TABLET ORAL at 09:37

## 2020-01-10 RX ADMIN — INSULIN LISPRO 10 UNITS: 100 INJECTION, SOLUTION INTRAVENOUS; SUBCUTANEOUS at 18:12

## 2020-01-10 RX ADMIN — INSULIN GLARGINE 20 UNITS: 100 INJECTION, SOLUTION SUBCUTANEOUS at 18:13

## 2020-01-10 RX ADMIN — INSULIN LISPRO 3 UNITS: 100 INJECTION, SOLUTION INTRAVENOUS; SUBCUTANEOUS at 12:33

## 2020-01-10 RX ADMIN — Medication 10 ML: at 14:00

## 2020-01-10 RX ADMIN — COLCHICINE 0.6 MG: 0.6 TABLET, FILM COATED ORAL at 09:37

## 2020-01-10 RX ADMIN — ATORVASTATIN CALCIUM 10 MG: 10 TABLET, FILM COATED ORAL at 09:37

## 2020-01-10 RX ADMIN — INSULIN LISPRO 5 UNITS: 100 INJECTION, SOLUTION INTRAVENOUS; SUBCUTANEOUS at 09:36

## 2020-01-10 NOTE — PROGRESS NOTES
Problem: Mobility Impaired (Adult and Pediatric)  Goal: *Acute Goals and Plan of Care (Insert Text)  Description  FUNCTIONAL STATUS PRIOR TO ADMISSION: Patient was independent and active without use of DME. Has been bed bound for the past 3 weeks secondary to R knee pain, R ankle and R elbow. Was previously able to ambulate with crutches when pain in knee first began. HOME SUPPORT PRIOR TO ADMISSION: The patient lived with wife but did not require assist prior to issue. Family assisted him after he was unable to tolerate walking but they work during the day. Physical Therapy Goals  Revised 1/8/2020  1. Patient will transfer from bed to chair and chair to bed with modified independence using the least restrictive device within 7 day(s). 2.  Patient will perform sit to stand with modified independence within 7 day(s). 3.  Patient will ambulate with modified independence for 150 feet with the least restrictive device within 7 day(s). Physical Therapy Goals  Initiated 1/2/2020  1. Patient will move from supine to sit and sit to supine  in bed with modified independence within 7 day(s). Met 1/8/19.  2.  Patient will transfer from bed to chair and chair to bed with modified independence using the least restrictive device within 7 day(s). Not met  3. Patient will perform sit to stand with modified independence within 7 day(s). Not met  4. Patient will ambulate with modified independence for 20 feet with the least restrictive device within 7 day(s). Not met      Note:   PHYSICAL THERAPY TREATMENT  Patient: Jocy Sanchez (6 y.o. male)  Date: 1/10/2020  Diagnosis: Acute infective polyarthritis ([facility] [address] [M00.9]  Gout attack [M10.9]  Gout flare [M10.9]  Acute gouty arthritis [M10.9]        Precautions: Fall  Chart, physical therapy assessment, plan of care and goals were reviewed.     ASSESSMENT  Patient continues with skilled PT services and is progressing towards goals, no LOB or SOB, used reg RW with brace on Rt wrist and did well, doing well with TTWB, vc's for safety and proper RW use. Current Level of Function Impacting Discharge (mobility/balance): Stand-by assistance/CGA X1         PLAN :  Patient continues to benefit from skilled intervention to address the above impairments. Continue treatment per established plan of care. to address goals. Recommendation for discharge: (in order for the patient to meet his/her long term goals)  Therapy 3 hours per day 5-7 days per week    This discharge recommendation:  Has been made in collaboration with the attending provider and/or case management    IF patient discharges home will need the following DME: rolling walker     OBJECTIVE DATA SUMMARY:     Critical Behavior:  Neurologic State: Alert, Appropriate for age  Orientation Level: Oriented X4  Cognition: Appropriate for age attention/concentration, Follows commands  Safety/Judgement: Fall prevention, Awareness of environment, Good awareness of safety precautions(RLE weakness)    Functional Mobility Training:  Bed Mobility:Pt sitting in chair on arrival.    Transfers:  Sit to Stand: Stand-by assistance  Stand to Sit: Stand-by assistance  Interventions: Verbal cues  Level of Assistance: Stand-by assistance    Balance:  Sitting: Intact; Without support  Sitting - Static: Good (unsupported)  Sitting - Dynamic: Not tested  Standing: With support  Standing - Static: Good;Constant support  Standing - Dynamic : Good;Constant support    Ambulation/Gait Training:  Distance (ft): 60 Feet (ft)  Assistive Device: Walker, rolling;Gait belt;Brace/Splint  Ambulation - Level of Assistance: Contact guard assistance  Gait Abnormalities: Antalgic;Decreased step clearance; Step to gait  Right Side Weight Bearing: As tolerated; Toe touch  Left Side Weight Bearing: Full  Base of Support: Shift to left  Stance: Right decreased  Speed/Patricia: Slow  Step Length: Left shortened    Therapeutic Exercises:   sitting  EXERCISE   Sets Reps   Active Active Assist   Passive   Comments   Ankle pumps 1 10 [x] [] [] bilat   Heel raises 1 10 [x] [] [] \"   Toe tap 1 10 [x] [] [] \"   Knee ext 1 10 [x] [] [] \"   Hip flex 1 10 [x] [] [] \"     Pain Rating: see flow sheet    Activity Tolerance: Fair    After treatment patient left in no apparent distress: Sitting in chair and Call bell within reach    COMMUNICATION/COLLABORATION:   The patients plan of care was discussed with: Registered Nurse    Xavier Steiner PTA   Time Calculation: 25 mins

## 2020-01-10 NOTE — PROGRESS NOTES
Hospitalist Progress Note    NAME: Jaron Edwards   :  1960   MRN:  [id number]       Assessment / Plan:    Acute polyarthritis  REEMA+  -Rt knee arthrocentesis w minimal fluid, no crystals, culture NGTD  -gout thought less likely, vs RA or ankylosing spondylitis  -HIV and Hep screen NR; lyme ab NEG; CCP ab NEG; uric acid 7.5, CRP > 10  -REEMA 1:320; Checking dsDNA, sm, rnp, Selin-2, scl 70, ssa, ssb - pending  -hasn't been able to tolerate allopurinol or other urate lower meds   -failed NSAIDs outpt w indomethacin  -switched back to prednisone today. He feels better  -DC planning to rehab. -appreciate rheumatology input from Dr Helen Lopez. Follow up as outpatient    Episode of chest pain, resolved  -EKG no ischemic STTW changes. Troponin WNL. -CTA chest no PE or PNA    Left facial numbness, resolved  -MRI brain no acute stroke  -carotid US BL mild stenosis  -continue ASA, lipitor  -appreciate Neuro input    CKD 3  -Cr appears at baseline     HTN  -BP elevated likely due to pain  -Continue losartan and nifedipine. He is no longer on amlodipine  -hydralazine prn     T2DM, uncontrolled with hyperglycemia due to steroids  -A1C 6.9  -continue metformin. He is on Trulicity PTA  -restart NPH timed with solumedrol dose. Adjust NPH dose as needed  -SSI     Obesity /  Body mass index is 38.41 kg/m².     Code status: Full  Prophylaxis: Lovenox  Recommended Disposition: Home w/Family     Subjective:     Chief Complaint / Reason for Physician Visit  Follow up polyarthritis, DM, HTN  Discussed with RN events overnight.    Feels better    Review of Systems:  Symptom Y/N Comments  Symptom Y/N Comments   Fever/Chills n   Chest Pain n    Poor Appetite    Edema     Cough n   Abdominal Pain n    Sputum n   Joint Pain y improved   SOB/CASSIDY n   Pruritis/Rash     Nausea/vomit n   Tolerating PT/OT     Diarrhea    Tolerating Diet y    Constipation    Other       Could NOT obtain due to:      Objective:     VITALS:   Last 24hrs VS reviewed since prior progress note. Most recent are:  Patient Vitals for the past 24 hrs:   Temp Pulse Resp BP SpO2   01/10/20 1101 98.7 °F (37.1 °C) 66 18 136/78 97 %   01/10/20 0740 98.2 °F (36.8 °C) 77 18 124/78 95 %   01/10/20 0333 98.4 °F (36.9 °C) 62 18 150/80 97 %   01/09/20 2007 98 °F (36.7 °C) 76 18 150/82 96 %       Intake/Output Summary (Last 24 hours) at 1/10/2020 1303  Last data filed at 1/10/2020 1011  Gross per 24 hour   Intake    Output 3250 ml   Net -3250 ml        PHYSICAL EXAM:  General: WD, WN. Alert, cooperative, no acute distress    EENT:  EOMI. Anicteric sclerae. MMM  Resp:  CTA bilaterally, no wheezing or rales. No accessory muscle use  CV:  Regular  rhythm,  No edema  GI:  Soft, Non distended, Non tender.  +Bowel sounds  Neurologic:  Alert and oriented X 3, normal speech,   Psych:   Good insight. Not anxious nor agitated  Skin:  No rashes. No jaundice    Reviewed most current lab test results and cultures  YES  Reviewed most current radiology test results   YES  Review and summation of old records today    NO  Reviewed patient's current orders and MAR    YES  PMH/SH reviewed - no change compared to H&P  ________________________________________________________________________  Care Plan discussed with:    Comments   Patient x    Family      RN x    Care Manager     Consultant                        Multidiciplinary team rounds were held today with , nursing, pharmacist and clinical coordinator. Patient's plan of care was discussed; medications were reviewed and discharge planning was addressed.      ________________________________________________________________________  Total NON critical care TIME:  30   Minutes    Total CRITICAL CARE TIME Spent:   Minutes non procedure based      Comments   >50% of visit spent in counseling and coordination of care x    ________________________________________________________________________  [place] Cost, MD     Procedures: see electronic medical records for all procedures/Xrays and details which were not copied into this note but were reviewed prior to creation of Plan. LABS:  I reviewed today's most current labs and imaging studies. Pertinent labs include:  No results for input(s): WBC, HGB, HCT, PLT, HGBEXT, HCTEXT, PLTEXT, HGBEXT, HCTEXT, PLTEXT in the last 72 hours.   Recent Labs     01/08/20  0320      K 4.4      CO2 30   *   BUN 28*   CREA 1.29   CA 8.8   MG 2.6*       Signed: Flaquita Jorge MD

## 2020-01-10 NOTE — PROGRESS NOTES
PAULINA: [facility]  1) insurance auth obtained  2) family to transport patient to [facility] at 12:00pm  3) call report [phone]    3:00pm  CM made room visit with patient who reported that family can transport patient at 12:00pm as his youngest daughter has a basketball game at 9:00am. CM contacted [facility] and spoke to Ck Olmedo in admissions to make her aware.     2:00pm  CM received return phone call from Ck Olmedo in admissions at [facility] who requested updated MD notes from today to present to their MD to ensure that they can still accept patient. CM spoke to MD during IDR who reported that he had written his note recently. MD approved for patient to d/c tomorrow. CM spoke to [facility] again who confirmed that they can still accept patient and can accept patient tomorrow. CM made room visit with patient to discuss transportation as patient would not qualify for stretcher transport. Patient agreed that he could transport via car and would call his family to see if they could transport him tomorrow. CM to follow up to receive time of transport. Medicare pt has received, reviewed, and signed 2nd IM letter informing them of their right to appeal the discharge. Signed copy has been placed on pt bedside chart. 12:00pm  MD spoke to  reporting that patient could likely d/c today vs tomorrow. CM attempted to contact Jg Breen, [facility] liaison x3 however no answer, CM left VM requesting a return phone call. CM also contacted [facility] facility x2 and left message requesting a return phone call. CM will continue to follow.      Carlos Holley, [address]

## 2020-01-10 NOTE — PROGRESS NOTES
Bedside and Verbal shift change report given to MANNY Kang (oncoming nurse) by Pallavi Curtis (offgoing nurse). Report included the following information SBAR, Kardex, Procedure Summary, Intake/Output, MAR, Recent Results and Med Rec Status.

## 2020-01-10 NOTE — PROGRESS NOTES
Orthopedic End of Shift Note    Bedside shift change report given to Mj Butt RN (oncoming nurse) by Carley Napier RN (offgoing nurse). Report included the following information SBAR, Kardex, ED Summary, Procedure Summary, Intake/Output, MAR, Accordion, Recent Results and Med Rec Status. POD#     Significant issues during shift: none    Issues for Physician to address: none    Activity This Shift  (check all that apply) [x] chair  [x] dangle   [x] bathroom  [] bedside commode [] hallway  [] bedrest   Nausea/Vomiting [] yes [x] no     Voiding Status [x] void [] Devlin [] I&O Cath   Bowel Movements [x] yes [] no     Foot Pumps or SCD [] yes [x] no    Ice Pack [] yes    [x] no    Incentive Spirometer [] yes [x] no Volume:      Telemetry Monitoring   [] yes [x] no Rhythm:   Supplemental O2 [] yes [x] no Sat off O2:   99%     .

## 2020-01-10 NOTE — PROGRESS NOTES
NEUROLOGY NOTE     Chief Complaint   Patient presents with    Pain (Chronic)     Patient arrives via EMS to triage c/o chronic pain to right arm and leg; recent eval at [facility] and given oxycodone       SUBJECTIVE:  No recurrence of symptoms  B12 is normal    HPI  The patient is a 70-year-old man who presents to the hospital on 12/30/2019. He came in because of worsening pain and swelling in the right ankle, elbow and knee. Patient was admitted because of acute body arthritis and was started on prednisone along with colchicine. Rheumatology was consulted and the work-up is in progress. Patient is on high-dose IV steroids. Neurology has been consulted as he was having left facial numbness along with chest pain. Patient states that on 1/7/2020, he was sitting and all of a sudden he started having left-sided chest pain which was pounding and pressure kind and after that he started noticing numbness on the left side of the face. It lasted for around 2 to 3 hours and then resolved. There was no problem with speech. There was no numbness or weakness of the upper or lower extremities. EKG was normal.  For work-up, patient did have a CT scan of the head and also MRI of the brain and both of them have been normal.    Patient's Lyme antibodies are negative, rheumatoid factor is normal, uric acid is elevated and HbA1c 6.9. Neurology has been consulted for possibility of stroke.     ROS  A ten system review of constitutional, cardiovascular, respiratory, musculoskeletal, endocrine, skin, SHEENT, genitourinary, psychiatric and neurologic systems was obtained and is unremarkable except as stated in HPI     PMH  Past Medical History:   Diagnosis Date    Arthritis     Chronic pain     Diabetes ([facility] [address]     GERD (gastroesophageal reflux disease)     Gout     Hypertension     Other ill-defined conditions(799.89)     gout       FH  Family History   Problem Relation Age of Onset    Hypertension Mother     Diabetes Mother     Heart Disease Father        Fernando Ignacio  Social History     Socioeconomic History    Marital status: LEGALLY      Spouse name: Not on file    Number of children: Not on file    Years of education: Not on file    Highest education level: Not on file   Tobacco Use    Smoking status: Never Smoker    Smokeless tobacco: Never Used   Substance and Sexual Activity    Alcohol use: Yes     Comment: occassionally \"weekends\"    Drug use: No    Sexual activity: Not Currently       ALLERGIES  Allergies   Allergen Reactions    Uloric [Febuxostat] Anaphylaxis    Allopurinol Hives       PHYSICAL EXAMINATION:   Patient Vitals for the past 24 hrs:   Temp Pulse Resp BP SpO2   01/10/20 1101 98.7 °F (37.1 °C) 66 18 136/78 97 %   01/10/20 0740 98.2 °F (36.8 °C) 77 18 124/78 95 %   01/10/20 0333 98.4 °F (36.9 °C) 62 18 150/80 97 %   01/09/20 2007 98 °F (36.7 °C) 76 18 150/82 96 %        General:   General appearance: Pt is in no acute distress   Distal pulses are preserved    Neurological Examination:   Mental Status:  AAO x3. Speech is fluent. Follows commands, has normal fund of knowledge, attention, short term recall, comprehension and insight. Cranial Nerves: Visual fields are full. PERRL, Extraocular movements are full. Facial sensation intact. Facial movement intact. Hearing intact to conversation. Palate elevates symmetrically. Shoulder shrug symmetric. Tongue midline. Motor: Strength is 5/5 except RLE is 4/5 secondary to pain. Normal tone. No atrophy. Sensation: Light touch - Normal    Reflexes: DTRs 2+ throughout. Plantar responses downgoing. Coordination/Cerebellar: Intact to finger-nose-finger     Gait: deferred    Skin: No significant bruising or lacerations.     LAB DATA REVIEWED:    Recent Results (from the past 24 hour(s))   GLUCOSE, POC    Collection Time: 01/09/20  4:56 PM   Result Value Ref Range    Glucose (POC) 369 (H) 65 - 100 mg/dL    Performed by Rashida Lyn (PCT)    GLUCOSE, POC    Collection Time: 20 10:41 PM   Result Value Ref Range    Glucose (POC) 296 (H) 65 - 100 mg/dL    Performed by Anyi Jimenez    GLUCOSE, POC    Collection Time: 01/10/20  7:46 AM   Result Value Ref Range    Glucose (POC) 258 (H) 65 - 100 mg/dL    Performed by [facility], POC    Collection Time: 01/10/20 11:06 AM   Result Value Ref Range    Glucose (POC) 245 (H) 65 - 100 mg/dL    Performed by [address] [place] [address]  Prior to Admission Medications   Prescriptions Last Dose Informant Patient Reported? Taking? NIFEdipine ER (ADALAT CC) 90 mg ER tablet 2019 at Unknown time Self Yes Yes   Sig: Take 90 mg by mouth daily. amLODIPine (NORVASC) 10 mg tablet 2019 at Unknown time Self Yes Yes   Sig: Take 10 mg by mouth daily. aspirin 81 mg chewable tablet 2019 at Unknown time Self Yes Yes   Sig: Take 81 mg by mouth daily. atorvastatin (LIPITOR) 10 mg tablet 2019 at Unknown time Self Yes Yes   Sig: Take 10 mg by mouth daily. colchicine 0.6 mg tablet 2019 at Unknown time Self Yes Yes   Sig: Take 0.6 mg by mouth daily. dulaglutide (TRULICITY) 1.5 GR/3.3 mL sub-q pen 2019 at Unknown time Self Yes Yes   Si.5 mg by SubCUTAneous route every seven (7) days. gabapentin (NEURONTIN) 300 mg capsule 2019 at Unknown time Self Yes Yes   Sig: Take 300 mg by mouth three (3) times daily. indomethacin SR (INDOCIN SR) 75 mg SR capsule 2019 at Unknown time Self Yes Yes   Sig: Take 75 mg by mouth daily. irbesartan (AVAPRO) 300 mg tablet 2019 at Unknown time Self Yes Yes   Sig: Take 300 mg by mouth daily. melatonin 5 mg cap capsule 2019 at Unknown time Self Yes Yes   Sig: Take 5 mg by mouth nightly. metFORMIN (GLUCOPHAGE) 1,000 mg tablet 2019 at Unknown time Self Yes Yes   Sig: Take 1,000 mg by mouth two (2) times daily (with meals).    oxyCODONE IR (ROXICODONE) 5 mg immediate release tablet 2019 at Unknown time Self Yes Yes   Sig: Take 5 mg by mouth every six (6) hours as needed for Pain.   tamsulosin (FLOMAX) 0.4 mg capsule 12/29/2019 at Unknown time Self Yes Yes   Sig: Take 0.4 mg by mouth daily. traZODone (DESYREL) 50 mg tablet 12/29/2019 at Unknown time Self Yes Yes   Sig: Take 50 mg by mouth as needed for Sleep. Facility-Administered Medications: None       CURRENT MEDS  Current Facility-Administered Medications   Medication Dose Route Frequency    traZODone (DESYREL) tablet 50 mg  50 mg Oral QHS    predniSONE (DELTASONE) tablet 60 mg  60 mg Oral DAILY WITH BREAKFAST    metFORMIN (GLUCOPHAGE) tablet 1,000 mg  1,000 mg Oral BID WITH MEALS    NIFEdipine ER (PROCARDIA XL) tablet 90 mg  90 mg Oral DAILY    atorvastatin (LIPITOR) tablet 10 mg  10 mg Oral DAILY    gabapentin (NEURONTIN) capsule 300 mg  300 mg Oral TID    aspirin chewable tablet 81 mg  81 mg Oral DAILY    colchicine tablet 0.6 mg  0.6 mg Oral DAILY    tamsulosin (FLOMAX) capsule 0.4 mg  0.4 mg Oral DAILY    sodium chloride (NS) flush 5-40 mL  5-40 mL IntraVENous Q8H    docusate sodium (COLACE) capsule 100 mg  100 mg Oral BID    enoxaparin (LOVENOX) injection 40 mg  40 mg SubCUTAneous Q24H    insulin lispro (HUMALOG) injection   SubCUTAneous AC&HS    losartan (COZAAR) tablet 50 mg  50 mg Oral DAILY       Stroke workup    MRI Brain  Normal    Carotids:   · There is mild stenosis in the right ICA (<50%). · The right vertebral is antegrade. · There is mild stenosis in the left ICA (<50%). · The left vertebral is antegrade. Stroke labs:  HgBA1c    Lab Results   Component Value Date/Time    Hemoglobin A1c 6.9 (H) 12/30/2019 08:50 PM     LDL   Lab Results   Component Value Date/Time    LDL, calculated 70.4 01/08/2020 08:05 PM       IMPRESSION:  Marly Marcus is a 61 y.o. male who presents with joint pain and the work-up is in progress.  Neurology was consulted because on 1/7/2020, he did have an episode of chest pain along with left facial numbness. There was no dysarthria and no involvement of the upper or lower extremities. There was no unilateral symptoms in the arms and the legs. Symptoms lasted for 2 to 3 hours and now the symptoms have resolved. Patient is asymptomatic and has not had recurrence since that time. Patient had CT scan of the head and MRI of the brain and they are normal.  Carotid studies are normal as well. Since he just had left facial numbness and no other symptom, the possibility of a stroke is less. I do not see any Bell's palsy either. Etiology is unclear. Vit B12 is normal. ACE level is pending. Patient does diabetes and hyperlipidemia and takes aspirin 81 mg at home along with Lipitor. Continue. No further garcia. Call with questions.            Suri Mtz MD  Neurologist

## 2020-01-11 VITALS
WEIGHT: 238 LBS | RESPIRATION RATE: 18 BRPM | TEMPERATURE: 98.3 F | HEART RATE: 79 BPM | HEIGHT: 66 IN | DIASTOLIC BLOOD PRESSURE: 75 MMHG | SYSTOLIC BLOOD PRESSURE: 141 MMHG | BODY MASS INDEX: 38.25 KG/M2 | OXYGEN SATURATION: 98 %

## 2020-01-11 LAB
C3 SERPL-MCNC: 146 MG/DL (ref 82–167)
C4 SERPL-MCNC: 31 MG/DL (ref 14–44)
DSDNA AB SER-ACNC: <1 IU/ML (ref 0–9)
ENA JO1 AB SER-ACNC: <0.2 AI (ref 0–0.9)
ENA RNP AB SER-ACNC: <0.2 AI (ref 0–0.9)
ENA SCL70 AB SER-ACNC: 0.2 AI (ref 0–0.9)
ENA SM AB SER-ACNC: <0.2 AI (ref 0–0.9)
ENA SS-A AB SER-ACNC: <0.2 AI (ref 0–0.9)
ENA SS-B AB SER-ACNC: <0.2 AI (ref 0–0.9)
GLUCOSE BLD STRIP.AUTO-MCNC: 179 MG/DL (ref 65–100)
GLUCOSE BLD STRIP.AUTO-MCNC: 245 MG/DL (ref 65–100)
SERVICE CMNT-IMP: ABNORMAL
SERVICE CMNT-IMP: ABNORMAL

## 2020-01-11 PROCEDURE — 82962 GLUCOSE BLOOD TEST: CPT

## 2020-01-11 PROCEDURE — 74011250637 HC RX REV CODE- 250/637: Performed by: INTERNAL MEDICINE

## 2020-01-11 PROCEDURE — 74011636637 HC RX REV CODE- 636/637: Performed by: INTERNAL MEDICINE

## 2020-01-11 PROCEDURE — 74011636320 HC RX REV CODE- 636/320

## 2020-01-11 RX ORDER — PREDNISONE 20 MG/1
60 TABLET ORAL
Qty: 90 TAB | Refills: 0 | Status: SHIPPED | OUTPATIENT
Start: 2020-01-11 | End: 2020-02-10

## 2020-01-11 RX ORDER — OXYCODONE HYDROCHLORIDE 5 MG/1
5 TABLET ORAL
Qty: 30 TAB | Refills: 0 | Status: SHIPPED | OUTPATIENT
Start: 2020-01-11 | End: 2020-01-18

## 2020-01-11 RX ORDER — GABAPENTIN 300 MG/1
300 CAPSULE ORAL
Qty: 30 CAP | Refills: 0 | Status: SHIPPED | OUTPATIENT
Start: 2020-01-11 | End: 2020-12-03

## 2020-01-11 RX ADMIN — ATORVASTATIN CALCIUM 10 MG: 10 TABLET, FILM COATED ORAL at 09:43

## 2020-01-11 RX ADMIN — GABAPENTIN 300 MG: 300 CAPSULE ORAL at 09:43

## 2020-01-11 RX ADMIN — TAMSULOSIN HYDROCHLORIDE 0.4 MG: 0.4 CAPSULE ORAL at 09:43

## 2020-01-11 RX ADMIN — NIFEDIPINE 90 MG: 90 TABLET, FILM COATED, EXTENDED RELEASE ORAL at 09:48

## 2020-01-11 RX ADMIN — PREDNISONE 60 MG: 20 TABLET ORAL at 09:43

## 2020-01-11 RX ADMIN — LOSARTAN POTASSIUM 50 MG: 50 TABLET, FILM COATED ORAL at 09:43

## 2020-01-11 RX ADMIN — COLCHICINE 0.6 MG: 0.6 TABLET, FILM COATED ORAL at 09:48

## 2020-01-11 RX ADMIN — INSULIN LISPRO 3 UNITS: 100 INJECTION, SOLUTION INTRAVENOUS; SUBCUTANEOUS at 12:27

## 2020-01-11 RX ADMIN — ASPIRIN 81 MG CHEWABLE TABLET 81 MG: 81 TABLET CHEWABLE at 09:43

## 2020-01-11 RX ADMIN — DOCUSATE SODIUM 100 MG: 100 CAPSULE, LIQUID FILLED ORAL at 09:43

## 2020-01-11 RX ADMIN — INSULIN LISPRO 2 UNITS: 100 INJECTION, SOLUTION INTRAVENOUS; SUBCUTANEOUS at 09:42

## 2020-01-11 RX ADMIN — METFORMIN HYDROCHLORIDE 1000 MG: 500 TABLET ORAL at 09:43

## 2020-01-11 NOTE — DISCHARGE SUMMARY
Hospitalist Discharge Summary     Patient ID:  Darcy Gasca  [id number]  61 y.o.  1960    PCP on record: Monse Maravilla MD    Admit date: 12/30/2019  Discharge date and time: 1/11/2020      DISCHARGE DIAGNOSIS:    Acute polyarthritis  REEMA+  Episode of chest pain, resolved  Left facial numbness, resolved  CKD 3  HTN  T2DM, uncontrolled with hyperglycemia due to steroids  Obesity /  Body mass index is 38.41 kg/m²      CONSULTATIONS:  IP CONSULT TO PRIMARY CARE PROVIDER  IP CONSULT TO RHEUMATOLOGY  IP CONSULT TO ORTHOPEDIC SURGERY    Excerpted HPI from H&P   CHIEF COMPLAINT: Right knee, elbow, ankle pain     HISTORY OF PRESENT ILLNESS:        Patient is a 61years old -American man with past medical history of gout presented to the emergency department due to worsening pain and swelling in the right ankle, elbow and knee. He reported that he went to [facility] last week for which she was given oxycodone and steroids in the ER but he was not discharged on any medications other than colchicine. He was told that he had arthrocentesis that time but he was never told the results. Patient also has a history of underlying hypertension for which he takes hydrochlorothiazide and ACE inhibitor. He does not smoke, drink alcohol or use illicit drugs. He also denies any history of STD and he is not sexually active right now.     In the ER, uric acid was obtained. I asked for orthopedic consult for arthrocentesis. ______________________________________________________________________  DISCHARGE SUMMARY/HOSPITAL COURSE:  for full details see H&P, daily progress notes, labs, consult notes.      Acute polyarthritis  REEMA+  -Rt knee arthrocentesis w minimal fluid, no crystals, culture NGTD  -HIV and Hep screen Nonreactive; lyme ab NEG; CCP ab NEG; uric acid 7.5, CRP > 10  -HLA-B27 NEG; RF <10  -REEMA 1:320; Checking dsDNA, sm, rnp, Selin-2, scl 70, ssa, ssb - pending  -hasn't been able to tolerate allopurinol or other urate lower meds   -failed NSAIDs outpt w indomethacin  -slowly improved with IV solumedrol. Now transitioned to prednisone 60mg daily. -patient is going to [facility]  -appreciate rheumatology input from Dr Nael Nelson. Follow up as outpatient     Episode of chest pain, resolved  -EKG no ischemic STTW changes. Troponin WNL. -CTA chest no PE or PNA  -etiology unclear. Not consistent with pleurisy or muscle strain . Resolved in a few hours. Anxiety?     Left facial numbness, resolved  -MRI brain no acute stroke  -carotid US BL mild stenosis  -continue ASA, lipitor. LDL 70  -appreciate Neuro input. Etiology unclear. Resolved in a couple of hours. Possibly anxiety.        CKD 3  -Cr stable     HTN  -Continue losartan and nifedipine. He is no longer on amlodipine     T2DM, uncontrolled with hyperglycemia due to steroids  -A1C 6.9  -continue metformin and trulicity  -initially needed NPH timed with IV solumedrol dose.    -continue with SSI prn while on prednisone     Obesity /  Body mass index is 38.41 kg/m²        _______________________________________________________________________  Patient seen and examined by me on discharge day. Pertinent Findings:  Gen:    Not in distress  Chest: Clear lungs  CVS:   Regular rhythm. No edema  Abd:  Soft, not distended, not tender  Neuro:  Alert, Oriented x 4, grossly non focal exam  _______________________________________________________________________  DISCHARGE MEDICATIONS:   Current Discharge Medication List      START taking these medications    Details   predniSONE (DELTASONE) 20 mg tablet Take 60 mg by mouth daily (with breakfast) for 30 days. Qty: 90 Tab, Refills: 0         CONTINUE these medications which have CHANGED    Details   gabapentin (NEURONTIN) 300 mg capsule Take 1 Cap by mouth nightly. Max Daily Amount: 300 mg.   Qty: 30 Cap, Refills: 0    Associated Diagnoses: Neuropathy      oxyCODONE IR (ROXICODONE) 5 mg immediate release tablet Take 1 Tab by mouth every six (6) hours as needed for Pain for up to 7 days. Max Daily Amount: 20 mg.  Qty: 30 Tab, Refills: 0    Associated Diagnoses: Acute pain of right knee         CONTINUE these medications which have NOT CHANGED    Details   atorvastatin (LIPITOR) 10 mg tablet Take 10 mg by mouth daily. dulaglutide (TRULICITY) 1.5 JF/0.4 mL sub-q pen 1.5 mg by SubCUTAneous route every seven (7) days. colchicine 0.6 mg tablet Take 0.6 mg by mouth daily. NIFEdipine ER (ADALAT CC) 90 mg ER tablet Take 90 mg by mouth daily. Refills: 0      melatonin 5 mg cap capsule Take 5 mg by mouth nightly. traZODone (DESYREL) 50 mg tablet Take 50 mg by mouth as needed for Sleep.      irbesartan (AVAPRO) 300 mg tablet Take 300 mg by mouth daily. tamsulosin (FLOMAX) 0.4 mg capsule Take 0.4 mg by mouth daily. aspirin 81 mg chewable tablet Take 81 mg by mouth daily. metFORMIN (GLUCOPHAGE) 1,000 mg tablet Take 1,000 mg by mouth two (2) times daily (with meals). STOP taking these medications       amLODIPine (NORVASC) 10 mg tablet Comments:   Reason for Stopping:         indomethacin SR (INDOCIN SR) 75 mg SR capsule Comments:   Reason for Stopping:               My Recommended Diet, Activity, Wound Care, and follow-up labs are listed in the patient's Discharge Insturctions which I have personally completed and reviewed.   Risk of deterioration: Low    Condition at Discharge:  Stable  _____________________________________________________________________    Disposition  IP Rehab  ____________________________________________________________________    Care Plan discussed with:   Patient, Family, RN, Care Manager, Consultant    ____________________________________________________________________    Code Status: Full Code  ____________________________________________________________________      Condition at Discharge: Stable  _____________________________________________________________________  Follow up with:   PCP : Timo Coles MD  Follow-up Information     Follow up With Specialties Details Why Contact Info    Shabbir Roles, DO Orthopedic Surgery  As needed for orthopedic care [address]  [phone]      Jaiden Prieto MD Rheumatology In 2 weeks  [address] [place] [address]  [phone]                Total time in minutes spent coordinating this discharge (includes going over instructions, follow-up, prescriptions, and preparing report for sign off to her PCP) :  35 minutes    Signed:  Hilda Mantilla MD

## 2020-01-11 NOTE — PROGRESS NOTES
Hospital to [address] [place] [address] Road                                                                        61 y.o.   male    [address]   Room: [facility]7/01    [facility] 3 ORTHOPEDICS  Unit Phone# :  [phone]      [address] [address]  Dept: [phone]  Loc: [phone]                    SITUATION     Admitted:  12/30/2019         Attending Provider:  Shobha Garcia MD       Consultations:  IP CONSULT TO PRIMARY CARE PROVIDER  IP CONSULT TO RHEUMATOLOGY  IP CONSULT TO ORTHOPEDIC SURGERY    PCP:  Gail Staley MD   [phone]    Treatment Team: Attending Provider: Shobha Garcia MD; Consulting Provider: Osmin Castañeda MD; Consulting Provider: Dominic James DO; Consulting Provider: Moy Lara MD; Care Manager: Radha Mathur Primary Nurse: Katherine Kruger    Admitting Dx:  Acute infective polyarthritis ([facility] [address] [M00.9]  Gout attack [M10.9]  Gout flare [M10.9]  Acute gouty arthritis [M10.9]       Principal Problem: <principal problem not specified>    * No surgery found * of      BY: * Surgery not found *             ON: * No surgery found *                  Code Status: Full Code                Advance Directives:   Advance Care Planning 12/31/2019   Patient's Healthcare Decision Maker is: Verbal statement (Legal Next of Kin remains as decision maker)   Confirm Advance Directive None   Patient Would Like to Complete Advance Directive No    (Send w/patient)   No Doesnt Have       Isolation:  There are currently no Active Isolations       MDRO: No current active infections    Pain Medications given:         Special Equipment needed: no  Type of equipment:      (Not currently on dialysis)  (Not currently on dialysis)  (Not currently on dialysis)     BACKGROUND     Allergies:   Allergies   Allergen Reactions    Uloric [Febuxostat] Anaphylaxis    Allopurinol Hives       Past Medical History: Diagnosis Date    Arthritis     Chronic pain     Diabetes ([address]     GERD (gastroesophageal reflux disease)     Gout     Hypertension     Other ill-defined conditions(799.89)     gout       Past Surgical History:   Procedure Laterality Date    HX APPENDECTOMY      HX ORTHOPAEDIC      RIGHT knee scope       Medications Prior to Admission   Medication Sig    atorvastatin (LIPITOR) 10 mg tablet Take 10 mg by mouth daily.  amLODIPine (NORVASC) 10 mg tablet Take 10 mg by mouth daily.  dulaglutide (TRULICITY) 1.5 QA/0.2 mL sub-q pen 1.5 mg by SubCUTAneous route every seven (7) days.  indomethacin SR (INDOCIN SR) 75 mg SR capsule Take 75 mg by mouth daily.  colchicine 0.6 mg tablet Take 0.6 mg by mouth daily.  NIFEdipine ER (ADALAT CC) 90 mg ER tablet Take 90 mg by mouth daily.  melatonin 5 mg cap capsule Take 5 mg by mouth nightly.  traZODone (DESYREL) 50 mg tablet Take 50 mg by mouth as needed for Sleep.  irbesartan (AVAPRO) 300 mg tablet Take 300 mg by mouth daily.  tamsulosin (FLOMAX) 0.4 mg capsule Take 0.4 mg by mouth daily.  aspirin 81 mg chewable tablet Take 81 mg by mouth daily.  metFORMIN (GLUCOPHAGE) 1,000 mg tablet Take 1,000 mg by mouth two (2) times daily (with meals).  [DISCONTINUED] oxyCODONE IR (ROXICODONE) 5 mg immediate release tablet Take 5 mg by mouth every six (6) hours as needed for Pain.  [DISCONTINUED] gabapentin (NEURONTIN) 300 mg capsule Take 300 mg by mouth three (3) times daily. Hard scripts included in transfer packet yes    Vaccinations: There is no immunization history on file for this patient. Readmission Risks:    Known Risks: falls        The Charlson [employer] Index tool is an evidenced based tool that has more automatic generated information.  The tool looks at many different items such as the age of the patient, how many times they were admitted in the last calendar year, current length of stay in the hospital and their diagnosis. All of these items are pulled automatically from information documented in the chart from various places and will generate a score that predicts whether a patient is at low (less than 13), medium (13-20) or high (21 or greater) risk of being readmitted.         ASSESSMENT                Temp: 98.3 °F (36.8 °C) (01/11/20 1200) Pulse (Heart Rate): 79 (01/11/20 1200)     Resp Rate: 18 (01/11/20 1200)           BP: 141/75 (01/11/20 1200)     O2 Sat (%): 98 % (01/11/20 1200)     Weight: 108 kg (238 lb)    Height: 5' 6\" (167.6 cm) (12/30/19 1429)       If above not within 1 hour of discharge:    BP:_____  P:____  R:____ T:_____ O2 Sat: ___%  O2: ______    Active Orders   Diet    DIET DIABETIC WITH OPTIONS Consistent Carb 2400kcal; Regular         Orientation: oriented to time, place, person and situation     Active Behaviors: None                                   Active Lines/Drains:  (Peg Tube / [place] / CL or S/L?): no    Urinary Status: Voiding     Last BM: Last Bowel Movement Date: 01/10/20                   Mobility: Slightly limited   Weight Bearing Status: WBAT (Weight Bearing as Tolerated)      Gait Training  Assistive Device: Walker, rolling, Gait belt, Brace/Splint  Ambulation - Level of Assistance: Contact guard assistance  Distance (ft): 60 Feet (ft)         Lab Results   Component Value Date/Time    Glucose 249 (H) 01/08/2020 03:20 AM    Hemoglobin A1c 6.9 (H) 12/30/2019 08:50 PM    INR 1.0 04/16/2019 02:33 PM    INR 1.0 08/27/2013 09:17 AM    HGB 13.4 01/04/2020 05:02 AM    HGB 13.1 01/02/2020 02:31 AM        RECOMMENDATION     See After Visit Summary (AVS) for:  · Discharge instructions  · After [address]   · Special equipment needed (entered pre-discharge by Care Management)  · Medication Reconciliation    · Follow up Appointment(s)         Report given/sent by:  Mariely Ash                    Verbal report given to: Piotr           Estimated discharge time:  1/11/2020 at 1400

## 2020-01-11 NOTE — PROGRESS NOTES
Bedside and Verbal shift change report given to Flory Horowitz (oncoming nurse) by Camila Mark (offgoing nurse). Report included the following information SBAR, Kardex, Intake/Output, MAR, Accordion and Recent Results.

## 2020-01-11 NOTE — PROGRESS NOTES
PAULINA: [facility]  1) insurance auth obtained  2) family to transport patient to [facility] at 12:00pm  3) call report [phone]       CM acknowledges discharge order. Pt is to discharge to [facility] for IP Rehab with family to transport. Pt has been given 2nd IM letter. No further questions or CM needs identified at this time. CM faxed d/c summary as requested. CM to continue to remain available for support, discharge planning as needed.      Nelson Chavez, MSW, LSW  Supervisee in Social Work  [facility]  [phone]

## 2020-01-11 NOTE — DISCHARGE INSTRUCTIONS
HOSPITALIST DISCHARGE INSTRUCTIONS    NAME: Justen Last   :  1960   MRN:  [id number]     Date/Time:  2020 9:05 AM    ADMIT DATE: 2019   DISCHARGE DATE: 2020     Attending Physician: Bharath Augustine MD      Medications: Per above medication reconciliation. Pain Management: per above medications    Recommended diet: Diabetic Diet    Recommended activity: PT/OT Eval and Treat    Wound care: None    Indwelling devices:  None    Supplemental Oxygen: None    Required Lab work: BMP in a week    Glucose management:  Accucheck ACHS with sliding scale per SNF protocol    Code status: Full        Outside physician follow up: Follow-up Information     Follow up With Specialties Details Why Contact Info    Rom Alfaro DO Orthopedic Surgery  As needed for orthopedic care [address] [facility] [address] [place] [address]  Kerwin Bond MD Rheumatology In 2 weeks  [address] [place] [address]  [phone]                 Skilled nursing facility/ SNF MD responsible for above on discharge. Information obtained by :  I understand that if any problems occur once I am at home I am to contact my physician. I understand and acknowledge receipt of the instructions indicated above.                                                                                                                                            Physician's or R.N.'s Signature                                                                  Date/Time                                                                                                                                              Patient or Repres

## 2020-01-11 NOTE — PROGRESS NOTES
Bedside shift change report given to Cuca Amador RN (oncoming nurse) by Teresa Aguilera (offgoing nurse). Report included the following information SBAR, Kardex, ED Summary, Procedure Summary, Intake/Output, MAR, Accordion, Recent Results and Med Rec Status.

## 2020-01-13 LAB
BACTERIA SPEC CULT: NORMAL
BACTERIA SPEC CULT: NORMAL
GRAM STN SPEC: NORMAL
GRAM STN SPEC: NORMAL
SERVICE CMNT-IMP: NORMAL

## 2020-02-02 ENCOUNTER — HOSPITAL ENCOUNTER (EMERGENCY)
Age: 60
Discharge: HOME | End: 2020-02-03
Attending: EMERGENCY MEDICINE
Payer: MEDICARE

## 2020-02-02 ENCOUNTER — APPOINTMENT (OUTPATIENT)
Dept: CT IMAGING | Age: 60
End: 2020-02-02
Attending: EMERGENCY MEDICINE
Payer: MEDICARE

## 2020-02-02 DIAGNOSIS — R31.9 HEMATURIA, UNSPECIFIED TYPE: Primary | ICD-10-CM

## 2020-02-02 DIAGNOSIS — N40.0 ENLARGED PROSTATE: ICD-10-CM

## 2020-02-02 LAB
ALBUMIN SERPL-MCNC: 3.6 G/DL (ref 3.5–5)
ALBUMIN/GLOB SERPL: 1.2 {RATIO} (ref 1.1–2.2)
ALP SERPL-CCNC: 46 U/L (ref 45–117)
ALT SERPL-CCNC: 58 U/L (ref 12–78)
ANION GAP SERPL CALC-SCNC: 6 MMOL/L (ref 5–15)
APPEARANCE UR: CLEAR
AST SERPL-CCNC: 12 U/L (ref 15–37)
BACTERIA URNS QL MICRO: NEGATIVE /HPF
BASOPHILS # BLD: 0 K/UL (ref 0–0.1)
BASOPHILS NFR BLD: 0 % (ref 0–1)
BILIRUB SERPL-MCNC: 0.4 MG/DL (ref 0.2–1)
BILIRUB UR QL: NEGATIVE
BUN SERPL-MCNC: 28 MG/DL (ref 6–20)
BUN/CREAT SERPL: 20 (ref 12–20)
CALCIUM SERPL-MCNC: 9.2 MG/DL (ref 8.5–10.1)
CHLORIDE SERPL-SCNC: 105 MMOL/L (ref 97–108)
CO2 SERPL-SCNC: 27 MMOL/L (ref 21–32)
COLOR UR: ABNORMAL
CREAT SERPL-MCNC: 1.38 MG/DL (ref 0.7–1.3)
DIFFERENTIAL METHOD BLD: ABNORMAL
EOSINOPHIL # BLD: 0 K/UL (ref 0–0.4)
EOSINOPHIL NFR BLD: 0 % (ref 0–7)
EPITH CASTS URNS QL MICRO: ABNORMAL /LPF
ERYTHROCYTE [DISTWIDTH] IN BLOOD BY AUTOMATED COUNT: 14.1 % (ref 11.5–14.5)
GLOBULIN SER CALC-MCNC: 3.1 G/DL (ref 2–4)
GLUCOSE SERPL-MCNC: 91 MG/DL (ref 65–100)
GLUCOSE UR STRIP.AUTO-MCNC: NEGATIVE MG/DL
HCT VFR BLD AUTO: 41 % (ref 36.6–50.3)
HGB BLD-MCNC: 13.4 G/DL (ref 12.1–17)
HGB UR QL STRIP: ABNORMAL
HYALINE CASTS URNS QL MICRO: ABNORMAL /LPF (ref 0–5)
IMM GRANULOCYTES # BLD AUTO: 0.1 K/UL (ref 0–0.04)
IMM GRANULOCYTES NFR BLD AUTO: 1 % (ref 0–0.5)
KETONES UR QL STRIP.AUTO: ABNORMAL MG/DL
LEUKOCYTE ESTERASE UR QL STRIP.AUTO: NEGATIVE
LYMPHOCYTES # BLD: 2.5 K/UL (ref 0.8–3.5)
LYMPHOCYTES NFR BLD: 22 % (ref 12–49)
MCH RBC QN AUTO: 28.7 PG (ref 26–34)
MCHC RBC AUTO-ENTMCNC: 32.7 G/DL (ref 30–36.5)
MCV RBC AUTO: 87.8 FL (ref 80–99)
MONOCYTES # BLD: 0.8 K/UL (ref 0–1)
MONOCYTES NFR BLD: 7 % (ref 5–13)
NEUTS SEG # BLD: 8 K/UL (ref 1.8–8)
NEUTS SEG NFR BLD: 70 % (ref 32–75)
NITRITE UR QL STRIP.AUTO: NEGATIVE
NRBC # BLD: 0 K/UL (ref 0–0.01)
NRBC BLD-RTO: 0 PER 100 WBC
PH UR STRIP: 6 [PH] (ref 5–8)
PLATELET # BLD AUTO: 269 K/UL (ref 150–400)
PMV BLD AUTO: 8.9 FL (ref 8.9–12.9)
POTASSIUM SERPL-SCNC: 4.1 MMOL/L (ref 3.5–5.1)
PROT SERPL-MCNC: 6.7 G/DL (ref 6.4–8.2)
PROT UR STRIP-MCNC: NEGATIVE MG/DL
RBC # BLD AUTO: 4.67 M/UL (ref 4.1–5.7)
RBC #/AREA URNS HPF: ABNORMAL /HPF (ref 0–5)
SODIUM SERPL-SCNC: 138 MMOL/L (ref 136–145)
SP GR UR REFRACTOMETRY: 1.02 (ref 1–1.03)
UA: UC IF INDICATED,UAUC: ABNORMAL
UROBILINOGEN UR QL STRIP.AUTO: 1 EU/DL (ref 0.2–1)
WBC # BLD AUTO: 11.4 K/UL (ref 4.1–11.1)
WBC URNS QL MICRO: ABNORMAL /HPF (ref 0–4)

## 2020-02-02 PROCEDURE — 74011250636 HC RX REV CODE- 250/636: Performed by: EMERGENCY MEDICINE

## 2020-02-02 PROCEDURE — 80053 COMPREHEN METABOLIC PANEL: CPT

## 2020-02-02 PROCEDURE — 74177 CT ABD & PELVIS W/CONTRAST: CPT

## 2020-02-02 PROCEDURE — 36415 COLL VENOUS BLD VENIPUNCTURE: CPT

## 2020-02-02 PROCEDURE — 81001 URINALYSIS AUTO W/SCOPE: CPT

## 2020-02-02 PROCEDURE — 99284 EMERGENCY DEPT VISIT MOD MDM: CPT

## 2020-02-02 PROCEDURE — 74011250637 HC RX REV CODE- 250/637: Performed by: EMERGENCY MEDICINE

## 2020-02-02 PROCEDURE — 74011636320 HC RX REV CODE- 636/320: Performed by: EMERGENCY MEDICINE

## 2020-02-02 PROCEDURE — 85025 COMPLETE CBC W/AUTO DIFF WBC: CPT

## 2020-02-02 RX ORDER — SODIUM CHLORIDE 0.9 % (FLUSH) 0.9 %
10 SYRINGE (ML) INJECTION
Status: COMPLETED | OUTPATIENT
Start: 2020-02-02 | End: 2020-02-02

## 2020-02-02 RX ORDER — SODIUM CHLORIDE 0.9 % (FLUSH) 0.9 %
5-40 SYRINGE (ML) INJECTION AS NEEDED
Status: DISCONTINUED | OUTPATIENT
Start: 2020-02-02 | End: 2020-02-03 | Stop reason: HOSPADM

## 2020-02-02 RX ORDER — BARIUM SULFATE 20 MG/ML
900 SUSPENSION ORAL
Status: DISCONTINUED | OUTPATIENT
Start: 2020-02-02 | End: 2020-02-02

## 2020-02-02 RX ORDER — SODIUM CHLORIDE 0.9 % (FLUSH) 0.9 %
5-40 SYRINGE (ML) INJECTION EVERY 8 HOURS
Status: DISCONTINUED | OUTPATIENT
Start: 2020-02-02 | End: 2020-02-03 | Stop reason: HOSPADM

## 2020-02-02 RX ORDER — CEPHALEXIN 250 MG/1
500 CAPSULE ORAL
Status: COMPLETED | OUTPATIENT
Start: 2020-02-02 | End: 2020-02-02

## 2020-02-02 RX ADMIN — IOPAMIDOL 100 ML: 755 INJECTION, SOLUTION INTRAVENOUS at 23:02

## 2020-02-02 RX ADMIN — Medication 10 ML: at 22:36

## 2020-02-02 RX ADMIN — Medication 10 ML: at 23:02

## 2020-02-02 RX ADMIN — CEPHALEXIN 500 MG: 250 CAPSULE ORAL at 22:32

## 2020-02-02 RX ADMIN — SODIUM CHLORIDE 1000 ML: 900 INJECTION, SOLUTION INTRAVENOUS at 22:33

## 2020-02-03 VITALS
WEIGHT: 233.47 LBS | HEIGHT: 67 IN | SYSTOLIC BLOOD PRESSURE: 124 MMHG | DIASTOLIC BLOOD PRESSURE: 86 MMHG | HEART RATE: 98 BPM | RESPIRATION RATE: 18 BRPM | BODY MASS INDEX: 36.64 KG/M2 | OXYGEN SATURATION: 96 % | TEMPERATURE: 97.9 F

## 2020-02-03 RX ORDER — CEPHALEXIN 500 MG/1
500 CAPSULE ORAL 3 TIMES DAILY
Qty: 15 CAP | Refills: 0 | Status: SHIPPED | OUTPATIENT
Start: 2020-02-03 | End: 2020-02-03

## 2020-02-03 RX ORDER — CEPHALEXIN 500 MG/1
500 CAPSULE ORAL 3 TIMES DAILY
Qty: 15 CAP | Refills: 0 | Status: SHIPPED | OUTPATIENT
Start: 2020-02-03 | End: 2020-12-03

## 2020-02-03 NOTE — ED NOTES
Patient presents with c/o blood in his urine sudden onset 60 min PTA. 3130- Writer just collected urine for urinalysis, no blood noted in sample. 6404- Discharge instructions given to patient by Dr. Deniz Ramirez. Verbalized understanding of instructions. Patient discharged without difficulty. Patient discharged in stable condition ambulatory.

## 2020-02-03 NOTE — DISCHARGE INSTRUCTIONS
Patient Education        Blood in the Urine: Care Instructions  Your Care Instructions    Blood in the urine, or hematuria, may make the urine look red, brown, or pink. There may be blood every time you urinate or just from time to time. You cannot always see blood in the urine, but it will show up in a urine test.  Blood in the urine may be serious. It should always be checked by a doctor. Your doctor may recommend more tests, including an X-ray, a CT scan, or a cystoscopy (which lets a doctor look inside the urethra and bladder). Blood in the urine can be a sign of another problem. Common causes are bladder infections and kidney stones. An injury to your groin or your genital area can also cause bleeding in the urinary tract. Very hard exercise--such as running a marathon--can cause blood in the urine. Blood in the urine can also be a sign of kidney disease or cancer in the bladder or kidney. Many cases of blood in the urine are caused by a harmless condition that runs in families. This is called benign familial hematuria. It does not need any treatment. Sometimes your urine may look red or brown even though it does not contain blood. For example, not getting enough fluids (dehydration), taking certain medicines, or having a liver problem can change the color of your urine. Eating foods such as beets, rhubarb, or blackberries or foods with red food coloring can make your urine look red or pink. Follow-up care is a key part of your treatment and safety. Be sure to make and go to all appointments, and call your doctor if you are having problems. It's also a good idea to know your test results and keep a list of the medicines you take. When should you call for help? Call your doctor now or seek immediate medical care if:    · You have symptoms of a urinary infection. For example:  ? You have pus in your urine. ? You have pain in your back just below your rib cage. This is called flank pain. ?  You have a fever, chills, or body aches. ? It hurts to urinate. ? You have groin or belly pain.     · You have more blood in your urine.    Watch closely for changes in your health, and be sure to contact your doctor if:    · You have new urination problems.     · You do not get better as expected. Where can you learn more? Go to http://gi-jane.info/. Enter S226 in the search box to learn more about \"Blood in the Urine: Care Instructions. \"  Current as of: December 19, 2018  Content Version: 12.2  © 7883-2120 [employer]. Care instructions adapted under license by [employer] (which disclaims liability or warranty for this information). If you have questions about a medical condition or this instruction, always ask your healthcare professional. [address] any warranty or liability for your use of this information. Patient Education        Benign Prostatic Hyperplasia: Care Instructions  Your Care Instructions    Benign prostatic hyperplasia, or BPH, is an enlarged prostate gland. The prostate is a small gland that makes some of the fluid in semen. Prostate enlargement happens to almost all men as they age. It is usually not serious. BPH does not cause prostate cancer. As the prostate gets bigger, it may partly block the flow of urine. You may have a hard time getting a urine stream started or completely stopped. BPH can cause dribbling. You may have a weak urine stream, or you may have to urinate more often than you used to, especially at night. Most men find these problems easy to manage. You do not need treatment unless your symptoms bother you a lot or you have other problems, such as bladder infections or stones. In these cases, medicines may help. Surgery is not needed unless the urine flow is blocked or the symptoms do not get better with medicine. Follow-up care is a key part of your treatment and safety.  Be sure to make and go to all appointments, and call your doctor if you are having problems. It's also a good idea to know your test results and keep a list of the medicines you take. How can you care for yourself at home? · Take plenty of time to urinate. Try to relax. · Try \"double voiding. \" Urinate as much you can, relax for a few moments, and then try to urinate again. · Sit on the toilet to urinate. · Read or think of other things while you are waiting. · Turn on a faucet, or try to picture running water. Some men find that this helps get their urine flowing. · If dribbling is a problem, wash your penis daily to avoid skin irritation and infection. · Avoid caffeine and alcohol. These drinks will increase how often you need to urinate. Spread your fluid intake throughout the day. If the urge to urinate often wakes you at night, limit your fluid intake in the evening. Urinate right before you go to bed. · Many over-the-counter cold and allergy medicines can make the symptoms of BPH worse. Avoid antihistamines, decongestants, and allergy pills, if you can. Read the warnings on the package. · If you take any prescription medicines, especially tranquilizers or antidepressants, ask your doctor or pharmacist whether they can cause urination problems. There may be other medicines you can use that do not cause urinary problems. · Be safe with medicines. Take your medicines exactly as prescribed. Call your doctor if you think you are having a problem with your medicine. When should you call for help? Call your doctor now or seek immediate medical care if:    · You cannot urinate at all.     · You have symptoms of a urinary infection. For example:  ? You have blood or pus in your urine. ? You have pain in your back just below your rib cage. This is called flank pain. ? You have a fever, chills, or body aches. ? It hurts to urinate. ?  You have groin or belly pain.    Watch closely for changes in your health, and be sure to contact your doctor if:    · It hurts when you ejaculate.     · Your urinary problems get a lot worse or bother you a lot. Where can you learn more? Go to http://gi-jane.info/. Enter L214 in the search box to learn more about \"Benign Prostatic Hyperplasia: Care Instructions. \"  Current as of: May 28, 2019  Content Version: 12.2  © 7733-0668 [employer]. Care instructions adapted under license by [employer] (which disclaims liability or warranty for this information). If you have questions about a medical condition or this instruction, always ask your healthcare professional. [place] [address] any warranty or liability for your use of this information.

## 2020-02-09 NOTE — ED PROVIDER NOTES
EMERGENCY DEPARTMENT HISTORY AND PHYSICAL EXAM      Date: 2/2/2020  Patient Name: Carmenza Hussein    History of Presenting Illness     Chief Complaint   Patient presents with    Blood in Urine     patient reports he began passing a large amount of blood in his urine about an hour ago, no clots. denies abd. pain, n/v       History Provided By: Patient    HPI: Carmenza Hussein, 61 y.o. male presents to the ED with cc of hematuria. Pt recently had leg injury with strain, no recent fx or surgery. Pt states when urinating this morning he had gross hematuria. There was no pain with urination. He denies n/v/d or abd pain. There is no fever/chills. He denies any recent trauma. He denies any CP or SOA. Pt does not use and anti-coagulation other than baby ASA. There are no other complaints, changes, or physical findings at this time. PCP: Pablito Welch, DO    No current facility-administered medications on file prior to encounter. Current Outpatient Medications on File Prior to Encounter   Medication Sig Dispense Refill    gabapentin (NEURONTIN) 300 mg capsule Take 1 Cap by mouth nightly. Max Daily Amount: 300 mg. 30 Cap 0    predniSONE (DELTASONE) 20 mg tablet Take 60 mg by mouth daily (with breakfast) for 30 days. 90 Tab 0    atorvastatin (LIPITOR) 10 mg tablet Take 10 mg by mouth daily.  dulaglutide (TRULICITY) 1.5 CS/3.0 mL sub-q pen 1.5 mg by SubCUTAneous route every seven (7) days.  colchicine 0.6 mg tablet Take 0.6 mg by mouth daily.  NIFEdipine ER (ADALAT CC) 90 mg ER tablet Take 90 mg by mouth daily. 0    melatonin 5 mg cap capsule Take 5 mg by mouth nightly.  traZODone (DESYREL) 50 mg tablet Take 50 mg by mouth as needed for Sleep.  irbesartan (AVAPRO) 300 mg tablet Take 300 mg by mouth daily.  tamsulosin (FLOMAX) 0.4 mg capsule Take 0.4 mg by mouth daily.  aspirin 81 mg chewable tablet Take 81 mg by mouth daily.       metFORMIN (GLUCOPHAGE) 1,000 mg tablet Take 1,000 mg by mouth two (2) times daily (with meals). Past History     Past Medical History:  Past Medical History:   Diagnosis Date    Arthritis     Chronic pain     Diabetes ([address]     GERD (gastroesophageal reflux disease)     Gout     Hypertension     Other ill-defined conditions(799.89)     gout       Past Surgical History:  Past Surgical History:   Procedure Laterality Date    HX APPENDECTOMY      HX ORTHOPAEDIC      RIGHT knee scope       Family History:  Family History   Problem Relation Age of Onset    Hypertension Mother     Diabetes Mother     Heart Disease Father        Social History:  Social History     Tobacco Use    Smoking status: Never Smoker    Smokeless tobacco: Never Used   Substance Use Topics    Alcohol use: Yes     Comment: occassionally \"weekends\"    Drug use: No       Allergies: Allergies   Allergen Reactions    Uloric [Febuxostat] Anaphylaxis    Allopurinol Hives         Review of Systems   Review of Systems   Constitutional: Negative. Negative for appetite change, chills, fatigue and fever. HENT: Negative. Negative for congestion, rhinorrhea, sinus pressure and sore throat. Eyes: Negative. Respiratory: Negative. Negative for cough, choking, chest tightness, shortness of breath and wheezing. Cardiovascular: Negative. Negative for chest pain, palpitations and leg swelling. Gastrointestinal: Negative for abdominal pain, constipation, diarrhea, nausea and vomiting. Endocrine: Negative. Genitourinary: Positive for hematuria. Negative for difficulty urinating, dysuria, flank pain and urgency. Musculoskeletal: Negative. Skin: Negative. Neurological: Negative. Negative for dizziness, speech difficulty, weakness, light-headedness, numbness and headaches. Psychiatric/Behavioral: Negative. All other systems reviewed and are negative. Physical Exam   Physical Exam  Vitals signs and nursing note reviewed.    Constitutional:       General: He is not in acute distress. Appearance: He is well-developed. He is obese. He is not diaphoretic. HENT:      Head: Normocephalic and atraumatic. Mouth/Throat:      Pharynx: No oropharyngeal exudate. Eyes:      Conjunctiva/sclera: Conjunctivae normal.      Pupils: Pupils are equal, round, and reactive to light. Neck:      Musculoskeletal: Normal range of motion and neck supple. Vascular: No JVD. Trachea: No tracheal deviation. Cardiovascular:      Rate and Rhythm: Normal rate and regular rhythm. Heart sounds: Normal heart sounds. No murmur. Pulmonary:      Effort: Pulmonary effort is normal. No respiratory distress. Breath sounds: Normal breath sounds. No stridor. No wheezing or rales. Chest:      Chest wall: No tenderness. Abdominal:      General: There is no distension. Palpations: Abdomen is soft. Tenderness: There is abdominal tenderness (mild- suprapubic). There is no guarding or rebound. Musculoskeletal: Normal range of motion. General: No tenderness. Skin:     General: Skin is warm and dry. Capillary Refill: Capillary refill takes less than 2 seconds. Neurological:      Mental Status: He is alert and oriented to person, place, and time. Cranial Nerves: No cranial nerve deficit. Comments: No gross motor or sensory deficits    Psychiatric:         Mood and Affect: Mood normal.         Behavior: Behavior normal.         Diagnostic Study Results     Labs -     Contains abnormal data CBC WITH AUTOMATED DIFF (Final result)    Component (Lab Inquiry)   Collection Time Result Time WBC RBC HGB HCT MCV   02/02/20 22:30:00 02/02/20 22:36:23 11. 4High  4.67 13.4 41.0 87.8       Collection Time Result Time MCH MCHC RDW PLT MPLV   02/02/20 22:30:00 02/02/20 22:36:23 28.7 32.7 14.1 269 8.9       Collection Time Result Time NRBC ANRBC GRANS LYMPH MONOS   02/02/20 22:30:00 02/02/20 22:36:23 0.0 0.00 70 22 7       Collection Time Result Time EOS BASOS IG ABG ABL   02/02/20 22:30:00 02/02/20 22:36:23 0 0 1High  8.0 2.5       Collection Time Result Time ABM MIKE ABB AIG DF   02/02/20 22:30:00 02/02/20 22:36:23 0.8 0.0 0.0 0.1High  AUTOMATED       Final result                        Contains abnormal data METABOLIC PANEL, COMPREHENSIVE (Final result)    Component (Lab Inquiry)   Collection Time Result Time NA K CL CO2 AGAP   02/02/20 22:30:00 02/02/20 22:59:06 138 4.1 105 27 6       Collection Time Result Time GLU BUN CREA BUCR GFRAA   02/02/20 22:30:00 02/02/20 22:59:06 91 28High  1. 38High  20 >60       Collection Time Result Time GFRNA CA TBIL GPT SGOT   02/02/20 22:30:00 02/02/20 22:59:06 53  Estimated GFR is calcu. Gus Shillings Gus Shillings Low  9.2 0.4 58 12Low        Collection Time Result Time AP TP ALB GLOB AGRAT   02/02/20 22:30:00 02/02/20 22:59:06 46 6.7 3.6 3.1 1.2       Final result                        Contains abnormal data URINALYSIS W/ REFLEX CULTURE (Edited Result - FINAL)    Component (Lab Inquiry)   Collection Time Result Time COLOR APPRN REFSG NADIA PROTU   02/02/20 21:40:00 02/02/20 22:25:55 YELLOW/STRAW  Color Reference Range:. .. CLEAR 1.024 6.0 NEGATIVE        Collection Time Result Time GLUCU KETU BILU BLDU UROU   02/02/20 21:40:00 02/02/20 22:25:55 NEGATIVE  TRACEAbnormal  NEGATIVE  LARGEAbnormal  1.0       Collection Time Result Time HENRRY LEUKU WBCU RBCU EPSU   02/02/20 21:40:00 02/02/20 22:25:55 NEGATIVE  NEGATIVE  0-4  FEW  Epithelial cell catego. ..       Collection Time Result Time Leanor Grist HYCST   02/02/20 21:40:00 02/02/20 22:25:55 NEGATIVE   CORRECTED RESULT, NOTI. .. CULTURE NOT INDICATED BY UA RESULT  CORRECTED ON 02/02 AT . .. 0-2       Edited Result - FINAL                       Radiologic Studies -   CT ABD PELV W CONT   Final Result   IMPRESSION:    No acute abnormality in the abdomen or pelvis. Prostatomegaly.         CT Results  (Last 48 hours)    None        CXR Results  (Last 48 hours)    None          Medical Decision Making   I am the first provider for this patient. I reviewed the vital signs, available nursing notes, past medical history, past surgical history, family history and social history. Vital Signs-Reviewed the patient's vital signs. Records Reviewed: Nursing Notes, Old Medical Records, Previous Radiology Studies and Previous Laboratory Studies    Provider Notes (Medical Decision Making):   DDx- UTI, kidney stone, renal mass, bladder mass    ED Course:   Initial assessment performed. The patients presenting problems have been discussed, and they are in agreement with the care plan formulated and outlined with them. I have encouraged them to ask questions as they arise throughout their visit. Pt has had regular PSA checks and is aware of enlarged prostate. Discussed f/u Urology especially if hematuria persists for possible cysto to rule out other pathology. Critical Care Time:   None    Disposition:  DC home, f/u with Urology    PLAN:  1. Discharge Medication List as of 2/3/2020 12:37 AM      START taking these medications    Details   cephALEXin (KEFLEX) 500 mg capsule Take 1 Cap by mouth three (3) times daily. , Normal, Disp-15 Cap, R-0         CONTINUE these medications which have NOT CHANGED    Details   gabapentin (NEURONTIN) 300 mg capsule Take 1 Cap by mouth nightly. Max Daily Amount: 300 mg., Print, Disp-30 Cap, R-0      predniSONE (DELTASONE) 20 mg tablet Take 60 mg by mouth daily (with breakfast) for 30 days. , Normal, Disp-90 Tab, R-0      atorvastatin (LIPITOR) 10 mg tablet Take 10 mg by mouth daily. , Historical Med      dulaglutide (TRULICITY) 1.5 BG/6.8 mL sub-q pen 1.5 mg by SubCUTAneous route every seven (7) days. , Historical Med      colchicine 0.6 mg tablet Take 0.6 mg by mouth daily. , Historical Med      NIFEdipine ER (ADALAT CC) 90 mg ER tablet Take 90 mg by mouth daily. , Historical Med, R-0      melatonin 5 mg cap capsule Take 5 mg by mouth nightly., Historical Med      traZODone (DESYREL) 50 mg tablet Take 50 mg by mouth as needed for Sleep., Historical Med      irbesartan (AVAPRO) 300 mg tablet Take 300 mg by mouth daily. , Historical Med      tamsulosin (FLOMAX) 0.4 mg capsule Take 0.4 mg by mouth daily. , Historical Med      aspirin 81 mg chewable tablet Take 81 mg by mouth daily. , Historical Med      metFORMIN (GLUCOPHAGE) 1,000 mg tablet Take 1,000 mg by mouth two (2) times daily (with meals). , Historical Med           2. Follow-up Information     Follow up With Specialties Details Why Contact Info    Lou Soto DO Family Practice, Internal Medicine, Geriatric Medicine   [address]      Antonio Quach MD Urology   [address] [place] [address] [phone]          Return to ED if worse     Diagnosis     Clinical Impression:   1. Hematuria, unspecified type    2. Enlarged prostate        Attestations:    Vanessa Babb, DO    Please note that this dictation was completed with [email or web address], the computer voice recognition software. Quite often unanticipated grammatical, syntax, homophones, and other interpretive errors are inadvertently transcribed by the computer software. Please disregard these errors. Please excuse any errors that have escaped final proofreading. Thank you.

## 2020-03-09 ENCOUNTER — HOSPITAL ENCOUNTER (EMERGENCY)
Age: 60
Discharge: HOME | End: 2020-03-09
Attending: EMERGENCY MEDICINE
Payer: MEDICARE

## 2020-03-09 ENCOUNTER — APPOINTMENT (OUTPATIENT)
Dept: CT IMAGING | Age: 60
End: 2020-03-09
Attending: EMERGENCY MEDICINE
Payer: MEDICARE

## 2020-03-09 VITALS
BODY MASS INDEX: 37.04 KG/M2 | RESPIRATION RATE: 16 BRPM | HEART RATE: 94 BPM | SYSTOLIC BLOOD PRESSURE: 144 MMHG | TEMPERATURE: 98.1 F | DIASTOLIC BLOOD PRESSURE: 83 MMHG | OXYGEN SATURATION: 98 % | HEIGHT: 67 IN | WEIGHT: 236 LBS

## 2020-03-09 DIAGNOSIS — R51.9 LEFT TEMPORAL HEADACHE: ICD-10-CM

## 2020-03-09 DIAGNOSIS — R51.9 ACUTE NONINTRACTABLE HEADACHE, UNSPECIFIED HEADACHE TYPE: Primary | ICD-10-CM

## 2020-03-09 LAB
ALBUMIN SERPL-MCNC: 3.7 G/DL (ref 3.5–5)
ALBUMIN/GLOB SERPL: 1 {RATIO} (ref 1.1–2.2)
ALP SERPL-CCNC: 63 U/L (ref 45–117)
ALT SERPL-CCNC: 40 U/L (ref 12–78)
ANION GAP SERPL CALC-SCNC: 6 MMOL/L (ref 5–15)
AST SERPL-CCNC: 18 U/L (ref 15–37)
BASOPHILS # BLD: 0 K/UL (ref 0–0.1)
BASOPHILS NFR BLD: 0 % (ref 0–1)
BILIRUB SERPL-MCNC: 0.7 MG/DL (ref 0.2–1)
BUN SERPL-MCNC: 8 MG/DL (ref 6–20)
BUN/CREAT SERPL: 6 (ref 12–20)
CALCIUM SERPL-MCNC: 9.3 MG/DL (ref 8.5–10.1)
CHLORIDE SERPL-SCNC: 107 MMOL/L (ref 97–108)
CO2 SERPL-SCNC: 26 MMOL/L (ref 21–32)
CREAT SERPL-MCNC: 1.25 MG/DL (ref 0.7–1.3)
CRP SERPL HS-MCNC: >9.5 MG/L
DIFFERENTIAL METHOD BLD: ABNORMAL
EOSINOPHIL # BLD: 0 K/UL (ref 0–0.4)
EOSINOPHIL NFR BLD: 0 % (ref 0–7)
ERYTHROCYTE [DISTWIDTH] IN BLOOD BY AUTOMATED COUNT: 14.5 % (ref 11.5–14.5)
ERYTHROCYTE [SEDIMENTATION RATE] IN BLOOD: 24 MM/HR (ref 0–20)
GLOBULIN SER CALC-MCNC: 3.8 G/DL (ref 2–4)
GLUCOSE BLD STRIP.AUTO-MCNC: 179 MG/DL (ref 65–100)
GLUCOSE SERPL-MCNC: 176 MG/DL (ref 65–100)
HCT VFR BLD AUTO: 42 % (ref 36.6–50.3)
HGB BLD-MCNC: 13.8 G/DL (ref 12.1–17)
IMM GRANULOCYTES # BLD AUTO: 0.1 K/UL (ref 0–0.04)
IMM GRANULOCYTES NFR BLD AUTO: 1 % (ref 0–0.5)
LYMPHOCYTES # BLD: 2.2 K/UL (ref 0.8–3.5)
LYMPHOCYTES NFR BLD: 24 % (ref 12–49)
MCH RBC QN AUTO: 29.1 PG (ref 26–34)
MCHC RBC AUTO-ENTMCNC: 32.9 G/DL (ref 30–36.5)
MCV RBC AUTO: 88.6 FL (ref 80–99)
MONOCYTES # BLD: 0.9 K/UL (ref 0–1)
MONOCYTES NFR BLD: 10 % (ref 5–13)
NEUTS SEG # BLD: 5.8 K/UL (ref 1.8–8)
NEUTS SEG NFR BLD: 65 % (ref 32–75)
NRBC # BLD: 0 K/UL (ref 0–0.01)
NRBC BLD-RTO: 0 PER 100 WBC
PLATELET # BLD AUTO: 308 K/UL (ref 150–400)
PMV BLD AUTO: 9.7 FL (ref 8.9–12.9)
POTASSIUM SERPL-SCNC: 3.7 MMOL/L (ref 3.5–5.1)
PROT SERPL-MCNC: 7.5 G/DL (ref 6.4–8.2)
RBC # BLD AUTO: 4.74 M/UL (ref 4.1–5.7)
SERVICE CMNT-IMP: ABNORMAL
SODIUM SERPL-SCNC: 139 MMOL/L (ref 136–145)
WBC # BLD AUTO: 8.9 K/UL (ref 4.1–11.1)

## 2020-03-09 PROCEDURE — 86141 C-REACTIVE PROTEIN HS: CPT

## 2020-03-09 PROCEDURE — 99284 EMERGENCY DEPT VISIT MOD MDM: CPT

## 2020-03-09 PROCEDURE — 36415 COLL VENOUS BLD VENIPUNCTURE: CPT

## 2020-03-09 PROCEDURE — 82962 GLUCOSE BLOOD TEST: CPT

## 2020-03-09 PROCEDURE — 85025 COMPLETE CBC W/AUTO DIFF WBC: CPT

## 2020-03-09 PROCEDURE — 74011250637 HC RX REV CODE- 250/637: Performed by: EMERGENCY MEDICINE

## 2020-03-09 PROCEDURE — 85652 RBC SED RATE AUTOMATED: CPT

## 2020-03-09 PROCEDURE — 80053 COMPREHEN METABOLIC PANEL: CPT

## 2020-03-09 PROCEDURE — 70450 CT HEAD/BRAIN W/O DYE: CPT

## 2020-03-09 RX ORDER — IBUPROFEN 600 MG/1
600 TABLET ORAL ONCE
Status: COMPLETED | OUTPATIENT
Start: 2020-03-09 | End: 2020-03-09

## 2020-03-09 RX ORDER — ACETAMINOPHEN 500 MG
1000 TABLET ORAL ONCE
Status: COMPLETED | OUTPATIENT
Start: 2020-03-09 | End: 2020-03-09

## 2020-03-09 RX ADMIN — IBUPROFEN 600 MG: 600 TABLET, FILM COATED ORAL at 14:32

## 2020-03-09 RX ADMIN — ACETAMINOPHEN 1000 MG: 500 TABLET ORAL at 14:32

## 2020-03-09 NOTE — DISCHARGE INSTRUCTIONS
I am concerned about possible temporal arteritis. The ESR was minimally, but CRP was still pending. The patient does not have an elevation white blood cell count, his visual testing is unremarkable. CRP still pending, I think he will require reevaluation if he continues to have symptoms.

## 2020-03-09 NOTE — ED NOTES
Visual acuity screening completed, Left eye 20/40, right eye 20/30. Pt reports seeing eye doctor regularly and was seen within the last two weeks.

## 2020-03-09 NOTE — ED TRIAGE NOTES
C/o L otalgia and L sided headache since yesterday. +Blurred vision and \"pressure behind the eyes\" sensation. Denies fevers, otorrhea, jaw claudication.

## 2020-03-10 ENCOUNTER — HOSPITAL ENCOUNTER (EMERGENCY)
Age: 60
Discharge: HOME | End: 2020-03-10
Attending: EMERGENCY MEDICINE
Payer: MEDICARE

## 2020-03-10 VITALS
HEART RATE: 77 BPM | WEIGHT: 235.89 LBS | HEIGHT: 67 IN | TEMPERATURE: 97.9 F | DIASTOLIC BLOOD PRESSURE: 82 MMHG | RESPIRATION RATE: 14 BRPM | BODY MASS INDEX: 37.02 KG/M2 | OXYGEN SATURATION: 96 % | SYSTOLIC BLOOD PRESSURE: 131 MMHG

## 2020-03-10 DIAGNOSIS — M31.6 TEMPORAL ARTERITIS (HCC): Primary | ICD-10-CM

## 2020-03-10 PROCEDURE — 96375 TX/PRO/DX INJ NEW DRUG ADDON: CPT

## 2020-03-10 PROCEDURE — 96374 THER/PROPH/DIAG INJ IV PUSH: CPT

## 2020-03-10 PROCEDURE — 74011250636 HC RX REV CODE- 250/636: Performed by: EMERGENCY MEDICINE

## 2020-03-10 PROCEDURE — 99284 EMERGENCY DEPT VISIT MOD MDM: CPT

## 2020-03-10 RX ORDER — KETOROLAC TROMETHAMINE 30 MG/ML
15 INJECTION, SOLUTION INTRAMUSCULAR; INTRAVENOUS
Status: COMPLETED | OUTPATIENT
Start: 2020-03-10 | End: 2020-03-10

## 2020-03-10 RX ORDER — PREDNISONE 50 MG/1
50 TABLET ORAL DAILY
Qty: 7 TAB | Refills: 0 | Status: SHIPPED | OUTPATIENT
Start: 2020-03-10 | End: 2020-03-17

## 2020-03-10 RX ORDER — NAPROXEN 500 MG/1
500 TABLET ORAL
Qty: 20 TAB | Refills: 0 | Status: SHIPPED | OUTPATIENT
Start: 2020-03-10 | End: 2020-12-03

## 2020-03-10 RX ADMIN — KETOROLAC TROMETHAMINE 15 MG: 30 INJECTION, SOLUTION INTRAMUSCULAR at 20:15

## 2020-03-10 RX ADMIN — METHYLPREDNISOLONE SODIUM SUCCINATE 125 MG: 125 INJECTION, POWDER, FOR SOLUTION INTRAMUSCULAR; INTRAVENOUS at 20:15

## 2020-03-10 NOTE — ED PROVIDER NOTES
EMERGENCY DEPARTMENT HISTORY AND PHYSICAL EXAM      Date: 3/9/2020  Patient Name: Reji Duff  Patient Age and Sex: 61 y.o. male     History of Presenting Illness     Chief Complaint   Patient presents with    Headache     Pain at left ear and jaw radiating to top of head since yesterday, soreness reported.  Ear Pain     Pain at left ear since yesterday, chills/sweating last night, no fever at present       History Provided By: Patient    HPI: Reji Duff, 61 y.o. male With past medical history of diabetes, hypertension presenting today with left-sided headache. The patient reports that he has had symptoms since yesterday. He notes left-sided ear, temple, and jaw pain. He feels tenderness at the skin. He notes that he was having chills and sweating last night, but denies any fevers. He does not typically of headaches like this. Reports that the pain is moderate in severity, radiating to the left side of his head. No exacerbating or alleviating symptoms. There are no other complaints, changes, or physical findings at this time. PCP: German Jones, DO    No current facility-administered medications on file prior to encounter. Current Outpatient Medications on File Prior to Encounter   Medication Sig Dispense Refill    cephALEXin (KEFLEX) 500 mg capsule Take 1 Cap by mouth three (3) times daily. 15 Cap 0    gabapentin (NEURONTIN) 300 mg capsule Take 1 Cap by mouth nightly. Max Daily Amount: 300 mg. 30 Cap 0    atorvastatin (LIPITOR) 10 mg tablet Take 10 mg by mouth daily.  dulaglutide (TRULICITY) 1.5 ZR/6.7 mL sub-q pen 1.5 mg by SubCUTAneous route every seven (7) days.  colchicine 0.6 mg tablet Take 0.6 mg by mouth daily.  NIFEdipine ER (ADALAT CC) 90 mg ER tablet Take 90 mg by mouth daily. 0    melatonin 5 mg cap capsule Take 5 mg by mouth nightly.  traZODone (DESYREL) 50 mg tablet Take 50 mg by mouth as needed for Sleep.       irbesartan (AVAPRO) 300 mg tablet Take 300 mg by mouth daily.  tamsulosin (FLOMAX) 0.4 mg capsule Take 0.4 mg by mouth daily.  aspirin 81 mg chewable tablet Take 81 mg by mouth daily.  metFORMIN (GLUCOPHAGE) 1,000 mg tablet Take 1,000 mg by mouth two (2) times daily (with meals). Past History     Past Medical History:  Past Medical History:   Diagnosis Date    Arthritis     Chronic pain     Diabetes ([address]     GERD (gastroesophageal reflux disease)     Gout     Hypertension     Other ill-defined conditions(799.89)     gout       Past Surgical History:  Past Surgical History:   Procedure Laterality Date    HX APPENDECTOMY      HX ORTHOPAEDIC      RIGHT knee scope       Family History:  Family History   Problem Relation Age of Onset    Hypertension Mother     Diabetes Mother     Heart Disease Father        Social History:  Social History     Tobacco Use    Smoking status: Never Smoker    Smokeless tobacco: Never Used   Substance Use Topics    Alcohol use: Yes     Comment: occassionally \"weekends\"    Drug use: No       Allergies: Allergies   Allergen Reactions    Uloric [Febuxostat] Anaphylaxis    Allopurinol Hives         Review of Systems   Constitutional: No  fever,  +  headache  Skin: No  rash, No  jaundice  HEENT: No  nasal congestion, No  eye drainage. Resp: No cough,  No  wheezing  CV: No chest pain, No  palpitations  GI: No vomiting,  No  diarrhea.,  No  constipation  : No dysuria,  No  hematuria  MSK: No joint pain,  No  trauma  Neuro: No numbness, No  tingling  Psych: No suicidal, No  paranoid      Physical Exam     Patient Vitals for the past 12 hrs:   Temp Pulse Resp BP SpO2   03/09/20 1320 98.1 °F (36.7 °C) 94 16 144/83 98 %     General: alert, No acute distress  Eyes: EOMI, normal conjunctiva  Head: Tender to palpation in the left temporal area, no skin lesions  ENT: moist mucous membranes. ,  TMs normal bilaterally  Neck: Active, full ROM of neck. Skin: No rashes. no jaundice Lungs: Equal chest expansion. no respiratory distress. clear to auscultation bilaterally No accessory muscle usage  Heart: regular rate     no peripheral edema   2+ radial pulses and DPs bilaterally  Abd:  non distended soft, nontender. No rebound tenderness. No guarding  Back: Full ROM  MSK: Full, active ROM in all 4 extremities. Neuro: Person, Place, Time and Situation; normal speech; normal strength and sensation in bilateral upper and lower extremities  Psych: Cooperative with exam; Appropriate mood and affect           Diagnostic Study Results     Labs -     Recent Results (from the past 12 hour(s))   GLUCOSE, POC    Collection Time: 03/09/20  1:33 PM   Result Value Ref Range    Glucose (POC) 179 (H) 65 - 100 mg/dL    Performed by Melina Madden (traveller)    CBC WITH AUTOMATED DIFF    Collection Time: 03/09/20  1:38 PM   Result Value Ref Range    WBC 8.9 4.1 - 11.1 K/uL    RBC 4.74 4.10 - 5.70 M/uL    HGB 13.8 12.1 - 17.0 g/dL    HCT 42.0 36.6 - 50.3 %    MCV 88.6 80.0 - 99.0 FL    MCH 29.1 26.0 - 34.0 PG    MCHC 32.9 30.0 - 36.5 g/dL    RDW 14.5 11.5 - 14.5 %    PLATELET 686 866 - 734 K/uL    MPV 9.7 8.9 - 12.9 FL    NRBC 0.0 0  WBC    ABSOLUTE NRBC 0.00 0.00 - 0.01 K/uL    NEUTROPHILS 65 32 - 75 %    LYMPHOCYTES 24 12 - 49 %    MONOCYTES 10 5 - 13 %    EOSINOPHILS 0 0 - 7 %    BASOPHILS 0 0 - 1 %    IMMATURE GRANULOCYTES 1 (H) 0.0 - 0.5 %    ABS. NEUTROPHILS 5.8 1.8 - 8.0 K/UL    ABS. LYMPHOCYTES 2.2 0.8 - 3.5 K/UL    ABS. MONOCYTES 0.9 0.0 - 1.0 K/UL    ABS. EOSINOPHILS 0.0 0.0 - 0.4 K/UL    ABS. BASOPHILS 0.0 0.0 - 0.1 K/UL    ABS. IMM.  GRANS. 0.1 (H) 0.00 - 0.04 K/UL    DF AUTOMATED     METABOLIC PANEL, COMPREHENSIVE    Collection Time: 03/09/20  1:38 PM   Result Value Ref Range    Sodium 139 136 - 145 mmol/L    Potassium 3.7 3.5 - 5.1 mmol/L    Chloride 107 97 - 108 mmol/L    CO2 26 21 - 32 mmol/L    Anion gap 6 5 - 15 mmol/L    Glucose 176 (H) 65 - 100 mg/dL    BUN 8 6 - 20 MG/DL    Creatinine 1. 25 0.70 - 1.30 MG/DL    BUN/Creatinine ratio 6 (L) 12 - 20      GFR est AA >60 >60 ml/min/1.73m2    GFR est non-AA 59 (L) >60 ml/min/1.73m2    Calcium 9.3 8.5 - 10.1 MG/DL    Bilirubin, total 0.7 0.2 - 1.0 MG/DL    ALT (SGPT) 40 12 - 78 U/L    AST (SGOT) 18 15 - 37 U/L    Alk. phosphatase 63 45 - 117 U/L    Protein, total 7.5 6.4 - 8.2 g/dL    Albumin 3.7 3.5 - 5.0 g/dL    Globulin 3.8 2.0 - 4.0 g/dL    A-G Ratio 1.0 (L) 1.1 - 2.2     SED RATE (ESR)    Collection Time: 03/09/20  3:34 PM   Result Value Ref Range    Sed rate, automated 24 (H) 0 - 20 mm/hr       Radiologic Studies -   CT HEAD WO CONT   Final Result   IMPRESSION:      No evidence for acute intracranial abnormality                        CT Results  (Last 48 hours)               03/09/20 1511  CT HEAD WO CONT Final result    Impression:  IMPRESSION:       No evidence for acute intracranial abnormality                           Narrative:  INDICATION:   Headache        EXAM:  HEAD CT WITHOUT CONTRAST       COMPARISON:  1/7/2020, MR brain 1/8/2020       TECHNIQUE:  Routine noncontrast axial head CT was performed. Coronal and   sagittal multiplanar reconstructions were obtained. CT dose reduction was   achieved through use of a standardized protocol tailored for this examination   and automatic exposure control for dose modulation. FINDINGS:       The ventricles and cortical sulci are within normal limits. No evidence for acute intracranial hemorrhage, midline shift, or mass effect. Mild bilateral subcortical and periventricular areas of patchy low attenuation   is nonspecific but likely related to changes of chronic small vessel ischemic   disease. The basal cisterns are patent. The visualized paranasal sinuses and mastoid air cells are clear. No calvarial abnormality.                CXR Results  (Last 48 hours)    None            Medical Decision Making     Differential Diagnosis: Tension headache, migraine headache, temporal arteritis, electrolyte derangement    I reviewed the vital signs, available nursing notes, past medical history, past surgical history, family history and social history and old medical records. On my interpretation, Laboratory workup is significant for CBC is unremarkable, electrolytes remarkable for slight hyperglycemia, but unremarkable LFTs and electrolytes, ESR is minimally elevated at 24, CRP pending  On my interpretation of the radiology studies CT of the head shows no evidence of acute intracranial abnormality    Management/ED course: Patient presents today with left-sided headache. He has tenderness palpation overlying the left scalp, he has an elevated ESR, but normal white blood cell count, the CRP was pending, but the patient did not feel that he want to wait for the result. We discussed the possibility of temporal arteritis as a diagnosis for his left-sided headache symptoms. We also discussed that the patient should watch for possible rash development because this could be related to shingles. He has no other neurologic abnormalities, no weakness, no numbness, no tingling, and a normal head CT. I doubt stroke. Otherwise unremarkable electrolyte work-up. I have given him strict return precautions and we plan to have the patient follow-up with his primary care provider in the next 4 days to further evaluate for this headache. Patient is comfortable to plan for discharge. ED Course:   Initial assessment performed. The patients presenting problems have been discussed, and they are in agreement with the care plan formulated and outlined with them. I have encouraged them to ask questions as they arise throughout their visit. Procedures:  0    Dispo: Discharged. The patient has been re-evaluated and is ready for discharge. Reviewed available results with patient. Counseled patient on diagnosis and care plan.  Patient has expressed understanding, and all questions have been answered. Patient agrees with plan and agrees to follow up as recommended, or to return to the ED if their symptoms worsen. Discharge instructions have been provided and explained to the patient, along with reasons to return to the ED. PLAN:  Discharge Medication List as of 3/9/2020  6:50 PM      1.   2.     Follow-up Information     Follow up With Specialties Details Why [address] [facility],[address] [facility], Internal Medicine, Geriatric Medicine   [address] [place] [address]  [phone]          3. Return to ED if worse         Diagnosis     Clinical Impression:   1. Acute nonintractable headache, unspecified headache type    2. Left temporal headache        Attestations:  Toy Valenzuela MD        Please note that this dictation was completed with [employer], the computer voice recognition software. Quite often unanticipated grammatical, syntax, homophones, and other interpretive errors are inadvertently transcribed by the computer software. Please disregard these errors. Please excuse any errors that have escaped final proofreading. Thank you.

## 2020-03-10 NOTE — ED PROVIDER NOTES
EMERGENCY DEPARTMENT HISTORY AND PHYSICAL EXAM      Date: 3/10/2020  Patient Name: Carmenza Hussein  Patient Age and Sex: 61 y.o. male     History of Presenting Illness     Chief Complaint   Patient presents with    Fatigue     Pt is brought in via EMS-was seen here yesterday for posterior HA- reports HA is still persistent- today complaining of \"overwhelming weakness\" 1 hr PTA. Denies CP or SOB. , cardiac hx. EMS reports negative for stroke scale- no interventions were done en route.  Headache       History Provided By: Patient    HPI: Carmenza Hussein is a 57-year-old male with a history of TIA, presenting with left-sided headache and vision issues. Patient states that over the past 2 days has had intermittent left-sided headache that goes down into his jaw as well as into his side of his neck. Was seen here yesterday and had a CT of his head which was negative but had lab work done which was concerning for elevated ESR and CRP which was concerning for temporal arteritis. Patient did not want a wait for his CRP to come back and left. Patient states that he did feel better yesterday but then today has been having sharp intermittent pain in his left eye and in his left temporal area. There are no other complaints, changes, or physical findings at this time. PCP: Pablito Welch, DO    No current facility-administered medications on file prior to encounter. Current Outpatient Medications on File Prior to Encounter   Medication Sig Dispense Refill    cephALEXin (KEFLEX) 500 mg capsule Take 1 Cap by mouth three (3) times daily. 15 Cap 0    gabapentin (NEURONTIN) 300 mg capsule Take 1 Cap by mouth nightly. Max Daily Amount: 300 mg. 30 Cap 0    atorvastatin (LIPITOR) 10 mg tablet Take 10 mg by mouth daily.  dulaglutide (TRULICITY) 1.5 KX/1.1 mL sub-q pen 1.5 mg by SubCUTAneous route every seven (7) days.  colchicine 0.6 mg tablet Take 0.6 mg by mouth daily.       NIFEdipine ER (ADALAT CC) 90 mg ER tablet Take 90 mg by mouth daily. 0    melatonin 5 mg cap capsule Take 5 mg by mouth nightly.  traZODone (DESYREL) 50 mg tablet Take 50 mg by mouth as needed for Sleep.  irbesartan (AVAPRO) 300 mg tablet Take 300 mg by mouth daily.  tamsulosin (FLOMAX) 0.4 mg capsule Take 0.4 mg by mouth daily.  aspirin 81 mg chewable tablet Take 81 mg by mouth daily.  metFORMIN (GLUCOPHAGE) 1,000 mg tablet Take 1,000 mg by mouth two (2) times daily (with meals). Past History     Past Medical History:  Past Medical History:   Diagnosis Date    Arthritis     Chronic pain     Diabetes ([facility] [address]     GERD (gastroesophageal reflux disease)     Gout     Hypertension     Other ill-defined conditions(799.89)     gout       Past Surgical History:  Past Surgical History:   Procedure Laterality Date    HX APPENDECTOMY      HX ORTHOPAEDIC      RIGHT knee scope       Family History:  Family History   Problem Relation Age of Onset    Hypertension Mother     Diabetes Mother     Heart Disease Father        Social History:  Social History     Tobacco Use    Smoking status: Never Smoker    Smokeless tobacco: Never Used   Substance Use Topics    Alcohol use: Yes     Comment: occassionally \"weekends\"    Drug use: No       Allergies: Allergies   Allergen Reactions    Uloric [Febuxostat] Anaphylaxis    Allopurinol Hives         Review of Systems   Review of Systems   Constitutional: Negative for chills and fever. Eyes: Positive for photophobia, pain and visual disturbance. Respiratory: Negative for cough and shortness of breath. Cardiovascular: Negative for chest pain. Gastrointestinal: Negative for abdominal pain, constipation, diarrhea, nausea and vomiting. Genitourinary: Negative for dysuria, frequency and hematuria. Musculoskeletal: Positive for neck pain. Neurological: Positive for headaches. Negative for weakness and numbness. All other systems reviewed and are negative. Physical Exam   Physical Exam  Vitals signs and nursing note reviewed. Constitutional:       Appearance: He is well-developed. HENT:      Head: Normocephalic and atraumatic. Comments: Patient is exquisitely tender over his left temporal artery. Also tender over his left neck and left jaw. Ears are normal.  Able to range his neck without any difficulty. Pupils equal and reactive, extraocular movements intact, no cranial nerve deficits  Eyes:      Conjunctiva/sclera: Conjunctivae normal.   Neck:      Musculoskeletal: Normal range of motion and neck supple. Cardiovascular:      Rate and Rhythm: Normal rate and regular rhythm. Pulmonary:      Effort: Pulmonary effort is normal. No respiratory distress. Breath sounds: Normal breath sounds. Abdominal:      General: There is no distension. Palpations: Abdomen is soft. Tenderness: There is no abdominal tenderness. Musculoskeletal: Normal range of motion. Skin:     General: Skin is warm and dry. Neurological:      General: No focal deficit present. Mental Status: He is alert and oriented to person, place, and time. Mental status is at baseline. Psychiatric:         Mood and Affect: Mood normal.          Diagnostic Study Results     Labs -   No results found for this or any previous visit (from the past 12 hour(s)). Radiologic Studies -   No orders to display     CT Results  (Last 48 hours)               03/09/20 1511  CT HEAD WO CONT Final result    Impression:  IMPRESSION:       No evidence for acute intracranial abnormality                           Narrative:  INDICATION:   Headache        EXAM:  HEAD CT WITHOUT CONTRAST       COMPARISON:  1/7/2020, MR brain 1/8/2020       TECHNIQUE:  Routine noncontrast axial head CT was performed. Coronal and   sagittal multiplanar reconstructions were obtained.   CT dose reduction was   achieved through use of a standardized protocol tailored for this examination   and automatic exposure control for dose modulation. FINDINGS:       The ventricles and cortical sulci are within normal limits. No evidence for acute intracranial hemorrhage, midline shift, or mass effect. Mild bilateral subcortical and periventricular areas of patchy low attenuation   is nonspecific but likely related to changes of chronic small vessel ischemic   disease. The basal cisterns are patent. The visualized paranasal sinuses and mastoid air cells are clear. No calvarial abnormality. CXR Results  (Last 48 hours)    None            Medical Decision Making   I am the first provider for this patient. I reviewed the vital signs, available nursing notes, past medical history, past surgical history, family history and social history. Vital Signs-Reviewed the patient's vital signs. Patient Vitals for the past 12 hrs:   Temp Pulse Resp BP SpO2   03/10/20 2130 97.9 °F (36.6 °C) 77 14 131/82 96 %   03/10/20 2100    128/79 97 %   03/10/20 2030    112/63 97 %   03/10/20 1856 98 °F (36.7 °C) 85 18 162/85 99 %       Records Reviewed: Nursing Notes and Old Medical Records    Provider Notes (Medical Decision Making):   Presenting with left sided headache with some vision changes and sharp in nature. Had a work-up yesterday with CT head and ESR and baseline lab work. Showed elevated ESR and CRP which ended up coming back elevated concerning for temporal arteritis. Given this, I will give him IV steroids and have him follow-up with neurology for possible temporal artery biopsy. ED Course:   Initial assessment performed. The patients presenting problems have been discussed, and they are in agreement with the care plan formulated and outlined with them. I have encouraged them to ask questions as they arise throughout their visit. ED Course as of Mar 10 2201   Tue Mar 10, 2020   2046 Patient feeling better.   Will have to follow-up with neurology as well as vascular surgery about a biopsy. Will prescribe high-dose steroids and anti-inflammatories. [JS]      ED Course User Index  [JS] Christofer Youngblood MD     Critical Care Time:   0    Disposition:  Discharge Note:  The patient has been re-evaluated and is ready for discharge. Reviewed available results with patient. Counseled patient on diagnosis and care plan. Patient has expressed understanding, and all questions have been answered. Patient agrees with plan and agrees to follow up as recommended, or to return to the ED if their symptoms worsen. Discharge instructions have been provided and explained to the patient, along with reasons to return to the ED. PLAN:  Current Discharge Medication List      START taking these medications    Details   predniSONE (DELTASONE) 50 mg tablet Take 1 Tab by mouth daily for 7 days. Qty: 7 Tab, Refills: 0      naproxen (NAPROSYN) 500 mg tablet Take 1 Tab by mouth every twelve (12) hours as needed for Pain. Qty: 20 Tab, Refills: 0           2. Follow-up Information     Follow up With Specialties Details Why Contact Info    Harlan King MD General and Vascular Surgery Schedule an appointment as soon as possible for a visit  [address]  [address] [facility]      Hiral Villeda MD Neurology Schedule an appointment as soon as possible for a visit  [address]  [phone]          3. Return to ED if worse     Diagnosis     Clinical Impression:   1. Temporal arteritis (HCC)        Attestations:    Uzma Alfaro M.D. Please note that this dictation was completed with [employer], the computer voice recognition software. Quite often unanticipated grammatical, syntax, homophones, and other interpretive errors are inadvertently transcribed by the computer software. Please disregard these errors. Please excuse any errors that have escaped final proofreading. Thank you.

## 2020-03-11 NOTE — DISCHARGE INSTRUCTIONS
Patient Education        Temporal Arteritis: Care Instructions  Your Care Instructions    Temporal arteritis is an inflammation of blood vessels leading to your head and eyes. It usually affects people older than 50. It is more common in women. This condition is also called giant cell arteritis. Temporal arteritis causes a dull, throbbing headache on one side of the head around the eye or near the temple. Sometimes the pain feels like stabbing or burning. It may also cause jaw pain and vision loss. Temporal arteritis is treated right away to prevent blindness or stroke. Your doctor will prescribe steroids that you take as pills. The steroids can also be given to you through a needle in your vein. You should get better quickly, usually in 1 to 3 days. But you may need to take medicine for more than 2 years to prevent problems. Follow-up care is a key part of your treatment and safety. Be sure to make and go to all appointments, and call your doctor if you are having problems. It's also a good idea to know your test results and keep a list of the medicines you take. How can you care for yourself at home? · Take your medicines exactly as prescribed. Call your doctor if you have any problems with your medicine. · Take your steroid in the morning with food unless your doctor tells you otherwise. · Eat a balanced diet. · If you are on long-term steroids, take a daily vitamin containing calcium and vitamin D. This can prevent bone thinning caused by the steroids. · Get regular, gentle exercise to keep your bones strong and prevent bone loss. Walking is a good choice. Exercise can also help you cope with the illness. · Tell any health professional that cares for you that you are taking steroids. You may want to wear medical alert jewelry that lists this medicine. You can buy this at most [employer]ymb[employer]. When should you call for help? Call 911 anytime you think you may need emergency care.  For example, call if:    · You have twitching, jerking, or a seizure.     · You passed out (lost consciousness).     · You have symptoms of a stroke. These may include:  ? Sudden numbness, tingling, weakness, or loss of movement in your face, arm, or leg, especially on only one side of your body. ? Sudden vision changes. ? Sudden trouble speaking. ? Sudden confusion or trouble understanding simple statements. ? Sudden problems with walking or balance. ? A sudden, severe headache that is different from past headaches.    Call your doctor now or seek immediate medical care if:    · You get new headaches.     · You have nausea or heartburn.     · You have side effects of your steroid medicine, such as:  ? Weight gain. ? Mood changes. ? Trouble sleeping. ? Bruising easily.    Watch closely for changes in your health, and be sure to contact your doctor if:    · You do not get better as expected. Where can you learn more? Go to http://gi-jane.info/. Enter L802 in the search box to learn more about \"Temporal Arteritis: Care Instructions. \"  Current as of: April 1, 2019  Content Version: 12.2  © 7316-3940 [employer]. Care instructions adapted under license by [employer] (which disclaims liability or warranty for this information). If you have questions about a medical condition or this instruction, always ask your healthcare professional. [address] any warranty or liability for your use of this information.

## 2020-03-11 NOTE — ED NOTES
Dr. Luba Marinelli reviewed discharge instructions with the patient. The patient verbalized understanding. All questions and concerns were addressed. The patient is discharged via wheelchair in the care of family members with instructions and prescriptions in hand. Pt is alert and oriented x 4. Respirations are clear and unlabored.

## 2020-04-30 ENCOUNTER — HOSPITAL ENCOUNTER (EMERGENCY)
Age: 60
Discharge: HOME | End: 2020-05-01
Attending: EMERGENCY MEDICINE
Payer: MEDICARE

## 2020-04-30 ENCOUNTER — APPOINTMENT (OUTPATIENT)
Dept: GENERAL RADIOLOGY | Age: 60
End: 2020-04-30
Attending: EMERGENCY MEDICINE
Payer: MEDICARE

## 2020-04-30 DIAGNOSIS — R07.89 ATYPICAL CHEST PAIN: Primary | ICD-10-CM

## 2020-04-30 LAB
ALBUMIN SERPL-MCNC: 3.9 G/DL (ref 3.5–5)
ALBUMIN/GLOB SERPL: 1.1 {RATIO} (ref 1.1–2.2)
ALP SERPL-CCNC: 61 U/L (ref 45–117)
ALT SERPL-CCNC: 71 U/L (ref 12–78)
ANION GAP SERPL CALC-SCNC: 4 MMOL/L (ref 5–15)
APPEARANCE UR: CLEAR
AST SERPL-CCNC: 32 U/L (ref 15–37)
BACTERIA URNS QL MICRO: NEGATIVE /HPF
BASOPHILS # BLD: 0 K/UL (ref 0–0.1)
BASOPHILS NFR BLD: 0 % (ref 0–1)
BILIRUB SERPL-MCNC: 0.2 MG/DL (ref 0.2–1)
BILIRUB UR QL: NEGATIVE
BNP SERPL-MCNC: 12 PG/ML
BUN SERPL-MCNC: 18 MG/DL (ref 6–20)
BUN/CREAT SERPL: 15 (ref 12–20)
CALCIUM SERPL-MCNC: 9.1 MG/DL (ref 8.5–10.1)
CHLORIDE SERPL-SCNC: 110 MMOL/L (ref 97–108)
CO2 SERPL-SCNC: 27 MMOL/L (ref 21–32)
COLOR UR: ABNORMAL
CREAT SERPL-MCNC: 1.18 MG/DL (ref 0.7–1.3)
D DIMER PPP FEU-MCNC: 0.71 MG/L FEU (ref 0–0.65)
DIFFERENTIAL METHOD BLD: NORMAL
EOSINOPHIL # BLD: 0.1 K/UL (ref 0–0.4)
EOSINOPHIL NFR BLD: 1 % (ref 0–7)
EPITH CASTS URNS QL MICRO: ABNORMAL /LPF
ERYTHROCYTE [DISTWIDTH] IN BLOOD BY AUTOMATED COUNT: 14.5 % (ref 11.5–14.5)
GLOBULIN SER CALC-MCNC: 3.6 G/DL (ref 2–4)
GLUCOSE SERPL-MCNC: 147 MG/DL (ref 65–100)
GLUCOSE UR STRIP.AUTO-MCNC: 100 MG/DL
HCT VFR BLD AUTO: 42.4 % (ref 36.6–50.3)
HGB BLD-MCNC: 13.8 G/DL (ref 12.1–17)
HGB UR QL STRIP: NEGATIVE
HYALINE CASTS URNS QL MICRO: ABNORMAL /LPF (ref 0–5)
IMM GRANULOCYTES # BLD AUTO: 0 K/UL (ref 0–0.04)
IMM GRANULOCYTES NFR BLD AUTO: 0 % (ref 0–0.5)
KETONES UR QL STRIP.AUTO: NEGATIVE MG/DL
LEUKOCYTE ESTERASE UR QL STRIP.AUTO: NEGATIVE
LYMPHOCYTES # BLD: 2.8 K/UL (ref 0.8–3.5)
LYMPHOCYTES NFR BLD: 31 % (ref 12–49)
MCH RBC QN AUTO: 28.6 PG (ref 26–34)
MCHC RBC AUTO-ENTMCNC: 32.5 G/DL (ref 30–36.5)
MCV RBC AUTO: 88 FL (ref 80–99)
MONOCYTES # BLD: 1 K/UL (ref 0–1)
MONOCYTES NFR BLD: 11 % (ref 5–13)
NEUTS SEG # BLD: 5 K/UL (ref 1.8–8)
NEUTS SEG NFR BLD: 57 % (ref 32–75)
NITRITE UR QL STRIP.AUTO: NEGATIVE
NRBC # BLD: 0 K/UL (ref 0–0.01)
NRBC BLD-RTO: 0 PER 100 WBC
PH UR STRIP: 5.5 [PH] (ref 5–8)
PLATELET # BLD AUTO: 243 K/UL (ref 150–400)
PMV BLD AUTO: 10.1 FL (ref 8.9–12.9)
POTASSIUM SERPL-SCNC: 4 MMOL/L (ref 3.5–5.1)
PROT SERPL-MCNC: 7.5 G/DL (ref 6.4–8.2)
PROT UR STRIP-MCNC: NEGATIVE MG/DL
RBC # BLD AUTO: 4.82 M/UL (ref 4.1–5.7)
RBC #/AREA URNS HPF: ABNORMAL /HPF (ref 0–5)
SODIUM SERPL-SCNC: 141 MMOL/L (ref 136–145)
SP GR UR REFRACTOMETRY: 1.02 (ref 1–1.03)
TROPONIN I SERPL-MCNC: <0.05 NG/ML
UA: UC IF INDICATED,UAUC: ABNORMAL
UROBILINOGEN UR QL STRIP.AUTO: 1 EU/DL (ref 0.2–1)
WBC # BLD AUTO: 8.9 K/UL (ref 4.1–11.1)
WBC URNS QL MICRO: ABNORMAL /HPF (ref 0–4)

## 2020-04-30 PROCEDURE — 85025 COMPLETE CBC W/AUTO DIFF WBC: CPT

## 2020-04-30 PROCEDURE — 71046 X-RAY EXAM CHEST 2 VIEWS: CPT

## 2020-04-30 PROCEDURE — 83880 ASSAY OF NATRIURETIC PEPTIDE: CPT

## 2020-04-30 PROCEDURE — 81001 URINALYSIS AUTO W/SCOPE: CPT

## 2020-04-30 PROCEDURE — 85379 FIBRIN DEGRADATION QUANT: CPT

## 2020-04-30 PROCEDURE — 93005 ELECTROCARDIOGRAM TRACING: CPT

## 2020-04-30 PROCEDURE — 80053 COMPREHEN METABOLIC PANEL: CPT

## 2020-04-30 PROCEDURE — 36415 COLL VENOUS BLD VENIPUNCTURE: CPT

## 2020-04-30 PROCEDURE — 99285 EMERGENCY DEPT VISIT HI MDM: CPT

## 2020-04-30 PROCEDURE — 84484 ASSAY OF TROPONIN QUANT: CPT

## 2020-05-01 ENCOUNTER — PATIENT OUTREACH (OUTPATIENT)
Dept: FAMILY MEDICINE CLINIC | Age: 60
End: 2020-05-01

## 2020-05-01 ENCOUNTER — APPOINTMENT (OUTPATIENT)
Dept: CT IMAGING | Age: 60
End: 2020-05-01
Attending: EMERGENCY MEDICINE
Payer: MEDICARE

## 2020-05-01 VITALS
DIASTOLIC BLOOD PRESSURE: 83 MMHG | TEMPERATURE: 98.8 F | HEART RATE: 75 BPM | HEIGHT: 67 IN | RESPIRATION RATE: 22 BRPM | OXYGEN SATURATION: 96 % | BODY MASS INDEX: 37.5 KG/M2 | SYSTOLIC BLOOD PRESSURE: 134 MMHG

## 2020-05-01 LAB
ATRIAL RATE: 81 BPM
CALCULATED P AXIS, ECG09: 62 DEGREES
CALCULATED R AXIS, ECG10: -56 DEGREES
CALCULATED T AXIS, ECG11: 0 DEGREES
DIAGNOSIS, 93000: NORMAL
P-R INTERVAL, ECG05: 158 MS
Q-T INTERVAL, ECG07: 366 MS
QRS DURATION, ECG06: 84 MS
QTC CALCULATION (BEZET), ECG08: 425 MS
VENTRICULAR RATE, ECG03: 81 BPM

## 2020-05-01 PROCEDURE — 96374 THER/PROPH/DIAG INJ IV PUSH: CPT

## 2020-05-01 PROCEDURE — 71275 CT ANGIOGRAPHY CHEST: CPT

## 2020-05-01 PROCEDURE — 74011636320 HC RX REV CODE- 636/320: Performed by: EMERGENCY MEDICINE

## 2020-05-01 PROCEDURE — 74011250637 HC RX REV CODE- 250/637: Performed by: EMERGENCY MEDICINE

## 2020-05-01 PROCEDURE — 74011250636 HC RX REV CODE- 250/636: Performed by: EMERGENCY MEDICINE

## 2020-05-01 RX ORDER — KETOROLAC TROMETHAMINE 30 MG/ML
15 INJECTION, SOLUTION INTRAMUSCULAR; INTRAVENOUS
Status: COMPLETED | OUTPATIENT
Start: 2020-05-01 | End: 2020-05-01

## 2020-05-01 RX ORDER — SODIUM CHLORIDE 0.9 % (FLUSH) 0.9 %
10 SYRINGE (ML) INJECTION
Status: COMPLETED | OUTPATIENT
Start: 2020-05-01 | End: 2020-05-01

## 2020-05-01 RX ORDER — OXYCODONE AND ACETAMINOPHEN 5; 325 MG/1; MG/1
2 TABLET ORAL
Status: COMPLETED | OUTPATIENT
Start: 2020-05-01 | End: 2020-05-01

## 2020-05-01 RX ORDER — METHOCARBAMOL 750 MG/1
750 TABLET, FILM COATED ORAL 4 TIMES DAILY
Qty: 20 TAB | Refills: 0 | Status: SHIPPED | OUTPATIENT
Start: 2020-05-01 | End: 2020-05-06

## 2020-05-01 RX ADMIN — Medication 10 ML: at 01:37

## 2020-05-01 RX ADMIN — KETOROLAC TROMETHAMINE 15 MG: 30 INJECTION, SOLUTION INTRAMUSCULAR at 01:00

## 2020-05-01 RX ADMIN — IOPAMIDOL 90 ML: 755 INJECTION, SOLUTION INTRAVENOUS at 01:37

## 2020-05-01 RX ADMIN — OXYCODONE HYDROCHLORIDE AND ACETAMINOPHEN 2 TABLET: 5; 325 TABLET ORAL at 02:23

## 2020-05-01 NOTE — ED PROVIDER NOTES
EMERGENCY DEPARTMENT HISTORY AND PHYSICAL EXAM     ----------------------------------------------------------------------------  Please note that this dictation was completed with [employer], the [employer] voice recognition software. Quite often unanticipated grammatical, syntax, homophones, and other interpretive errors are inadvertently transcribed by the computer software. Please disregard these errors. Please excuse any errors that have escaped final proofreading  ----------------------------------------------------------------------------      Date: 4/30/2020  Patient Name: Dominick Eduardo    History of Presenting Illness     Chief Complaint   Patient presents with    Shortness of Breath    Flank Pain       History Provided By:  Patient    HPI: Dominick Eduardo is a 61 y.o. male, with significant pmhx of hypertension, diabetes, who presents via private vehicle to the ED with c/o weeks of bilateral lower chest/rib pain that waxes and wanes. Notes worsening pain with certain twisting motions or attempting to sit up from the bed. Patient notes no tenderness to palpation but does have some shortness of breath and pain with deep inspiration. Patient reports feeling short winded when walking short distances (approximately 10 feet.)  Denies new recent exercises or lifting of heavy materials. Patient also specifically denies any associated fevers, chills, nausea, vomiting, diarrhea, abd pain, changes in BM, urinary sxs, or headache. Social Hx: denies tobacco  denies EtOH , denies Illicit Drugs    There are no other complaints, changes, or physical findings at this time.      PCP: Wilda Zhong, DO    Allergies   Allergen Reactions    Uloric [Febuxostat] Anaphylaxis    Allopurinol Hives       Current Facility-Administered Medications   Medication Dose Route Frequency Provider Last Rate Last Dose    oxyCODONE-acetaminophen (PERCOCET) 5-325 mg per tablet 2 Tab  2 Tab Oral NOW Jo-Ann Ortega MD         Current Outpatient Medications   Medication Sig Dispense Refill    methocarbamoL (Robaxin-750) 750 mg tablet Take 1 Tab by mouth four (4) times daily for 5 days. 20 Tab 0    naproxen (NAPROSYN) 500 mg tablet Take 1 Tab by mouth every twelve (12) hours as needed for Pain. 20 Tab 0    cephALEXin (KEFLEX) 500 mg capsule Take 1 Cap by mouth three (3) times daily. 15 Cap 0    gabapentin (NEURONTIN) 300 mg capsule Take 1 Cap by mouth nightly. Max Daily Amount: 300 mg. 30 Cap 0    atorvastatin (LIPITOR) 10 mg tablet Take 10 mg by mouth daily.  dulaglutide (TRULICITY) 1.5 XK/1.2 mL sub-q pen 1.5 mg by SubCUTAneous route every seven (7) days.  colchicine 0.6 mg tablet Take 0.6 mg by mouth daily.  NIFEdipine ER (ADALAT CC) 90 mg ER tablet Take 90 mg by mouth daily. 0    melatonin 5 mg cap capsule Take 5 mg by mouth nightly.  traZODone (DESYREL) 50 mg tablet Take 50 mg by mouth as needed for Sleep.  irbesartan (AVAPRO) 300 mg tablet Take 300 mg by mouth daily.  tamsulosin (FLOMAX) 0.4 mg capsule Take 0.4 mg by mouth daily.  aspirin 81 mg chewable tablet Take 81 mg by mouth daily.  metFORMIN (GLUCOPHAGE) 1,000 mg tablet Take 1,000 mg by mouth two (2) times daily (with meals).          Past History     Past Medical History:  Past Medical History:   Diagnosis Date    Arthritis     Chronic pain     Diabetes ([facility] [address]     GERD (gastroesophageal reflux disease)     Gout     Hypertension     Other ill-defined conditions(799.89)     gout       Past Surgical History:  Past Surgical History:   Procedure Laterality Date    HX APPENDECTOMY      HX ORTHOPAEDIC      RIGHT knee scope       Family History:  Family History   Problem Relation Age of Onset    Hypertension Mother     Diabetes Mother     Heart Disease Father        Social History:  Social History     Tobacco Use    Smoking status: Never Smoker    Smokeless tobacco: Never Used   Substance Use Topics    Alcohol use: Yes     Comment: occassionally \"weekends\"    Drug use: No       Allergies: Allergies   Allergen Reactions    Uloric [Febuxostat] Anaphylaxis    Allopurinol Hives         Review of Systems   Review of Systems   Constitutional: Negative for chills and fever. HENT: Negative. Eyes: Negative. Respiratory: Positive for chest tightness and shortness of breath. Negative for cough. Cardiovascular: Positive for chest pain. Negative for leg swelling. Gastrointestinal: Negative for abdominal pain, diarrhea, nausea and vomiting. Endocrine: Negative. Genitourinary: Negative for difficulty urinating and dysuria. Musculoskeletal: Negative for myalgias. Skin: Negative. Neurological: Negative. Psychiatric/Behavioral: Negative. All other systems reviewed and are negative. Physical Exam   Physical Exam  Vitals signs and nursing note reviewed. Constitutional:       General: He is not in acute distress. Appearance: He is well-developed. He is not diaphoretic. HENT:      Head: Normocephalic and atraumatic. Nose: Nose normal.      Mouth/Throat:      Pharynx: No oropharyngeal exudate. Eyes:      Conjunctiva/sclera: Conjunctivae normal.      Pupils: Pupils are equal, round, and reactive to light. Neck:      Musculoskeletal: Normal range of motion and neck supple. Vascular: No JVD. Cardiovascular:      Rate and Rhythm: Normal rate and regular rhythm. Heart sounds: Normal heart sounds. No murmur. No friction rub. Pulmonary:      Effort: Pulmonary effort is normal. No respiratory distress. Comments: Pt able to speak in full, unlabored sentences. Chest:      Chest wall: No tenderness or crepitus. Abdominal:      General: Bowel sounds are normal. There is no distension. Palpations: Abdomen is soft. Tenderness: There is no abdominal tenderness. There is no rebound. Musculoskeletal: Normal range of motion. General: No tenderness.    Skin:     General: Skin is warm and dry. Findings: No rash. Neurological:      Mental Status: He is alert and oriented to person, place, and time. Cranial Nerves: No cranial nerve deficit. Psychiatric:         Speech: Speech normal.         Behavior: Behavior normal.         Thought Content: Thought content normal.         Judgment: Judgment normal.           Diagnostic Study Results     Labs -     Recent Results (from the past 12 hour(s))   EKG, 12 LEAD, INITIAL    Collection Time: 04/30/20  9:48 PM   Result Value Ref Range    Ventricular Rate 81 BPM    Atrial Rate 81 BPM    P-R Interval 158 ms    QRS Duration 84 ms    Q-T Interval 366 ms    QTC Calculation (Bezet) 425 ms    Calculated P Axis 62 degrees    Calculated R Axis -56 degrees    Calculated T Axis 0 degrees    Diagnosis       Normal sinus rhythm  Left anterior fascicular block  Cannot rule out Inferior infarct (masked by fascicular block?) , age   undetermined  When compared with ECG of 07-JAN-2020 15:13,  Nonspecific T wave abnormality, worse in Lateral leads     CBC WITH AUTOMATED DIFF    Collection Time: 04/30/20 11:15 PM   Result Value Ref Range    WBC 8.9 4.1 - 11.1 K/uL    RBC 4.82 4.10 - 5.70 M/uL    HGB 13.8 12.1 - 17.0 g/dL    HCT 42.4 36.6 - 50.3 %    MCV 88.0 80.0 - 99.0 FL    MCH 28.6 26.0 - 34.0 PG    MCHC 32.5 30.0 - 36.5 g/dL    RDW 14.5 11.5 - 14.5 %    PLATELET 639 477 - 587 K/uL    MPV 10.1 8.9 - 12.9 FL    NRBC 0.0 0  WBC    ABSOLUTE NRBC 0.00 0.00 - 0.01 K/uL    NEUTROPHILS 57 32 - 75 %    LYMPHOCYTES 31 12 - 49 %    MONOCYTES 11 5 - 13 %    EOSINOPHILS 1 0 - 7 %    BASOPHILS 0 0 - 1 %    IMMATURE GRANULOCYTES 0 0.0 - 0.5 %    ABS. NEUTROPHILS 5.0 1.8 - 8.0 K/UL    ABS. LYMPHOCYTES 2.8 0.8 - 3.5 K/UL    ABS. MONOCYTES 1.0 0.0 - 1.0 K/UL    ABS. EOSINOPHILS 0.1 0.0 - 0.4 K/UL    ABS. BASOPHILS 0.0 0.0 - 0.1 K/UL    ABS. IMM.  GRANS. 0.0 0.00 - 0.04 K/UL    DF AUTOMATED     METABOLIC PANEL, COMPREHENSIVE    Collection Time: 04/30/20 11:15 PM Result Value Ref Range    Sodium 141 136 - 145 mmol/L    Potassium 4.0 3.5 - 5.1 mmol/L    Chloride 110 (H) 97 - 108 mmol/L    CO2 27 21 - 32 mmol/L    Anion gap 4 (L) 5 - 15 mmol/L    Glucose 147 (H) 65 - 100 mg/dL    BUN 18 6 - 20 MG/DL    Creatinine 1.18 0.70 - 1.30 MG/DL    BUN/Creatinine ratio 15 12 - 20      GFR est AA >60 >60 ml/min/1.73m2    GFR est non-AA >60 >60 ml/min/1.73m2    Calcium 9.1 8.5 - 10.1 MG/DL    Bilirubin, total 0.2 0.2 - 1.0 MG/DL    ALT (SGPT) 71 12 - 78 U/L    AST (SGOT) 32 15 - 37 U/L    Alk. phosphatase 61 45 - 117 U/L    Protein, total 7.5 6.4 - 8.2 g/dL    Albumin 3.9 3.5 - 5.0 g/dL    Globulin 3.6 2.0 - 4.0 g/dL    A-G Ratio 1.1 1.1 - 2.2     TROPONIN I    Collection Time: 04/30/20 11:15 PM   Result Value Ref Range    Troponin-I, Qt. <0.05 <0.05 ng/mL   D DIMER    Collection Time: 04/30/20 11:15 PM   Result Value Ref Range    D-dimer 0.71 (H) 0.00 - 0.65 mg/L FEU   URINALYSIS W/ REFLEX CULTURE    Collection Time: 04/30/20 11:15 PM   Result Value Ref Range    Color YELLOW/STRAW      Appearance CLEAR CLEAR      Specific gravity 1.019 1.003 - 1.030      pH (UA) 5.5 5.0 - 8.0      Protein Negative NEG mg/dL    Glucose 100 (A) NEG mg/dL    Ketone Negative NEG mg/dL    Bilirubin Negative NEG      Blood Negative NEG      Urobilinogen 1.0 0.2 - 1.0 EU/dL    Nitrites Negative NEG      Leukocyte Esterase Negative NEG      WBC 0-4 0 - 4 /hpf    RBC 0-5 0 - 5 /hpf    Epithelial cells FEW FEW /lpf    Bacteria Negative NEG /hpf    UA:UC IF INDICATED CULTURE NOT INDICATED BY UA RESULT CNI      Hyaline cast 0-2 0 - 5 /lpf   NT-PRO BNP    Collection Time: 04/30/20 11:15 PM   Result Value Ref Range    NT pro-BNP 12 <125 PG/ML       Radiologic Studies -   CTA CHEST W OR W WO CONT   Final Result   IMPRESSION:    There is no pulmonary embolism. There is no aortic aneurysm or dissection. No acute intrathoracic process is identified. Incidental findings are as   described above.           XR CHEST PA LAT   Final Result   IMPRESSION: No acute intrathoracic disease. CT Results  (Last 48 hours)               05/01/20 0136  CTA CHEST W OR W WO CONT Final result    Impression:  IMPRESSION:    There is no pulmonary embolism. There is no aortic aneurysm or dissection. No acute intrathoracic process is identified. Incidental findings are as   described above. Narrative:  Clinical history: cp   INDICATION:   cp   COMPARISON: 1/7/2020       TECHNIQUE: CT of the chest with  IV contrast , Isovue-370 is performed. Axial   images from the thoracic inlet to the level of the upper abdomen are obtained. Manual post-processing of the images and coronal reformatting is also performed. CT dose reduction was achieved through use of a standardized protocol tailored   for this examination and automatic exposure control for dose modulation. Multiplanar reformatted imaging was performed. Sagittal and coronal reformatting. 3-D Postprocessing of imaging was performed. 3-D MIP reconstructed images were obtained in the coronal plane. FINDINGS:    There is no pulmonary embolism. There is no aortic aneurysm or dissection. Hepatic steatosis. Small hiatal hernia with circumferential esophageal wall   thickening may be related to reflux. There is no pleural or pericardial   effusion. There is no mediastinal, axillary or hilar lymphadenopathy. The aorta   is normal in course and caliber. The proximal pulmonary arteries are without   evidence of filling defects. No lytic or blastic lesions are identified. The   remainder of the upper abdomen visualized is unremarkable. CXR Results  (Last 48 hours)               04/30/20 2221  XR CHEST PA LAT Final result    Impression:  IMPRESSION: No acute intrathoracic disease. Narrative:  Clinical history: sob   INDICATION:   sob   COMPARISON: 1/7/2020       FINDINGS:    PA and lateral views of the chest are obtained.     The cardiopericardial silhouette is within normal limits. There is no pleural   effusion, pneumothorax or focal consolidation present. Medical Decision Making   I am the first provider for this patient. I reviewed the vital signs, available nursing notes, past medical history, past surgical history, family history and social history. Vital Signs-Reviewed the patient's vital signs. Patient Vitals for the past 12 hrs:   Temp Pulse Resp BP SpO2   05/01/20 0200  75 22 134/83 96 %   05/01/20 0100  74 14 138/90 95 %   04/30/20 2345  81 14 141/85 96 %   04/30/20 2330  76 21 138/90 97 %   04/30/20 2300    131/81 96 %   04/30/20 2245    133/80 97 %   04/30/20 2137 98.8 °F (37.1 °C) 86 18 134/86 97 %       Pulse Oximetry Analysis - 97% on RA    Cardiac Monitor:   Rate: 86 bpm  Rhythm: nsr      Provider Notes (Medical Decision Making):     DDX:  ACS, pleural effusion, pneumonia, electrolyte abnormality, dehydration, musculoskeletal pain, PE    Plan:  EKG, labs, chest x-ray, analgesic, d-dimer, BNP    Impression:  Atypical chest pain, bilateral lower rib pain    ED Course:   Initial assessment performed. The patients presenting problems have been discussed, and they are in agreement with the care plan formulated and outlined with them. I have encouraged them to ask questions as they arise throughout their visit. I reviewed our electronic medical record system for any past medical records that were available that may contribute to the patients current condition, the nursing notes and and vital signs from today's visit    Nursing notes will be reviewed as they become available in realtime while the pt has been in the ED. Isaiah Muniz MD      EKG interpretation 5003: NSR, nl Axis, rate 81; , QRS 84, QTc 425; no acute ischemia; interpreted by Isaiah Muniz MD    I personally reviewed/interpreted pt's imaging. Agree with official read by radiology as noted above.   Isaiah Muniz MD    2:18 AM  Progress note:  Pt noted to be feeling better, ready for discharge. Discussed lab and imaging findings with pt, specifically noting the findings on CT and normal troponin. Pt will follow up with primary care/cardiology as instructed. All questions have been answered, pt voiced understanding and agreement with plan. Specific return precautions provided in addition to instructions for pt to return to the ED immediately should sx worsen at any time. Quin John MD           Critical Care Time:     none      Diagnosis     Clinical Impression:   1. Atypical chest pain        PLAN:  1. Current Discharge Medication List      START taking these medications    Details   methocarbamoL (Robaxin-750) 750 mg tablet Take 1 Tab by mouth four (4) times daily for 5 days. Qty: 20 Tab, Refills: 0         CONTINUE these medications which have NOT CHANGED    Details   naproxen (NAPROSYN) 500 mg tablet Take 1 Tab by mouth every twelve (12) hours as needed for Pain. Qty: 20 Tab, Refills: 0           2. Follow-up Information     Follow up With Specialties Details Why [address] [facility], [place] [address],  Family Practice, Internal Medicine, Geriatric Medicine Schedule an appointment as soon as possible for a visit in 2 days  [address] [facility]  Schedule an appointment as soon as possible for a visit in 2 days  [address]  [place] [address]        Return to ED if worse     Disposition:  2:18 AM  The patient's results have been reviewed with family and/or caregiver. They verbally convey their understanding and agreement of the patient's signs, symptoms, diagnosis, treatment and prognosis and additionally agree to follow up as recommended in the discharge instructions or to return to the Emergency Room should the patient's condition change prior to their follow-up appointment.  The family and/or caregiver verbally agrees with the care-plan and all of their questions have been answered. The discharge instructions have also been provided to the them with educational information regarding the patient's diagnosis as well a list of reasons why the patient would want to return to the ER prior to their follow-up appointment should their condition change. Devendra Tejeda MD            This note will not be viewable in [address]

## 2020-05-01 NOTE — ED NOTES
Toy Franks MD reviewed discharge instructions with the patient. The patient verbalized understanding. All questions and concerns were addressed. The patient is discharged ambulatory with instructions and prescriptions in hand. Pt is alert and oriented x 4. Respirations are clear and unlabored.

## 2020-05-01 NOTE — PROGRESS NOTES
Patient contacted regarding recent discharge and COVID-19 risk   Care Transition Nurse/ Ambulatory Care Manager contacted the patient by telephone to perform post discharge assessment. Verified name and  with patient as identifiers. Patient has following risk factors of: diabetes. CTN/ACM reviewed discharge instructions, medical action plan and red flags related to discharge diagnosis. Reviewed and educated them on any new and changed medications related to discharge diagnosis. Advised obtaining a 90-day supply of all daily and as-needed medications. Education provided regarding infection prevention, and signs and symptoms of COVID-19 and when to seek medical attention with patient who verbalized understanding. Discussed exposure protocols and quarantine from [address] you at higher risk for severe illness  and given an opportunity for questions and concerns. The patient agrees to contact the COVID-19 hotline [phone] or PCP office for questions related to their healthcare. CTN/ACM provided contact information for future reference. From CDC: Are you at higher risk for severe illness?  Wash your hands often.  Avoid close contact (6 feet, which is about two arm lengths) with people who are sick.  Put distance between yourself and other people if COVID-19 is spreading in your community.  Clean and disinfect frequently touched surfaces.  Avoid all cruise travel and non-essential air travel.  Call your healthcare professional if you have concerns about COVID-19 and your underlying condition or if you are sick. For more information on steps you can take to protect yourself, see CDC's How to Protect Yourself      Patient/family/caregiver given information for Mary Khan and agrees to enroll yes  Patient's preferred e-mail:  [email or web address]  Patient's preferred phone number: [phone]  Based on Loop alert triggers, patient will be contacted by nurse care manager for worsening symptoms. Pt will be further monitored by COVID Loop Team based on severity of symptoms and risk factors. [facility]    5/1/20 ACM attempted to contact patient at contact numbers as listed-left messages for patient to call back. JOVITAB

## 2020-05-01 NOTE — DISCHARGE INSTRUCTIONS
Patient Education        Chest Pain: Care Instructions  Your Care Instructions    There are many things that can cause chest pain. Some are not serious and will get better on their own in a few days. But some kinds of chest pain need more testing and treatment. Your doctor may have recommended a follow-up visit in the next 8 to 12 hours. If you are not getting better, you may need more tests or treatment. Even though your doctor has released you, you still need to watch for any problems. The doctor carefully checked you, but sometimes problems can develop later. If you have new symptoms or if your symptoms do not get better, get medical care right away. If you have worse or different chest pain or pressure that lasts more than 5 minutes or you passed out (lost consciousness), call 911 or seek other emergency help right away. A medical visit is only one step in your treatment. Even if you feel better, you still need to do what your doctor recommends, such as going to all suggested follow-up appointments and taking medicines exactly as directed. This will help you recover and help prevent future problems. How can you care for yourself at home? · Rest until you feel better. · Take your medicine exactly as prescribed. Call your doctor if you think you are having a problem with your medicine. · Do not drive after taking a prescription pain medicine. When should you call for help? Call 911 if:    · You passed out (lost consciousness).     · You have severe difficulty breathing.     · You have symptoms of a heart attack. These may include:  ? Chest pain or pressure, or a strange feeling in your chest.  ? Sweating. ? Shortness of breath. ? Nausea or vomiting. ? Pain, pressure, or a strange feeling in your back, neck, jaw, or upper belly or in one or both shoulders or arms. ? Lightheadedness or sudden weakness. ? A fast or irregular heartbeat.   After you call  911, the  may tell you to chew 1 adult-strength or 2 to 4 low-dose aspirin. Wait for an ambulance. Do not try to drive yourself.    Call your doctor today if:    · You have any trouble breathing.     · Your chest pain gets worse.     · You are dizzy or lightheaded, or you feel like you may faint.     · You are not getting better as expected.     · You are having new or different chest pain. Where can you learn more? Go to http://gi-jane.info/  Enter A120 in the search box to learn more about \"Chest Pain: Care Instructions. \"  Current as of: June 26, 2019Content Version: 12.4  © 7902-6355 [employer]. Care instructions adapted under license by [employer] (which disclaims liability or warranty for this information). If you have questions about a medical condition or this instruction, always ask your healthcare professional. [place] [address] any warranty or liability for your use of this information. Patient Education        Musculoskeletal Chest Pain: Care Instructions  Your Care Instructions    Chest pain is not always a sign that something is wrong with your heart or that you have another serious problem. The doctor thinks your chest pain is caused by strained muscles or ligaments, inflamed chest cartilage, or another problem in your chest, rather than by your heart. You may need more tests to find the cause of your chest pain. Follow-up care is a key part of your treatment and safety. Be sure to make and go to all appointments, and call your doctor if you are having problems. It's also a good idea to know your test results and keep a list of the medicines you take. How can you care for yourself at home? · Take pain medicines exactly as directed. ? If the doctor gave you a prescription medicine for pain, take it as prescribed. ? If you are not taking a prescription pain medicine, ask your doctor if you can take an over-the-counter medicine.   · Rest and protect the sore area. · Stop, change, or take a break from any activity that may be causing your pain or soreness. · Put ice or a cold pack on the sore area for 10 to 20 minutes at a time. Try to do this every 1 to 2 hours for the next 3 days (when you are awake) or until the swelling goes down. Put a thin cloth between the ice and your skin. · After 2 or 3 days, apply a heating pad set on low or a warm cloth to the area that hurts. Some doctors suggest that you go back and forth between hot and cold. · Do not wrap or tape your ribs for support. This may cause you to take smaller breaths, which could increase your risk of lung problems. · Mentholated creams such as Bengay or Icy Hot may soothe sore muscles. Follow the instructions on the package. · Follow your doctor's instructions for exercising. · Gentle stretching and massage may help you get better faster. Stretch slowly to the point just before pain begins, and hold the stretch for at least 15 to 30 seconds. Do this 3 or 4 times a day. Stretch just after you have applied heat. · As your pain gets better, slowly return to your normal activities. Any increased pain may be a sign that you need to rest a while longer. When should you call for help? Call 911 anytime you think you may need emergency care. For example, call if:    · You have chest pain or pressure. This may occur with:  ? Sweating. ? Shortness of breath. ? Nausea or vomiting. ? Pain that spreads from the chest to the neck, jaw, or one or both shoulders or arms. ? Dizziness or lightheadedness. ? A fast or uneven pulse. After calling  911, chew 1 adult-strength aspirin. Wait for an ambulance.  Do not try to drive yourself.     · You have sudden chest pain and shortness of breath, or you cough up blood.    Call your doctor now or seek immediate medical care if:    · You have any trouble breathing.     · Your chest pain gets worse.     · Your chest pain occurs consistently with exercise and is relieved by rest.    Watch closely for changes in your health, and be sure to contact your doctor if:    · Your chest pain does not get better after 1 week. Where can you learn more? Go to http://gi-jane.info/  Enter V293 in the search box to learn more about \"Musculoskeletal Chest Pain: Care Instructions. \"  Current as of: June 26, 2019Content Version: 12.4  © 5018-8197 [employer]. Care instructions adapted under license by [employer] (which disclaims liability or warranty for this information). If you have questions about a medical condition or this instruction, always ask your healthcare professional. [address] any warranty or liability for your use of this information.

## 2020-05-01 NOTE — ED TRIAGE NOTES
Pt states he has had bilateral, posterior, lower rib pain since Friday and has had increased sob, especially with exertion, for 2 weeks. Pt states he had same episode of pain 2 wks ago but went away.

## 2020-05-04 ENCOUNTER — PATIENT OUTREACH (OUTPATIENT)
Dept: FAMILY MEDICINE CLINIC | Age: 60
End: 2020-05-04

## 2020-05-06 ENCOUNTER — PATIENT OUTREACH (OUTPATIENT)
Dept: INTERNAL MEDICINE CLINIC | Age: 60
End: 2020-05-06

## 2020-05-06 NOTE — PROGRESS NOTES
Comment alert noted in remote symptom monitoring program. Messaged patient. Patient reports bilateral back/flank pain. When he stands to go to BR the pain is intense and causes SOB. He in now having diarrhea that began yesterday. He will contact his PCP with these symptoms. Confirmed patient will be seen at PCP office today.

## 2020-12-03 ENCOUNTER — HOSPITAL ENCOUNTER (EMERGENCY)
Age: 60
Discharge: HOME | End: 2020-12-04
Attending: EMERGENCY MEDICINE
Payer: MEDICARE

## 2020-12-03 ENCOUNTER — APPOINTMENT (OUTPATIENT)
Dept: CT IMAGING | Age: 60
End: 2020-12-03
Attending: EMERGENCY MEDICINE
Payer: MEDICARE

## 2020-12-03 DIAGNOSIS — R10.9 ACUTE LEFT FLANK PAIN: Primary | ICD-10-CM

## 2020-12-03 LAB
ALBUMIN SERPL-MCNC: 4.1 G/DL (ref 3.5–5)
ALBUMIN/GLOB SERPL: 1.1 {RATIO} (ref 1.1–2.2)
ALP SERPL-CCNC: 67 U/L (ref 45–117)
ALT SERPL-CCNC: 67 U/L (ref 12–78)
ANION GAP SERPL CALC-SCNC: 4 MMOL/L (ref 5–15)
APPEARANCE UR: CLEAR
AST SERPL-CCNC: 26 U/L (ref 15–37)
BACTERIA URNS QL MICRO: NEGATIVE /HPF
BASOPHILS # BLD: 0 K/UL (ref 0–0.1)
BASOPHILS NFR BLD: 0 % (ref 0–1)
BILIRUB SERPL-MCNC: 0.4 MG/DL (ref 0.2–1)
BILIRUB UR QL: NEGATIVE
BUN SERPL-MCNC: 13 MG/DL (ref 6–20)
BUN/CREAT SERPL: 9 (ref 12–20)
CALCIUM SERPL-MCNC: 9.6 MG/DL (ref 8.5–10.1)
CHLORIDE SERPL-SCNC: 106 MMOL/L (ref 97–108)
CO2 SERPL-SCNC: 30 MMOL/L (ref 21–32)
COLOR UR: NORMAL
CREAT SERPL-MCNC: 1.37 MG/DL (ref 0.7–1.3)
DIFFERENTIAL METHOD BLD: NORMAL
EOSINOPHIL # BLD: 0.1 K/UL (ref 0–0.4)
EOSINOPHIL NFR BLD: 1 % (ref 0–7)
EPITH CASTS URNS QL MICRO: NORMAL /LPF
ERYTHROCYTE [DISTWIDTH] IN BLOOD BY AUTOMATED COUNT: 13.2 % (ref 11.5–14.5)
GLOBULIN SER CALC-MCNC: 3.6 G/DL (ref 2–4)
GLUCOSE SERPL-MCNC: 112 MG/DL (ref 65–100)
GLUCOSE UR STRIP.AUTO-MCNC: NEGATIVE MG/DL
HCT VFR BLD AUTO: 44.6 % (ref 36.6–50.3)
HGB BLD-MCNC: 14.6 G/DL (ref 12.1–17)
HGB UR QL STRIP: NEGATIVE
HYALINE CASTS URNS QL MICRO: NORMAL /LPF (ref 0–5)
IMM GRANULOCYTES # BLD AUTO: 0 K/UL (ref 0–0.04)
IMM GRANULOCYTES NFR BLD AUTO: 0 % (ref 0–0.5)
KETONES UR QL STRIP.AUTO: NEGATIVE MG/DL
LEUKOCYTE ESTERASE UR QL STRIP.AUTO: NEGATIVE
LIPASE SERPL-CCNC: 198 U/L (ref 73–393)
LYMPHOCYTES # BLD: 3.1 K/UL (ref 0.8–3.5)
LYMPHOCYTES NFR BLD: 39 % (ref 12–49)
MCH RBC QN AUTO: 29.2 PG (ref 26–34)
MCHC RBC AUTO-ENTMCNC: 32.7 G/DL (ref 30–36.5)
MCV RBC AUTO: 89.2 FL (ref 80–99)
MONOCYTES # BLD: 0.9 K/UL (ref 0–1)
MONOCYTES NFR BLD: 11 % (ref 5–13)
NEUTS SEG # BLD: 3.9 K/UL (ref 1.8–8)
NEUTS SEG NFR BLD: 49 % (ref 32–75)
NITRITE UR QL STRIP.AUTO: NEGATIVE
NRBC # BLD: 0 K/UL (ref 0–0.01)
NRBC BLD-RTO: 0 PER 100 WBC
PH UR STRIP: 6.5 [PH] (ref 5–8)
PLATELET # BLD AUTO: 251 K/UL (ref 150–400)
PMV BLD AUTO: 9.5 FL (ref 8.9–12.9)
POTASSIUM SERPL-SCNC: 4.3 MMOL/L (ref 3.5–5.1)
PROT SERPL-MCNC: 7.7 G/DL (ref 6.4–8.2)
PROT UR STRIP-MCNC: NEGATIVE MG/DL
RBC # BLD AUTO: 5 M/UL (ref 4.1–5.7)
RBC #/AREA URNS HPF: NORMAL /HPF (ref 0–5)
SODIUM SERPL-SCNC: 140 MMOL/L (ref 136–145)
SP GR UR REFRACTOMETRY: 1.01 (ref 1–1.03)
UA: UC IF INDICATED,UAUC: NORMAL
UROBILINOGEN UR QL STRIP.AUTO: 1 EU/DL (ref 0.2–1)
WBC # BLD AUTO: 8 K/UL (ref 4.1–11.1)
WBC URNS QL MICRO: NORMAL /HPF (ref 0–4)

## 2020-12-03 PROCEDURE — 80053 COMPREHEN METABOLIC PANEL: CPT

## 2020-12-03 PROCEDURE — 96374 THER/PROPH/DIAG INJ IV PUSH: CPT

## 2020-12-03 PROCEDURE — 81001 URINALYSIS AUTO W/SCOPE: CPT

## 2020-12-03 PROCEDURE — 99284 EMERGENCY DEPT VISIT MOD MDM: CPT

## 2020-12-03 PROCEDURE — 36415 COLL VENOUS BLD VENIPUNCTURE: CPT

## 2020-12-03 PROCEDURE — 74176 CT ABD & PELVIS W/O CONTRAST: CPT

## 2020-12-03 PROCEDURE — 83690 ASSAY OF LIPASE: CPT

## 2020-12-03 PROCEDURE — 85025 COMPLETE CBC W/AUTO DIFF WBC: CPT

## 2020-12-03 RX ORDER — KETOROLAC TROMETHAMINE 30 MG/ML
15 INJECTION, SOLUTION INTRAMUSCULAR; INTRAVENOUS
Status: COMPLETED | OUTPATIENT
Start: 2020-12-03 | End: 2020-12-04

## 2020-12-04 VITALS
SYSTOLIC BLOOD PRESSURE: 142 MMHG | OXYGEN SATURATION: 95 % | WEIGHT: 248.24 LBS | RESPIRATION RATE: 16 BRPM | TEMPERATURE: 98.4 F | DIASTOLIC BLOOD PRESSURE: 87 MMHG | HEART RATE: 75 BPM | HEIGHT: 66 IN | BODY MASS INDEX: 39.9 KG/M2

## 2020-12-04 PROCEDURE — 74011250636 HC RX REV CODE- 250/636: Performed by: EMERGENCY MEDICINE

## 2020-12-04 PROCEDURE — 96374 THER/PROPH/DIAG INJ IV PUSH: CPT

## 2020-12-04 RX ORDER — NAPROXEN 500 MG/1
500 TABLET ORAL
Qty: 20 TAB | Refills: 0 | OUTPATIENT
Start: 2020-12-04 | End: 2021-01-29

## 2020-12-04 RX ADMIN — KETOROLAC TROMETHAMINE 15 MG: 30 INJECTION, SOLUTION INTRAMUSCULAR; INTRAVENOUS at 00:16

## 2020-12-04 RX ADMIN — SODIUM CHLORIDE 1000 ML: 900 INJECTION, SOLUTION INTRAVENOUS at 00:16

## 2020-12-04 NOTE — ED NOTES
Assumed care of pt from triage. Pt CC of left flank pain. Pt a&ox4, resp even ad unlabored, skin warm and dry, color approp to ethnicicty , rates pain 9/10. Pt on monitor x 2  . VSS. Call bell in reach. Will continue to monitor.

## 2020-12-04 NOTE — ED PROVIDER NOTES
EMERGENCY DEPARTMENT HISTORY AND PHYSICAL EXAM     ------------------------------------------------------------------------------------------------------  Please note that this dictation was completed with Pluck, the [employer] voice recognition software. Quite often unanticipated grammatical, syntax, homophones, and other interpretive errors are inadvertently transcribed by the computer software. Please disregard these errors. Please excuse any errors that have escaped final proofreading.  -----------------------------------------------------------------------------------------------------------------    Date: 12/3/2020  Patient Name: Janae Ash    History of Presenting Illness     Chief Complaint   Patient presents with    Flank Pain     L sided, pt denies urinary symptoms       History Provided By: Patient    HPI: Janae Ash is a 61 y.o. male, with significant pmhx of HTN, DM, RA, DDD, who presents via private vehicle to the ED with c/o L sided flank pain for intermittently for 3 days, now sore to touch. Reports taking naproxen without much relief. Notes having intermittent dark urine over the last several days as well. Pt  specifically denies any recent fevers, chills, CP, SOB, nausea, vomiting, diarrhea, changes in BM, or headache. Denies any recent heavy lifting, new exercise activities or acute event at onset of pain notes movement makes his pain worse without any noted relieving factors    PCP: Ryan Edmonds,     Social Hx: denies tobacco, denies EtOH, denies Illicit Drugs     There are no other complaints, changes, or physical findings at this time.      Allergies   Allergen Reactions    Uloric [Febuxostat] Anaphylaxis    Allopurinol Hives         Current Facility-Administered Medications   Medication Dose Route Frequency Provider Last Rate Last Dose    sodium chloride 0.9 % bolus infusion 1,000 mL  1,000 mL IntraVENous NOW Tala Joseph MD 1,000 mL/hr at 12/04/20 0016 1,000 mL at 12/04/20 0016     Current Outpatient Medications   Medication Sig Dispense Refill    naproxen (NAPROSYN) 500 mg tablet Take 1 Tab by mouth every twelve (12) hours as needed for Pain. 20 Tab 0    atorvastatin (LIPITOR) 10 mg tablet Take 10 mg by mouth daily.  dulaglutide (TRULICITY) 1.5 EP/7.6 mL sub-q pen 1.5 mg by SubCUTAneous route every seven (7) days.  NIFEdipine ER (ADALAT CC) 90 mg ER tablet Take 90 mg by mouth daily. 0    melatonin 5 mg cap capsule Take 5 mg by mouth nightly.  traZODone (DESYREL) 50 mg tablet Take 50 mg by mouth as needed for Sleep.  irbesartan (AVAPRO) 300 mg tablet Take 300 mg by mouth daily.  aspirin 81 mg chewable tablet Take 81 mg by mouth daily.  metFORMIN (GLUCOPHAGE) 1,000 mg tablet Take 1,000 mg by mouth two (2) times daily (with meals).  colchicine 0.6 mg tablet Take 0.6 mg by mouth daily.  tamsulosin (FLOMAX) 0.4 mg capsule Take 0.4 mg by mouth daily. Past History     Past Medical History:  Past Medical History:   Diagnosis Date    Arthritis     Chronic pain     Diabetes ([facility] [address]     GERD (gastroesophageal reflux disease)     Gout     Hypertension     Other ill-defined conditions(799.89)     gout       Past Surgical History:  Past Surgical History:   Procedure Laterality Date    HX APPENDECTOMY      HX ORTHOPAEDIC      RIGHT knee scope       Family History:  Family History   Problem Relation Age of Onset    Hypertension Mother     Diabetes Mother     Heart Disease Father        Social History:  Social History     Tobacco Use    Smoking status: Never Smoker    Smokeless tobacco: Never Used   Substance Use Topics    Alcohol use: Yes     Comment: occassionally \"weekends\"    Drug use: No       Allergies: Allergies   Allergen Reactions    Uloric [Febuxostat] Anaphylaxis    Allopurinol Hives         Review of Systems   Review of Systems   Constitutional: Negative for chills and fever. HENT: Negative. Eyes: Negative. Respiratory: Negative for cough, chest tightness and shortness of breath. Cardiovascular: Negative for chest pain and leg swelling. Gastrointestinal: Negative for abdominal pain, diarrhea, nausea and vomiting. Endocrine: Negative. Genitourinary: Positive for flank pain. Negative for difficulty urinating and dysuria. Musculoskeletal: Negative for myalgias. Skin: Negative. Neurological: Negative. Psychiatric/Behavioral: Negative. All other systems reviewed and are negative. Physical Exam   Physical Exam  Vitals signs and nursing note reviewed. Constitutional:       General: He is not in acute distress. Appearance: He is well-developed. He is obese. He is not diaphoretic. HENT:      Head: Normocephalic and atraumatic. Nose: Nose normal.      Mouth/Throat:      Pharynx: No oropharyngeal exudate. Eyes:      Conjunctiva/sclera: Conjunctivae normal.      Pupils: Pupils are equal, round, and reactive to light. Neck:      Musculoskeletal: Normal range of motion and neck supple. Vascular: No JVD. Cardiovascular:      Rate and Rhythm: Normal rate and regular rhythm. Heart sounds: Normal heart sounds. No murmur. No friction rub. Pulmonary:      Effort: Pulmonary effort is normal. No respiratory distress. Breath sounds: Normal breath sounds. No stridor. No wheezing or rales. Abdominal:      General: Bowel sounds are normal. There is no distension. Palpations: Abdomen is soft. Tenderness: There is no abdominal tenderness. There is left CVA tenderness. There is no rebound. Musculoskeletal: Normal range of motion. General: No tenderness. Thoracic back: He exhibits pain. Back:    Skin:     General: Skin is warm and dry. Findings: No rash. Neurological:      Mental Status: He is alert and oriented to person, place, and time. Cranial Nerves: No cranial nerve deficit.    Psychiatric:         Speech: Speech normal. Behavior: Behavior normal.         Thought Content: Thought content normal.         Judgment: Judgment normal.           Diagnostic Study Results     Labs -     Recent Results (from the past 12 hour(s))   CBC WITH AUTOMATED DIFF    Collection Time: 12/03/20  6:20 PM   Result Value Ref Range    WBC 8.0 4.1 - 11.1 K/uL    RBC 5.00 4. 10 - 5.70 M/uL    HGB 14.6 12.1 - 17.0 g/dL    HCT 44.6 36.6 - 50.3 %    MCV 89.2 80.0 - 99.0 FL    MCH 29.2 26.0 - 34.0 PG    MCHC 32.7 30.0 - 36.5 g/dL    RDW 13.2 11.5 - 14.5 %    PLATELET 286 461 - 972 K/uL    MPV 9.5 8.9 - 12.9 FL    NRBC 0.0 0  WBC    ABSOLUTE NRBC 0.00 0.00 - 0.01 K/uL    NEUTROPHILS 49 32 - 75 %    LYMPHOCYTES 39 12 - 49 %    MONOCYTES 11 5 - 13 %    EOSINOPHILS 1 0 - 7 %    BASOPHILS 0 0 - 1 %    IMMATURE GRANULOCYTES 0 0.0 - 0.5 %    ABS. NEUTROPHILS 3.9 1.8 - 8.0 K/UL    ABS. LYMPHOCYTES 3.1 0.8 - 3.5 K/UL    ABS. MONOCYTES 0.9 0.0 - 1.0 K/UL    ABS. EOSINOPHILS 0.1 0.0 - 0.4 K/UL    ABS. BASOPHILS 0.0 0.0 - 0.1 K/UL    ABS. IMM. GRANS. 0.0 0.00 - 0.04 K/UL    DF AUTOMATED     METABOLIC PANEL, COMPREHENSIVE    Collection Time: 12/03/20  6:20 PM   Result Value Ref Range    Sodium 140 136 - 145 mmol/L    Potassium 4.3 3.5 - 5.1 mmol/L    Chloride 106 97 - 108 mmol/L    CO2 30 21 - 32 mmol/L    Anion gap 4 (L) 5 - 15 mmol/L    Glucose 112 (H) 65 - 100 mg/dL    BUN 13 6 - 20 MG/DL    Creatinine 1.37 (H) 0.70 - 1.30 MG/DL    BUN/Creatinine ratio 9 (L) 12 - 20      GFR est AA >60 >60 ml/min/1.73m2    GFR est non-AA 53 (L) >60 ml/min/1.73m2    Calcium 9.6 8.5 - 10.1 MG/DL    Bilirubin, total 0.4 0.2 - 1.0 MG/DL    ALT (SGPT) 67 12 - 78 U/L    AST (SGOT) 26 15 - 37 U/L    Alk.  phosphatase 67 45 - 117 U/L    Protein, total 7.7 6.4 - 8.2 g/dL    Albumin 4.1 3.5 - 5.0 g/dL    Globulin 3.6 2.0 - 4.0 g/dL    A-G Ratio 1.1 1.1 - 2.2     LIPASE    Collection Time: 12/03/20  6:20 PM   Result Value Ref Range    Lipase 198 73 - 393 U/L   URINALYSIS W/ REFLEX CULTURE Collection Time: 12/03/20  6:20 PM    Specimen: Urine   Result Value Ref Range    Color YELLOW/STRAW      Appearance CLEAR CLEAR      Specific gravity 1.008 1.003 - 1.030      pH (UA) 6.5 5.0 - 8.0      Protein Negative NEG mg/dL    Glucose Negative NEG mg/dL    Ketone Negative NEG mg/dL    Bilirubin Negative NEG      Blood Negative NEG      Urobilinogen 1.0 0.2 - 1.0 EU/dL    Nitrites Negative NEG      Leukocyte Esterase Negative NEG      WBC 0-4 0 - 4 /hpf    RBC 0-5 0 - 5 /hpf    Epithelial cells FEW FEW /lpf    Bacteria Negative NEG /hpf    UA:UC IF INDICATED CULTURE NOT INDICATED BY UA RESULT CNI      Hyaline cast 0-2 0 - 5 /lpf       Radiologic Studies -   CT ABD PELV WO CONT   Final Result   IMPRESSION:   No acute findings. Enlarged prostate. CT Results  (Last 48 hours)               12/04/20 0005  CT ABD PELV WO CONT Final result    Impression:  IMPRESSION:   No acute findings. Enlarged prostate. Narrative:  EXAM: CT ABD PELV WO CONT       INDICATION: L flank pain       COMPARISON: February 2020       CONTRAST:  None. TECHNIQUE:    Thin axial images were obtained through the abdomen and pelvis. Coronal and   sagittal reformats were generated. Oral contrast was not administered. CT dose   reduction was achieved through use of a standardized protocol tailored for this   examination and automatic exposure control for dose modulation. The absence of intravenous contrast material reduces the sensitivity for   evaluation of the vasculature and solid organs. FINDINGS:    LOWER THORAX: No significant abnormality in the incidentally imaged lower chest.   LIVER: No mass. BILIARY TREE: Gallbladder is within normal limits. CBD is not dilated. SPLEEN: within normal limits. PANCREAS: No focal abnormality. ADRENALS: Unremarkable. KIDNEYS/URETERS: Left renal cyst.   STOMACH: Unremarkable. SMALL BOWEL: No dilatation or wall thickening. COLON: Moderate colonic stool.    APPENDIX: Not identified. PERITONEUM: No ascites or pneumoperitoneum. RETROPERITONEUM: No lymphadenopathy or aortic aneurysm. REPRODUCTIVE ORGANS: Enlarged prostate. URINARY BLADDER: No mass or calculus. BONES: Narrowing L4-5. ABDOMINAL WALL: No mass or hernia. ADDITIONAL COMMENTS: N/A               CXR Results  (Last 48 hours)    None            Medical Decision Making   I am the first provider for this patient. I reviewed the vital signs, available nursing notes, past medical history, past surgical history, family history and social history. Vital Signs-Reviewed the patient's vital signs. Patient Vitals for the past 12 hrs:   Temp Pulse Resp BP SpO2   12/03/20 2330 98.4 °F (36.9 °C) 75 16 (!) 158/100 95 %   12/03/20 2108 98.4 °F (36.9 °C) 76 18 (!) 160/103 94 %   12/03/20 1815 98.5 °F (36.9 °C) 86 18 (!) 153/97 99 %       Pulse Oximetry Analysis - 94% on RA normal    Records Reviewed/Interpretted: Nursing Notes from triage and Old Medical Records,     Provider Notes (Medical Decision Making):     DDX:  UTI, pyelonephritis, kidney stone, muscle spasm, muscle strain    Plan:  Labs, UA, CT scan, analgesic    Impression:  Left flank pain    ED Course:   Initial assessment performed. The patients presenting problems have been discussed, and they are in agreement with the care plan formulated and outlined with them. I have encouraged them to ask questions as they arise throughout their visit. I reviewed our electronic medical record system for any past medical records that were available that may contribute to the patients current condition, the nursing notes and and vital signs from today's visit  Nursing notes will be reviewed as they become available in realtime while the pt has been in the ED. Anabelle Garcia MD      I personally reviewed/interpreted pt's imaging. Agree with official read by radiology as noted above.   Anabelle Garcia MD      12:53 AM  Progress note:  Pt noted to be feeling better, ready for discharge. Discussed lab and imaging findings with pt, specifically noting creatinine of 1.37 for which he is followed by his primary care doctor. Pt will follow up with primary care as instructed. All questions have been answered, pt voiced understanding and agreement with plan. ble side effects of the medications. Specific return precautions provided in addition to instructions for pt to return to the ED immediately should sx worsen at any time. Bernarda Carbone MD             Critical Care Time:     none      Diagnosis     Clinical Impression:   1. Acute left flank pain        PLAN:  1. Current Discharge Medication List      START taking these medications    Details   naproxen (NAPROSYN) 500 mg tablet Take 1 Tab by mouth every twelve (12) hours as needed for Pain. Qty: 20 Tab, Refills: 0           2. Follow-up Information     Follow up With Specialties Details Why Contact Evelio Nino DO Family Medicine, Internal Medicine, Geriatric Medicine Schedule an appointment as soon as possible for a visit in 2 days  [id number] [facility] [place] [address]  [phone]          Return to ED if worse     Disposition:    12:53 AM   The patient's results have been reviewed with family and/or caregiver. They verbally convey their understanding and agreement of the patient's signs, symptoms, diagnosis, treatment and prognosis and additionally agree to follow up as recommended in the discharge instructions or to return to the Emergency Room should the patient's condition change prior to their follow-up appointment. The family and/or caregiver verbally agrees with the care-plan and all of their questions have been answered. The discharge instructions have also been provided to the them with educational information regarding the patient's diagnosis as well a list of reasons why the patient would want to return to the ER prior to their follow-up appointment should their condition change.   Daniel Lopez. Carrie Cavanaugh MD

## 2020-12-04 NOTE — DISCHARGE INSTRUCTIONS
Patient Education        Flank Pain: Care Instructions  Your Care Instructions  Flank pain is pain on the side of the back just below the rib cage and above the waist. It can be on one or both sides. Flank pain has many possible causes, including a kidney stone, a urinary tract infection, or back strain. Flank pain may get better on its own. But don't ignore new symptoms, such as fever, nausea and vomiting, urination problems, pain that gets worse, and dizziness. These may be signs of a more serious problem. You may have to have tests or other treatment. Follow-up care is a key part of your treatment and safety. Be sure to make and go to all appointments, and call your doctor if you are having problems. It's also a good idea to know your test results and keep a list of the medicines you take. How can you care for yourself at home? · Rest until you feel better. · Take pain medicines exactly as directed. ? If the doctor gave you a prescription medicine for pain, take it as prescribed. ? If you are not taking a prescription pain medicine, ask your doctor if you can take an over-the-counter pain medicine, such as acetaminophen (Tylenol), ibuprofen (Advil, Motrin), or naproxen (Aleve). Read and follow all instructions on the label. · If your doctor prescribed antibiotics, take them as directed. Do not stop taking them just because you feel better. You need to take the full course of antibiotics. · To apply heat, put a warm water bottle, a heating pad set on low, or a warm cloth on the painful area. Do not go to sleep with a heating pad on your skin. · To prevent dehydration, drink plenty of fluids, enough so that your urine is light yellow or clear like water. Choose water and other caffeine-free clear liquids until you feel better. If you have kidney, heart, or liver disease and have to limit fluids, talk with your doctor before you increase the amount of fluids you drink. When should you call for help? Call your doctor now or seek immediate medical care if:    · Your flank pain gets worse.     · You have new symptoms, such as fever, nausea, or vomiting.     · You have symptoms of a urinary problem. For example:  ? You have blood or pus in your urine. ? You have chills or body aches. ? It hurts to urinate. ? You have groin or belly pain. Watch closely for changes in your health, and be sure to contact your doctor if you do not get better as expected. Where can you learn more? Go to http://www.patel.com/  Enter S191 in the search box to learn more about \"Flank Pain: Care Instructions. \"  Current as of: June 26, 2019               Content Version: 12.6  © 2914-9048 [employer]. Care instructions adapted under license by [employer] (which disclaims liability or warranty for this information). If you have questions about a medical condition or this instruction, always ask your healthcare professional. [place] [address] any warranty or liability for your use of this information.

## 2021-01-29 ENCOUNTER — HOSPITAL ENCOUNTER (EMERGENCY)
Age: 61
Discharge: HOME | End: 2021-01-29
Attending: EMERGENCY MEDICINE
Payer: MEDICARE

## 2021-01-29 ENCOUNTER — APPOINTMENT (OUTPATIENT)
Dept: GENERAL RADIOLOGY | Age: 61
End: 2021-01-29
Attending: EMERGENCY MEDICINE
Payer: MEDICARE

## 2021-01-29 VITALS
DIASTOLIC BLOOD PRESSURE: 90 MMHG | WEIGHT: 246 LBS | HEIGHT: 66 IN | TEMPERATURE: 98.3 F | HEART RATE: 87 BPM | RESPIRATION RATE: 18 BRPM | SYSTOLIC BLOOD PRESSURE: 143 MMHG | BODY MASS INDEX: 39.53 KG/M2 | OXYGEN SATURATION: 96 %

## 2021-01-29 DIAGNOSIS — M25.511 ACUTE PAIN OF RIGHT SHOULDER: Primary | ICD-10-CM

## 2021-01-29 PROCEDURE — 74011250636 HC RX REV CODE- 250/636: Performed by: PHYSICIAN ASSISTANT

## 2021-01-29 PROCEDURE — 96372 THER/PROPH/DIAG INJ SC/IM: CPT

## 2021-01-29 PROCEDURE — 99282 EMERGENCY DEPT VISIT SF MDM: CPT

## 2021-01-29 PROCEDURE — 74011250637 HC RX REV CODE- 250/637: Performed by: PHYSICIAN ASSISTANT

## 2021-01-29 PROCEDURE — 73030 X-RAY EXAM OF SHOULDER: CPT

## 2021-01-29 RX ORDER — OXYCODONE HYDROCHLORIDE 5 MG/1
10 TABLET ORAL
Status: COMPLETED | OUTPATIENT
Start: 2021-01-29 | End: 2021-01-29

## 2021-01-29 RX ORDER — DIAZEPAM 5 MG/1
5 TABLET ORAL
Status: COMPLETED | OUTPATIENT
Start: 2021-01-29 | End: 2021-01-29

## 2021-01-29 RX ORDER — METHOCARBAMOL 750 MG/1
1500 TABLET, FILM COATED ORAL 3 TIMES DAILY
Qty: 24 TAB | Refills: 0 | Status: SHIPPED | OUTPATIENT
Start: 2021-01-29 | End: 2021-02-02

## 2021-01-29 RX ORDER — DICLOFENAC SODIUM 75 MG/1
75 TABLET, DELAYED RELEASE ORAL 2 TIMES DAILY
Qty: 20 TAB | Refills: 0 | Status: SHIPPED | OUTPATIENT
Start: 2021-01-29 | End: 2021-02-08

## 2021-01-29 RX ORDER — KETOROLAC TROMETHAMINE 30 MG/ML
30 INJECTION, SOLUTION INTRAMUSCULAR; INTRAVENOUS
Status: COMPLETED | OUTPATIENT
Start: 2021-01-29 | End: 2021-01-29

## 2021-01-29 RX ORDER — ONDANSETRON 4 MG/1
4 TABLET, ORALLY DISINTEGRATING ORAL
Status: COMPLETED | OUTPATIENT
Start: 2021-01-29 | End: 2021-01-29

## 2021-01-29 RX ORDER — LIDOCAINE 50 MG/G
PATCH TOPICAL
Qty: 5 EACH | Refills: 0 | Status: SHIPPED | OUTPATIENT
Start: 2021-01-29

## 2021-01-29 RX ORDER — OXYCODONE HYDROCHLORIDE 5 MG/1
5 TABLET ORAL
Qty: 12 TAB | Refills: 0 | Status: SHIPPED | OUTPATIENT
Start: 2021-01-29 | End: 2021-02-01

## 2021-01-29 RX ADMIN — OXYCODONE 10 MG: 5 TABLET ORAL at 17:49

## 2021-01-29 RX ADMIN — ONDANSETRON 4 MG: 4 TABLET, ORALLY DISINTEGRATING ORAL at 17:49

## 2021-01-29 RX ADMIN — KETOROLAC TROMETHAMINE 30 MG: 30 INJECTION, SOLUTION INTRAMUSCULAR at 16:41

## 2021-01-29 RX ADMIN — DIAZEPAM 5 MG: 5 TABLET ORAL at 16:41

## 2021-01-29 NOTE — DISCHARGE INSTRUCTIONS
Rest, gentle stretching. Follow up with your orthopedist as scheduled. Return to the Emergency Dept for any concerns.

## 2021-01-29 NOTE — LETTER
[address] 
[facility] EMERGENCY DEPT 
[address] [facility] [place] [address] 
[phone] Work/School Note Date: 1/29/2021 To Whom It May concern: 
 
Tommy Zepeda was seen and treated today in the emergency room. He may return to work in 2 to 3 days, as symptoms improve. Sincerely, David Cook

## 2021-01-29 NOTE — ED PROVIDER NOTES
EMERGENCY DEPARTMENT HISTORY AND PHYSICAL EXAM      Date: 1/29/2021  Patient Name: Efrain Spence    History of Presenting Illness     Chief Complaint   Patient presents with    Shoulder Pain     onset with shoulder pain on Friday, not better with home remedies, denies injury       History Provided By: Patient    HPI: Efrain Spence, 61 y.o. male presents ambulatory to the Emergency Dept with c/o progressively worsening R shoulder pain x 1 week. Pt denied any known injury, strain, or recent change in his physical routine. He states he has had some issues with shoulder pain previously and advised it was related to arthritis. He states he has an appt to see a specialist on Wednesday, but could not wait any longer due to the intensity of the pain. He denied chest pain or shortness of breath. No recent cough or chest congestion. No rash/lesion. He denied numbness/tingling. Pt states the pain radiates into his neck and down his arm. No numbness/weakness. Pt is o/w healthy without fever, chills, cough, congestion, ST, shortness of breath, chest pain, N/V/D. Chief Complaint: R shoulder pain  Duration: 5-6 Days  Timing:  Acute  Location: R shoulder  Quality: Aching  Severity: Severe  Modifying Factors: increased pain with movement  Associated Symptoms: pain radiates into R neck/shoulder        There are no other complaints, changes, or physical findings at this time. PCP: Diana Khan, DO    Current Outpatient Medications   Medication Sig Dispense Refill    oxyCODONE IR (ROXICODONE) 5 mg immediate release tablet Take 1 Tab by mouth every four (4) hours as needed for Pain for up to 3 days. Max Daily Amount: 30 mg. 12 Tab 0    methocarbamoL (ROBAXIN) 750 mg tablet Take 2 Tabs by mouth three (3) times daily for 4 days. 24 Tab 0    lidocaine (LIDODERM) 5 % Apply patch to the affected area for 12 hours a day and remove for 12 hours a day.  5 Each 0    diclofenac EC (VOLTAREN) 75 mg EC tablet Take 1 Tab by mouth two (2) times a day for 10 days. 20 Tab 0    atorvastatin (LIPITOR) 10 mg tablet Take 10 mg by mouth daily.  dulaglutide (TRULICITY) 1.5 NP/5.2 mL sub-q pen 1.5 mg by SubCUTAneous route every seven (7) days.  colchicine 0.6 mg tablet Take 0.6 mg by mouth daily.  NIFEdipine ER (ADALAT CC) 90 mg ER tablet Take 90 mg by mouth daily. 0    melatonin 5 mg cap capsule Take 5 mg by mouth nightly.  traZODone (DESYREL) 50 mg tablet Take 50 mg by mouth as needed for Sleep.  irbesartan (AVAPRO) 300 mg tablet Take 300 mg by mouth daily.  tamsulosin (FLOMAX) 0.4 mg capsule Take 0.4 mg by mouth daily.  aspirin 81 mg chewable tablet Take 81 mg by mouth daily.  metFORMIN (GLUCOPHAGE) 1,000 mg tablet Take 1,000 mg by mouth two (2) times daily (with meals). Past History     Past Medical History:  Past Medical History:   Diagnosis Date    Arthritis     Chronic pain     Diabetes ([facility] [address]     GERD (gastroesophageal reflux disease)     Gout     Hypertension     Other ill-defined conditions(799.89)     gout       Past Surgical History:  Past Surgical History:   Procedure Laterality Date    HX APPENDECTOMY      HX ORTHOPAEDIC      RIGHT knee scope       Family History:  Family History   Problem Relation Age of Onset    Hypertension Mother     Diabetes Mother     Heart Disease Father        Social History:  Social History     Tobacco Use    Smoking status: Never Smoker    Smokeless tobacco: Never Used   Substance Use Topics    Alcohol use: Yes     Comment: occassionally \"weekends\"    Drug use: No       Allergies: Allergies   Allergen Reactions    Uloric [Febuxostat] Anaphylaxis    Allopurinol Hives         Review of Systems   Review of Systems   Constitutional: Negative for chills and fever. HENT: Negative for congestion, rhinorrhea and sore throat. Respiratory: Negative for cough and shortness of breath. Cardiovascular: Negative for chest pain and palpitations. Gastrointestinal: Negative for diarrhea, nausea and vomiting. Genitourinary: Negative for dysuria and hematuria. Musculoskeletal: Positive for arthralgias, back pain and neck pain. Negative for neck stiffness. Skin: Negative for rash and wound. Allergic/Immunologic: Negative for food allergies and immunocompromised state. Neurological: Negative for weakness, numbness and headaches. Hematological: Negative for adenopathy. Does not bruise/bleed easily. Psychiatric/Behavioral: Negative for agitation and confusion. All other systems reviewed and are negative. Physical Exam   Physical Exam  Vitals signs and nursing note reviewed. Constitutional:       General: He is not in acute distress. Appearance: Normal appearance. He is well-developed and normal weight. He is not ill-appearing or diaphoretic. HENT:      Head: Normocephalic and atraumatic. Nose: Nose normal. No congestion or rhinorrhea. Mouth/Throat:      Pharynx: No oropharyngeal exudate. Eyes:      General: No scleral icterus. Right eye: No discharge. Left eye: No discharge. Conjunctiva/sclera: Conjunctivae normal.      Pupils: Pupils are equal, round, and reactive to light. Neck:      Musculoskeletal: Normal range of motion and neck supple. Thyroid: No thyromegaly. Vascular: No JVD. Trachea: No tracheal deviation. Comments: Mild R paracervical tenderness, no midline cervical spinal tenderness  Cardiovascular:      Rate and Rhythm: Normal rate and regular rhythm. Pulses: Normal pulses. Heart sounds: Normal heart sounds. Pulmonary:      Effort: Pulmonary effort is normal. No respiratory distress. Breath sounds: Normal breath sounds. No wheezing. Abdominal:      Palpations: Abdomen is soft. Tenderness: There is no abdominal tenderness. There is no right CVA tenderness, left CVA tenderness, guarding or rebound.    Musculoskeletal:         General: Tenderness (.[id number]) present. Comments: Decreased A/P ROM to R paracervical and R shoulder due to tenderness with palpation/movement, no deformity, no crepitus, no erythema/rash/lesion/heat. 2+ distal pulses, NVI, sensation grossly intact to light touch. Skin:     General: Skin is warm and dry. Findings: No bruising. Neurological:      General: No focal deficit present. Mental Status: He is alert and oriented to person, place, and time. Sensory: No sensory deficit. Motor: No weakness or abnormal muscle tone. Coordination: Coordination normal.   Psychiatric:         Mood and Affect: Mood normal.         Behavior: Behavior normal.         Judgment: Judgment normal.         Diagnostic Study Results     Labs -   No results found for this or any previous visit (from the past 12 hour(s)). Radiologic Studies -   XR SHOULDER RT AP/LAT MIN 2 V   Final Result   No acute abnormality. Medical Decision Making   I am the first provider for this patient. I reviewed the vital signs, available nursing notes, past medical history, past surgical history, family history and social history. Vital Signs-Reviewed the patient's vital signs. Patient Vitals for the past 12 hrs:   Temp Pulse Resp BP SpO2   01/29/21 1459 98.3 °F (36.8 °C) 87 18 (!) 143/90 96 %           Records Reviewed: Nursing Notes, Old Medical Records, Previous Radiology Studies and Previous Laboratory Studies    Provider Notes (Medical Decision Making):   Strain, spasm, DJD/OA, rotator cuff d/o    ED Course:   Initial assessment performed. The patients presenting problems have been discussed, and they are in agreement with the care plan formulated and outlined with them. I have encouraged them to ask questions as they arise throughout their visit. DISCHARGE NOTE:  The care plan has been outline with the patient and/or family, who verbally conveyed understanding and agreement. Available results have been reviewed. Patient and/or family understand the follow up plan as outlined and discharge instructions. Should their condition deterioration at any time after discharge the patient agrees to return, follow up sooner than outlined or seek medical assistance at the closest Emergency Room as soon as possible. Questions have been answered. Discharge instructions and educational information regarding the patient's diagnosis as well a list of reasons why the patient would want to seek immediate medical attention, should their condition change, were reviewed directly with the patient/family        PLAN:  1. Current Discharge Medication List      START taking these medications    Details   oxyCODONE IR (ROXICODONE) 5 mg immediate release tablet Take 1 Tab by mouth every four (4) hours as needed for Pain for up to 3 days. Max Daily Amount: 30 mg.  Qty: 12 Tab, Refills: 0    Associated Diagnoses: Acute pain of right shoulder      methocarbamoL (ROBAXIN) 750 mg tablet Take 2 Tabs by mouth three (3) times daily for 4 days. Qty: 24 Tab, Refills: 0      lidocaine (LIDODERM) 5 % Apply patch to the affected area for 12 hours a day and remove for 12 hours a day. Qty: 5 Each, Refills: 0      diclofenac EC (VOLTAREN) 75 mg EC tablet Take 1 Tab by mouth two (2) times a day for 10 days. Qty: 20 Tab, Refills: 0           2. Follow-up Information     Follow up With Specialties Details Why Contact Info    Your Orthopedist as scheduled        [facility]  If symptoms worsen [address] [place] [address]  [phone]        Return to ED if worse     Diagnosis     Clinical Impression:   1.  Acute pain of right shoulder

## 2021-01-29 NOTE — ED NOTES
Zhane Monsivais RN reviewed discharge instructions with the patient. The patient verbalized understanding. All questions and concerns were addressed. The patient is discharged via wheelchair in the care of family members with instructions and prescriptions in hand. Pt is alert and oriented x 4. Respirations are clear and unlabored.

## 2021-04-20 ENCOUNTER — HOSPITAL ENCOUNTER (EMERGENCY)
Age: 61
Discharge: HOME | End: 2021-04-21
Attending: STUDENT IN AN ORGANIZED HEALTH CARE EDUCATION/TRAINING PROGRAM
Payer: MEDICARE

## 2021-04-20 VITALS
TEMPERATURE: 98.9 F | HEART RATE: 72 BPM | SYSTOLIC BLOOD PRESSURE: 129 MMHG | OXYGEN SATURATION: 100 % | RESPIRATION RATE: 14 BRPM | WEIGHT: 243.39 LBS | DIASTOLIC BLOOD PRESSURE: 83 MMHG | BODY MASS INDEX: 39.28 KG/M2

## 2021-04-20 DIAGNOSIS — S76.212A INGUINAL STRAIN, LEFT, INITIAL ENCOUNTER: ICD-10-CM

## 2021-04-20 DIAGNOSIS — M75.41 SHOULDER IMPINGEMENT SYNDROME, RIGHT: Primary | ICD-10-CM

## 2021-04-20 DIAGNOSIS — R07.89 MUSCULOSKELETAL CHEST PAIN: ICD-10-CM

## 2021-04-20 DIAGNOSIS — M54.9 MUSCULOSKELETAL BACK PAIN: ICD-10-CM

## 2021-04-20 LAB
ALBUMIN SERPL-MCNC: 4 G/DL (ref 3.5–5)
ALBUMIN/GLOB SERPL: 1.1 {RATIO} (ref 1.1–2.2)
ALP SERPL-CCNC: 61 U/L (ref 45–117)
ALT SERPL-CCNC: 47 U/L (ref 12–78)
ANION GAP SERPL CALC-SCNC: 3 MMOL/L (ref 5–15)
APPEARANCE UR: CLEAR
AST SERPL-CCNC: 22 U/L (ref 15–37)
BACTERIA URNS QL MICRO: NEGATIVE /HPF
BASOPHILS # BLD: 0 K/UL (ref 0–0.1)
BASOPHILS NFR BLD: 0 % (ref 0–1)
BILIRUB SERPL-MCNC: 0.4 MG/DL (ref 0.2–1)
BILIRUB UR QL: NEGATIVE
BUN SERPL-MCNC: 14 MG/DL (ref 6–20)
BUN/CREAT SERPL: 11 (ref 12–20)
CALCIUM SERPL-MCNC: 9.3 MG/DL (ref 8.5–10.1)
CHLORIDE SERPL-SCNC: 106 MMOL/L (ref 97–108)
CO2 SERPL-SCNC: 29 MMOL/L (ref 21–32)
COLOR UR: ABNORMAL
CREAT SERPL-MCNC: 1.31 MG/DL (ref 0.7–1.3)
DIFFERENTIAL METHOD BLD: NORMAL
EOSINOPHIL # BLD: 0.1 K/UL (ref 0–0.4)
EOSINOPHIL NFR BLD: 1 % (ref 0–7)
EPITH CASTS URNS QL MICRO: ABNORMAL /LPF
ERYTHROCYTE [DISTWIDTH] IN BLOOD BY AUTOMATED COUNT: 13.3 % (ref 11.5–14.5)
GLOBULIN SER CALC-MCNC: 3.8 G/DL (ref 2–4)
GLUCOSE SERPL-MCNC: 104 MG/DL (ref 65–100)
GLUCOSE UR STRIP.AUTO-MCNC: NEGATIVE MG/DL
HCT VFR BLD AUTO: 42.9 % (ref 36.6–50.3)
HGB BLD-MCNC: 13.9 G/DL (ref 12.1–17)
HGB UR QL STRIP: NEGATIVE
HYALINE CASTS URNS QL MICRO: ABNORMAL /LPF (ref 0–5)
IMM GRANULOCYTES # BLD AUTO: 0 K/UL (ref 0–0.04)
IMM GRANULOCYTES NFR BLD AUTO: 0 % (ref 0–0.5)
KETONES UR QL STRIP.AUTO: NEGATIVE MG/DL
LEUKOCYTE ESTERASE UR QL STRIP.AUTO: NEGATIVE
LIPASE SERPL-CCNC: 286 U/L (ref 73–393)
LYMPHOCYTES # BLD: 2.9 K/UL (ref 0.8–3.5)
LYMPHOCYTES NFR BLD: 32 % (ref 12–49)
MCH RBC QN AUTO: 29 PG (ref 26–34)
MCHC RBC AUTO-ENTMCNC: 32.4 G/DL (ref 30–36.5)
MCV RBC AUTO: 89.4 FL (ref 80–99)
MONOCYTES # BLD: 0.9 K/UL (ref 0–1)
MONOCYTES NFR BLD: 10 % (ref 5–13)
NEUTS SEG # BLD: 5.2 K/UL (ref 1.8–8)
NEUTS SEG NFR BLD: 57 % (ref 32–75)
NITRITE UR QL STRIP.AUTO: NEGATIVE
NRBC # BLD: 0 K/UL (ref 0–0.01)
NRBC BLD-RTO: 0 PER 100 WBC
PH UR STRIP: 5.5 [PH] (ref 5–8)
PLATELET # BLD AUTO: 237 K/UL (ref 150–400)
PMV BLD AUTO: 9.3 FL (ref 8.9–12.9)
POTASSIUM SERPL-SCNC: 4.4 MMOL/L (ref 3.5–5.1)
PROT SERPL-MCNC: 7.8 G/DL (ref 6.4–8.2)
PROT UR STRIP-MCNC: 30 MG/DL
RBC # BLD AUTO: 4.8 M/UL (ref 4.1–5.7)
RBC #/AREA URNS HPF: ABNORMAL /HPF (ref 0–5)
SODIUM SERPL-SCNC: 138 MMOL/L (ref 136–145)
SP GR UR REFRACTOMETRY: 1.02 (ref 1–1.03)
UA: UC IF INDICATED,UAUC: ABNORMAL
UROBILINOGEN UR QL STRIP.AUTO: 0.2 EU/DL (ref 0.2–1)
WBC # BLD AUTO: 9.2 K/UL (ref 4.1–11.1)
WBC URNS QL MICRO: ABNORMAL /HPF (ref 0–4)

## 2021-04-20 PROCEDURE — 80053 COMPREHEN METABOLIC PANEL: CPT

## 2021-04-20 PROCEDURE — 81001 URINALYSIS AUTO W/SCOPE: CPT

## 2021-04-20 PROCEDURE — 85025 COMPLETE CBC W/AUTO DIFF WBC: CPT

## 2021-04-20 PROCEDURE — 99284 EMERGENCY DEPT VISIT MOD MDM: CPT

## 2021-04-20 PROCEDURE — 83690 ASSAY OF LIPASE: CPT

## 2021-04-20 PROCEDURE — 36415 COLL VENOUS BLD VENIPUNCTURE: CPT

## 2021-04-20 RX ORDER — DIAZEPAM 10 MG/2ML
2 INJECTION INTRAMUSCULAR
Status: COMPLETED | OUTPATIENT
Start: 2021-04-20 | End: 2021-04-21

## 2021-04-20 RX ORDER — KETOROLAC TROMETHAMINE 30 MG/ML
15 INJECTION, SOLUTION INTRAMUSCULAR; INTRAVENOUS
Status: COMPLETED | OUTPATIENT
Start: 2021-04-20 | End: 2021-04-21

## 2021-04-21 ENCOUNTER — APPOINTMENT (OUTPATIENT)
Dept: ULTRASOUND IMAGING | Age: 61
End: 2021-04-21
Attending: STUDENT IN AN ORGANIZED HEALTH CARE EDUCATION/TRAINING PROGRAM
Payer: MEDICARE

## 2021-04-21 ENCOUNTER — APPOINTMENT (OUTPATIENT)
Dept: GENERAL RADIOLOGY | Age: 61
End: 2021-04-21
Attending: STUDENT IN AN ORGANIZED HEALTH CARE EDUCATION/TRAINING PROGRAM
Payer: MEDICARE

## 2021-04-21 LAB
ATRIAL RATE: 61 BPM
CALCULATED P AXIS, ECG09: 64 DEGREES
CALCULATED R AXIS, ECG10: -45 DEGREES
CALCULATED T AXIS, ECG11: -3 DEGREES
DIAGNOSIS, 93000: NORMAL
P-R INTERVAL, ECG05: 192 MS
Q-T INTERVAL, ECG07: 388 MS
QRS DURATION, ECG06: 86 MS
QTC CALCULATION (BEZET), ECG08: 390 MS
TROPONIN I BLD-MCNC: <0.04 NG/ML (ref 0–0.08)
VENTRICULAR RATE, ECG03: 61 BPM

## 2021-04-21 PROCEDURE — 74011250636 HC RX REV CODE- 250/636: Performed by: STUDENT IN AN ORGANIZED HEALTH CARE EDUCATION/TRAINING PROGRAM

## 2021-04-21 PROCEDURE — 96375 TX/PRO/DX INJ NEW DRUG ADDON: CPT

## 2021-04-21 PROCEDURE — 93005 ELECTROCARDIOGRAM TRACING: CPT

## 2021-04-21 PROCEDURE — 71045 X-RAY EXAM CHEST 1 VIEW: CPT

## 2021-04-21 PROCEDURE — 84484 ASSAY OF TROPONIN QUANT: CPT

## 2021-04-21 PROCEDURE — 96374 THER/PROPH/DIAG INJ IV PUSH: CPT

## 2021-04-21 PROCEDURE — 76870 US EXAM SCROTUM: CPT

## 2021-04-21 RX ORDER — OXYCODONE HYDROCHLORIDE 5 MG/1
5 TABLET ORAL
Qty: 8 TAB | Refills: 0 | Status: SHIPPED | OUTPATIENT
Start: 2021-04-21 | End: 2021-04-24

## 2021-04-21 RX ORDER — PREDNISONE 20 MG/1
20 TABLET ORAL DAILY
Qty: 5 TAB | Refills: 0 | Status: SHIPPED | OUTPATIENT
Start: 2021-04-21 | End: 2021-04-26

## 2021-04-21 RX ORDER — METHOCARBAMOL 500 MG/1
500 TABLET, FILM COATED ORAL 3 TIMES DAILY
Qty: 30 TAB | Refills: 0 | Status: SHIPPED | OUTPATIENT
Start: 2021-04-21

## 2021-04-21 RX ORDER — LIDOCAINE 4 G/100G
1 PATCH TOPICAL EVERY 12 HOURS
Qty: 5 PATCH | Refills: 2 | Status: SHIPPED | OUTPATIENT
Start: 2021-04-21

## 2021-04-21 RX ORDER — MORPHINE SULFATE 2 MG/ML
4 INJECTION, SOLUTION INTRAMUSCULAR; INTRAVENOUS
Status: COMPLETED | OUTPATIENT
Start: 2021-04-21 | End: 2021-04-21

## 2021-04-21 RX ADMIN — DIAZEPAM 2 MG: 5 INJECTION, SOLUTION INTRAMUSCULAR; INTRAVENOUS at 00:33

## 2021-04-21 RX ADMIN — MORPHINE SULFATE 4 MG: 2 INJECTION, SOLUTION INTRAMUSCULAR; INTRAVENOUS at 01:41

## 2021-04-21 RX ADMIN — KETOROLAC TROMETHAMINE 15 MG: 30 INJECTION, SOLUTION INTRAMUSCULAR at 00:33

## 2021-04-21 NOTE — ED PROVIDER NOTES
EMERGENCY DEPARTMENT HISTORY AND PHYSICAL EXAM      Date: 4/20/2021  Patient Name: Lynette Richardson    History of Presenting Illness     Chief Complaint   Patient presents with    Groin Pain     pt reports pain in his L groin that radiates down into his sctorum x3 days, denies any urinary sx or concern for stds    Headache     x a few days    Flank Pain     R flank pain that goes up into his shoulder       History Provided By: Patient    HPI: Lynette Richardson, 61 y.o. male with relevant past medical history of right shoulder impingement syndrome, osteoarthritis of the right shoulder, cervicalgia, presents to the ED with cc of right shoulder pain that radiates down to the right upper chest and wraps around to his right back. States that this pain is sharp in nature, and has been present for several days. Worsened by movement and direct palpation. Patient was seen by Ortho in February for right shoulder pain, and was given muscle relaxers, Voltaren gel, as well as cortisone joint infection. States that there has been no new exacerbating activities or injuries to cause his symptoms today. Denies any weakness or numbness of distal aspects of his right upper extremity. Patient is right-hand dominant. Nursing triage note reports right flank pain, patient denies any flank pain during my evaluation. In addition to the above complaint, patient also complains of left groin pain for the past month. States that pain is constant, worsened by movement. Reports that for the past 3 days, pain has been radiating to left testicle. Describes pain as sharp and shooting in nature. Denies any associated dysuria or hematuria. No abnormal penile discharge or concern for STDs. No flank pain. No abdominal pain. No nausea, vomiting, constipation, diarrhea, fevers or chills. Patient denies noticing any masses or hernias with bearing down or Valsalva maneuvers.     PCP: Dominik Blakely, DO    No current facility-administered medications on file prior to encounter. Current Outpatient Medications on File Prior to Encounter   Medication Sig Dispense Refill    lidocaine (LIDODERM) 5 % Apply patch to the affected area for 12 hours a day and remove for 12 hours a day. 5 Each 0    atorvastatin (LIPITOR) 10 mg tablet Take 10 mg by mouth daily.  dulaglutide (TRULICITY) 1.5 NI/6.5 mL sub-q pen 1.5 mg by SubCUTAneous route every seven (7) days.  colchicine 0.6 mg tablet Take 0.6 mg by mouth daily.  NIFEdipine ER (ADALAT CC) 90 mg ER tablet Take 90 mg by mouth daily. 0    melatonin 5 mg cap capsule Take 5 mg by mouth nightly.  traZODone (DESYREL) 50 mg tablet Take 50 mg by mouth as needed for Sleep.  irbesartan (AVAPRO) 300 mg tablet Take 300 mg by mouth daily.  tamsulosin (FLOMAX) 0.4 mg capsule Take 0.4 mg by mouth daily.  aspirin 81 mg chewable tablet Take 81 mg by mouth daily.  metFORMIN (GLUCOPHAGE) 1,000 mg tablet Take 1,000 mg by mouth two (2) times daily (with meals). Past History     Past Medical History:  Past Medical History:   Diagnosis Date    Arthritis     Chronic pain     Diabetes ([address]     GERD (gastroesophageal reflux disease)     Gout     Hypertension     Other ill-defined conditions(799.89)     gout       Past Surgical History:  Past Surgical History:   Procedure Laterality Date    HX APPENDECTOMY      HX ORTHOPAEDIC      RIGHT knee scope       Family History:  Family History   Problem Relation Age of Onset    Hypertension Mother     Diabetes Mother     Heart Disease Father        Social History:  Social History     Tobacco Use    Smoking status: Never Smoker    Smokeless tobacco: Never Used   Substance Use Topics    Alcohol use: Yes     Comment: occassionally \"weekends\"    Drug use: No       Allergies:   Allergies   Allergen Reactions    Uloric [Febuxostat] Anaphylaxis    Allopurinol Hives         Review of Systems   Review of Systems   Constitutional: Negative for chills and fever. HENT: Negative for congestion and rhinorrhea. Eyes: Negative for visual disturbance. Respiratory: Negative for chest tightness and shortness of breath. Cardiovascular: Positive for chest pain. Gastrointestinal: Negative for abdominal pain, diarrhea, nausea and vomiting. Genitourinary: Positive for testicular pain. Negative for decreased urine volume, dysuria, flank pain, hematuria, penile pain, penile swelling, scrotal swelling and urgency. Left groin pain   Musculoskeletal: Positive for arthralgias and back pain. Negative for neck pain. Skin: Negative for rash. Allergic/Immunologic: Negative for immunocompromised state. Neurological: Negative for dizziness, speech difficulty, weakness and headaches. Hematological: Negative for adenopathy. Psychiatric/Behavioral: Negative for dysphoric mood and suicidal ideas. Physical Exam   Physical Exam  Vitals signs and nursing note reviewed. Constitutional:       General: He is not in acute distress. Appearance: Normal appearance. He is not ill-appearing or toxic-appearing. HENT:      Head: Normocephalic and atraumatic. Nose: Nose normal.      Mouth/Throat:      Mouth: Mucous membranes are moist.   Eyes:      Extraocular Movements: Extraocular movements intact. Pupils: Pupils are equal, round, and reactive to light. Neck:      Musculoskeletal: Normal range of motion and neck supple. Cardiovascular:      Rate and Rhythm: Normal rate and regular rhythm. Pulses: Normal pulses. Pulmonary:      Effort: Pulmonary effort is normal. No respiratory distress. Breath sounds: Normal breath sounds. No stridor. No wheezing or rhonchi. Abdominal:      General: Abdomen is flat. There is no distension. Palpations: There is no mass. Tenderness: There is no abdominal tenderness. Genitourinary:     Testes: Normal. Cremasteric reflex is present.          Right: Tenderness not present. Left: Tenderness not present. Comments: Patient without reproducible testicular tenderness. No testicular swelling. Musculoskeletal: Normal range of motion. Right shoulder: He exhibits tenderness. He exhibits normal range of motion, no bony tenderness, no swelling, no effusion, no crepitus, no deformity, no laceration, no pain, no spasm, normal pulse and normal strength. Arms:    Skin:     General: Skin is warm and dry. Neurological:      General: No focal deficit present. Mental Status: He is alert. Mental status is at baseline. Sensory: No sensory deficit. Motor: No weakness. Diagnostic Study Results     Labs -     Recent Results (from the past 24 hour(s))   CBC WITH AUTOMATED DIFF    Collection Time: 04/20/21  6:44 PM   Result Value Ref Range    WBC 9.2 4.1 - 11.1 K/uL    RBC 4.80 4. 10 - 5.70 M/uL    HGB 13.9 12.1 - 17.0 g/dL    HCT 42.9 36.6 - 50.3 %    MCV 89.4 80.0 - 99.0 FL    MCH 29.0 26.0 - 34.0 PG    MCHC 32.4 30.0 - 36.5 g/dL    RDW 13.3 11.5 - 14.5 %    PLATELET 194 167 - 941 K/uL    MPV 9.3 8.9 - 12.9 FL    NRBC 0.0 0  WBC    ABSOLUTE NRBC 0.00 0.00 - 0.01 K/uL    NEUTROPHILS 57 32 - 75 %    LYMPHOCYTES 32 12 - 49 %    MONOCYTES 10 5 - 13 %    EOSINOPHILS 1 0 - 7 %    BASOPHILS 0 0 - 1 %    IMMATURE GRANULOCYTES 0 0.0 - 0.5 %    ABS. NEUTROPHILS 5.2 1.8 - 8.0 K/UL    ABS. LYMPHOCYTES 2.9 0.8 - 3.5 K/UL    ABS. MONOCYTES 0.9 0.0 - 1.0 K/UL    ABS. EOSINOPHILS 0.1 0.0 - 0.4 K/UL    ABS. BASOPHILS 0.0 0.0 - 0.1 K/UL    ABS. IMM.  GRANS. 0.0 0.00 - 0.04 K/UL    DF AUTOMATED     METABOLIC PANEL, COMPREHENSIVE    Collection Time: 04/20/21  6:44 PM   Result Value Ref Range    Sodium 138 136 - 145 mmol/L    Potassium 4.4 3.5 - 5.1 mmol/L    Chloride 106 97 - 108 mmol/L    CO2 29 21 - 32 mmol/L    Anion gap 3 (L) 5 - 15 mmol/L    Glucose 104 (H) 65 - 100 mg/dL    BUN 14 6 - 20 MG/DL    Creatinine 1.31 (H) 0.70 - 1.30 MG/DL    BUN/Creatinine ratio 11 (L) 12 - 20      GFR est AA >60 >60 ml/min/1.73m2    GFR est non-AA 56 (L) >60 ml/min/1.73m2    Calcium 9.3 8.5 - 10.1 MG/DL    Bilirubin, total 0.4 0.2 - 1.0 MG/DL    ALT (SGPT) 47 12 - 78 U/L    AST (SGOT) 22 15 - 37 U/L    Alk. phosphatase 61 45 - 117 U/L    Protein, total 7.8 6.4 - 8.2 g/dL    Albumin 4.0 3.5 - 5.0 g/dL    Globulin 3.8 2.0 - 4.0 g/dL    A-G Ratio 1.1 1.1 - 2.2     LIPASE    Collection Time: 04/20/21  6:44 PM   Result Value Ref Range    Lipase 286 73 - 393 U/L   URINALYSIS W/ REFLEX CULTURE    Collection Time: 04/20/21  6:44 PM    Specimen: Urine   Result Value Ref Range    Color YELLOW/STRAW      Appearance CLEAR CLEAR      Specific gravity 1.021 1.003 - 1.030      pH (UA) 5.5 5.0 - 8.0      Protein 30 (A) NEG mg/dL    Glucose Negative NEG mg/dL    Ketone Negative NEG mg/dL    Bilirubin Negative NEG      Blood Negative NEG      Urobilinogen 0.2 0.2 - 1.0 EU/dL    Nitrites Negative NEG      Leukocyte Esterase Negative NEG      WBC 0-4 0 - 4 /hpf    RBC 0-5 0 - 5 /hpf    Epithelial cells FEW FEW /lpf    Bacteria Negative NEG /hpf    UA:UC IF INDICATED CULTURE NOT INDICATED BY UA RESULT CNI      Hyaline cast 0-2 0 - 5 /lpf   EKG, 12 LEAD, INITIAL    Collection Time: 04/21/21 12:31 AM   Result Value Ref Range    Ventricular Rate 61 BPM    Atrial Rate 61 BPM    P-R Interval 192 ms    QRS Duration 86 ms    Q-T Interval 388 ms    QTC Calculation (Bezet) 390 ms    Calculated P Axis 64 degrees    Calculated R Axis -45 degrees    Calculated T Axis -3 degrees    Diagnosis       Normal sinus rhythm  Left axis deviation  Nonspecific T wave abnormality  When compared with ECG of 30-APR-2020 21:48,  No significant change was found     POC TROPONIN-I    Collection Time: 04/21/21  1:52 AM   Result Value Ref Range    Troponin-I (POC) <0.04 0.00 - 0.08 ng/mL       Radiologic Studies -   XR CHEST PORT   Final Result   No acute process identified.              US SCROTUM/TESTICLES   Final Result      Normal scrotal ultrasound. CT Results  (Last 48 hours)    None        CXR Results  (Last 48 hours)               04/21/21 0114  XR CHEST PORT Final result    Impression:  No acute process identified. Narrative:  Clinical history: chest and back pain on R   INDICATION:   chest and back pain on R   COMPARISON: 5/1/2020       FINDINGS:   AP portable upright view of the chest demonstrates a stable  cardiopericardial   silhouette. There is no pleural effusion. .There is no focal consolidation. .There   is no pneumothorax. .              Medical Decision Making   I am the first provider for this patient. I reviewed the vital signs, available nursing notes, past medical history, past surgical history, family history and social history. Vital Signs-Reviewed the patient's vital signs. Patient Vitals for the past 24 hrs:   Temp Pulse Resp BP SpO2   04/20/21 2241 98.9 °F (37.2 °C) -- -- -- --   04/20/21 1803 -- 72 14 129/83 100 %       EKG interpretation: (Preliminary)  Normal sinus rhythm, 61 bpm, no acute ST changes concerning for ischemia. QTc within normal limits at 390. EKG interpretation by Maria G Gee MD    Records Reviewed: Nursing Notes, Old Medical Records and Previous Laboratory Studies    Provider Notes (Medical Decision Making):   Patient's right shoulder, right chest, and right back pain is likely all musculoskeletal in nature, likely related to his history of shoulder impingement. However, as patient is obese with cardiovascular risk factors, will obtain EKG, cardiac labs, chest x-ray. His left groin pain has been present for 1 month, no palpable masses or hernia on exam, even with patient bearing down. No signs or symptoms concerning for strangulation/obstruction. I do not feel he requires emergent CT abdomen pelvis imaging on this visit. On  exam, patient has no reproducible testicular or scrotal tenderness.  However, considering his symptoms have been recently tied to radiation into the left scrotum, will obtain UA, and scrotal ultrasound to rule out for intermittent torsion. Low concern for kidney stones as symptoms have been unchanged for the past month, patient has not had any flank pain, hematuria, nausea, vomiting, or fevers. He has no previous history of kidney stones. And groin pain is reproducible on exam, which is less likely to be the case in the setting of kidney stone pain. We will treat patient with pain medication and muscle relaxants while awaiting work-up results. His EKG is nonischemic, troponin is negative. Basic labs largely unremarkable and at his baseline. Chest x-ray is negative for acute process. UA is negative for infection. Scrotal ultrasound is negative for torsion or any other acute scrotal abnormalities. Results discussed with the patient. He is advised to follow-up with orthopedic surgery for further management of his shoulder impingement syndromes. He previously has had relief after cortisone injection into the joint, he would likely require the same again. As patient's blood sugar is well controlled on today's laboratory findings, will cautiously place patient on 5-day course of 20 mg of prednisone to help with pain. Discussed with patient importance of daily and frequent blood glucose monitoring, and to stop the steroids if his blood sugar is significantly elevated compared to his baseline. Patient verbalized understanding and agreement. We will also provide Rx for Robaxin, a few tabs of oxycodone, as well as lidocaine patch. He will be discharged at this time in stable and improved condition. All questions answered. Return precautions discussed. Roxie Essex, MD      Disposition:  Discharge      DISCHARGE PLAN:  1.    Discharge Medication List as of 4/21/2021  1:47 AM      START taking these medications    Details   oxyCODONE IR (Roxicodone) 5 mg immediate release tablet Take 1 Tab by mouth every eight (8) hours as needed for Pain for up to 3 days. Max Daily Amount: 15 mg., Normal, Disp-8 Tab, R-0      methocarbamoL (ROBAXIN) 500 mg tablet Take 1 Tab by mouth three (3) times daily. , Normal, Disp-30 Tab, R-0      predniSONE (DELTASONE) 20 mg tablet Take 20 mg by mouth daily for 5 days. With Breakfast, Normal, Disp-5 Tab, R-0      lidocaine 4 % patch 1 Patch by TransDERmal route every twelve (12) hours every twelve (12) hours. , Normal, Disp-5 Patch, R-2         CONTINUE these medications which have NOT CHANGED    Details   lidocaine (LIDODERM) 5 % Apply patch to the affected area for 12 hours a day and remove for 12 hours a day., Normal, Disp-5 Each, R-0      atorvastatin (LIPITOR) 10 mg tablet Take 10 mg by mouth daily. , Historical Med      dulaglutide (TRULICITY) 1.5 FU/7.2 mL sub-q pen 1.5 mg by SubCUTAneous route every seven (7) days. , Historical Med      colchicine 0.6 mg tablet Take 0.6 mg by mouth daily. , Historical Med      NIFEdipine ER (ADALAT CC) 90 mg ER tablet Take 90 mg by mouth daily. , Historical Med, R-0      melatonin 5 mg cap capsule Take 5 mg by mouth nightly., Historical Med      traZODone (DESYREL) 50 mg tablet Take 50 mg by mouth as needed for Sleep., Historical Med      irbesartan (AVAPRO) 300 mg tablet Take 300 mg by mouth daily. , Historical Med      tamsulosin (FLOMAX) 0.4 mg capsule Take 0.4 mg by mouth daily. , Historical Med      aspirin 81 mg chewable tablet Take 81 mg by mouth daily. , Historical Med      metFORMIN (GLUCOPHAGE) 1,000 mg tablet Take 1,000 mg by mouth two (2) times daily (with meals). , Historical Med           2. Follow-up Information     Follow up With Specialties Details Why Contact Info    Ranulfo Henson MD Sports Medicine Call in 1 day  [place] [address]  [address] [facility]  [phone]          3. Return to ED if worse     Diagnosis     Clinical Impression:   1. Shoulder impingement syndrome, right    2. Musculoskeletal chest pain    3.  Musculoskeletal back pain    4. Inguinal strain, left, initial encounter        Attestations:    Vilma Miller MD    Please note that this dictation was completed with [employer], the computer voice recognition software. Quite often unanticipated grammatical, syntax, homophones, and other interpretive errors are inadvertently transcribed by the computer software. Please disregard these errors. Please excuse any errors that have escaped final proofreading. Thank you.

## 2021-04-21 NOTE — ED NOTES
Pt was given written and verbal discharge instructions. I.V. removed and all questions answered. Pt ambulated out of ER without difficulty.

## 2021-07-20 ENCOUNTER — HOSPITAL ENCOUNTER (EMERGENCY)
Age: 61
Discharge: HOME | End: 2021-07-20
Attending: EMERGENCY MEDICINE | Admitting: EMERGENCY MEDICINE
Payer: MEDICARE

## 2021-07-20 VITALS
DIASTOLIC BLOOD PRESSURE: 92 MMHG | RESPIRATION RATE: 14 BRPM | SYSTOLIC BLOOD PRESSURE: 147 MMHG | TEMPERATURE: 98.1 F | HEART RATE: 72 BPM | OXYGEN SATURATION: 100 % | BODY MASS INDEX: 39.05 KG/M2 | HEIGHT: 66 IN | WEIGHT: 243 LBS

## 2021-07-20 DIAGNOSIS — G44.209 ACUTE NON INTRACTABLE TENSION-TYPE HEADACHE: ICD-10-CM

## 2021-07-20 DIAGNOSIS — R10.13 ABDOMINAL PAIN, EPIGASTRIC: Primary | ICD-10-CM

## 2021-07-20 DIAGNOSIS — R11.0 NAUSEA WITHOUT VOMITING: ICD-10-CM

## 2021-07-20 LAB
ALBUMIN SERPL-MCNC: 3.9 G/DL (ref 3.5–5)
ALBUMIN/GLOB SERPL: 1.1 {RATIO} (ref 1.1–2.2)
ALP SERPL-CCNC: 51 U/L (ref 45–117)
ALT SERPL-CCNC: 38 U/L (ref 12–78)
ANION GAP SERPL CALC-SCNC: 4 MMOL/L (ref 5–15)
AST SERPL-CCNC: 19 U/L (ref 15–37)
ATRIAL RATE: 67 BPM
BASOPHILS # BLD: 0 K/UL (ref 0–0.1)
BASOPHILS NFR BLD: 0 % (ref 0–1)
BILIRUB SERPL-MCNC: 0.4 MG/DL (ref 0.2–1)
BUN SERPL-MCNC: 10 MG/DL (ref 6–20)
BUN/CREAT SERPL: 8 (ref 12–20)
CALCIUM SERPL-MCNC: 9 MG/DL (ref 8.5–10.1)
CALCULATED P AXIS, ECG09: 58 DEGREES
CALCULATED R AXIS, ECG10: -45 DEGREES
CALCULATED T AXIS, ECG11: 2 DEGREES
CHLORIDE SERPL-SCNC: 109 MMOL/L (ref 97–108)
CO2 SERPL-SCNC: 28 MMOL/L (ref 21–32)
CREAT SERPL-MCNC: 1.19 MG/DL (ref 0.7–1.3)
DIAGNOSIS, 93000: NORMAL
DIFFERENTIAL METHOD BLD: NORMAL
EOSINOPHIL # BLD: 0.1 K/UL (ref 0–0.4)
EOSINOPHIL NFR BLD: 1 % (ref 0–7)
ERYTHROCYTE [DISTWIDTH] IN BLOOD BY AUTOMATED COUNT: 13.2 % (ref 11.5–14.5)
GLOBULIN SER CALC-MCNC: 3.5 G/DL (ref 2–4)
GLUCOSE SERPL-MCNC: 93 MG/DL (ref 65–100)
HCT VFR BLD AUTO: 41.1 % (ref 36.6–50.3)
HGB BLD-MCNC: 13.4 G/DL (ref 12.1–17)
IMM GRANULOCYTES # BLD AUTO: 0 K/UL (ref 0–0.04)
IMM GRANULOCYTES NFR BLD AUTO: 0 % (ref 0–0.5)
LIPASE SERPL-CCNC: 165 U/L (ref 73–393)
LYMPHOCYTES # BLD: 2.5 K/UL (ref 0.8–3.5)
LYMPHOCYTES NFR BLD: 36 % (ref 12–49)
MCH RBC QN AUTO: 29.6 PG (ref 26–34)
MCHC RBC AUTO-ENTMCNC: 32.6 G/DL (ref 30–36.5)
MCV RBC AUTO: 90.7 FL (ref 80–99)
MONOCYTES # BLD: 0.7 K/UL (ref 0–1)
MONOCYTES NFR BLD: 10 % (ref 5–13)
NEUTS SEG # BLD: 3.6 K/UL (ref 1.8–8)
NEUTS SEG NFR BLD: 53 % (ref 32–75)
NRBC # BLD: 0 K/UL (ref 0–0.01)
NRBC BLD-RTO: 0 PER 100 WBC
P-R INTERVAL, ECG05: 168 MS
PLATELET # BLD AUTO: 219 K/UL (ref 150–400)
PMV BLD AUTO: 9.4 FL (ref 8.9–12.9)
POTASSIUM SERPL-SCNC: 4.1 MMOL/L (ref 3.5–5.1)
PROT SERPL-MCNC: 7.4 G/DL (ref 6.4–8.2)
Q-T INTERVAL, ECG07: 382 MS
QRS DURATION, ECG06: 88 MS
QTC CALCULATION (BEZET), ECG08: 403 MS
RBC # BLD AUTO: 4.53 M/UL (ref 4.1–5.7)
SODIUM SERPL-SCNC: 141 MMOL/L (ref 136–145)
VENTRICULAR RATE, ECG03: 67 BPM
WBC # BLD AUTO: 6.9 K/UL (ref 4.1–11.1)

## 2021-07-20 PROCEDURE — 93005 ELECTROCARDIOGRAM TRACING: CPT

## 2021-07-20 PROCEDURE — 85025 COMPLETE CBC W/AUTO DIFF WBC: CPT

## 2021-07-20 PROCEDURE — 74011250637 HC RX REV CODE- 250/637: Performed by: EMERGENCY MEDICINE

## 2021-07-20 PROCEDURE — 99284 EMERGENCY DEPT VISIT MOD MDM: CPT

## 2021-07-20 PROCEDURE — 36415 COLL VENOUS BLD VENIPUNCTURE: CPT

## 2021-07-20 PROCEDURE — 74011000250 HC RX REV CODE- 250: Performed by: EMERGENCY MEDICINE

## 2021-07-20 PROCEDURE — 74011250636 HC RX REV CODE- 250/636: Performed by: EMERGENCY MEDICINE

## 2021-07-20 PROCEDURE — 96374 THER/PROPH/DIAG INJ IV PUSH: CPT

## 2021-07-20 PROCEDURE — 80053 COMPREHEN METABOLIC PANEL: CPT

## 2021-07-20 PROCEDURE — 96375 TX/PRO/DX INJ NEW DRUG ADDON: CPT

## 2021-07-20 PROCEDURE — 83690 ASSAY OF LIPASE: CPT

## 2021-07-20 RX ORDER — OMEPRAZOLE 20 MG/1
20 CAPSULE, DELAYED RELEASE ORAL DAILY
Qty: 30 CAPSULE | Refills: 0 | Status: SHIPPED | OUTPATIENT
Start: 2021-07-20

## 2021-07-20 RX ORDER — KETOROLAC TROMETHAMINE 30 MG/ML
15 INJECTION, SOLUTION INTRAMUSCULAR; INTRAVENOUS
Status: COMPLETED | OUTPATIENT
Start: 2021-07-20 | End: 2021-07-20

## 2021-07-20 RX ORDER — ONDANSETRON 2 MG/ML
4 INJECTION INTRAMUSCULAR; INTRAVENOUS
Status: COMPLETED | OUTPATIENT
Start: 2021-07-20 | End: 2021-07-20

## 2021-07-20 RX ORDER — ONDANSETRON 4 MG/1
4 TABLET, FILM COATED ORAL
Qty: 20 TABLET | Refills: 0 | Status: SHIPPED | OUTPATIENT
Start: 2021-07-20

## 2021-07-20 RX ORDER — MECLIZINE HCL 12.5 MG 12.5 MG/1
25 TABLET ORAL
Status: COMPLETED | OUTPATIENT
Start: 2021-07-20 | End: 2021-07-20

## 2021-07-20 RX ADMIN — MECLIZINE 25 MG: 12.5 TABLET ORAL at 15:12

## 2021-07-20 RX ADMIN — ONDANSETRON 4 MG: 2 INJECTION INTRAMUSCULAR; INTRAVENOUS at 16:02

## 2021-07-20 RX ADMIN — ALUMINUM HYDROXIDE AND MAGNESIUM HYDROXIDE 40 ML: 200; 200 SUSPENSION ORAL at 15:13

## 2021-07-20 RX ADMIN — KETOROLAC TROMETHAMINE 15 MG: 30 INJECTION, SOLUTION INTRAMUSCULAR; INTRAVENOUS at 15:17

## 2021-07-20 RX ADMIN — SODIUM CHLORIDE 500 ML: 9 INJECTION, SOLUTION INTRAVENOUS at 15:14

## 2021-07-20 NOTE — ED NOTES
Noelle Brown reviewed discharge instructions with the patient. The patient verbalized understanding. All questions and concerns were addressed. The patient declined a wheelchair and is discharged ambulatory in the care of family members with instructions and prescriptions in hand. Pt is alert and oriented x 4. Respirations are clear and unlabored.

## 2021-07-20 NOTE — ED PROVIDER NOTES
EMERGENCY DEPARTMENT HISTORY AND PHYSICAL EXAM      Date: 7/20/2021  Patient Name: Gordon Mittal  Patient Age and Sex: 61 y.o. male     History of Presenting Illness     Chief Complaint   Patient presents with    Headache     x3 days causing the patient to feel off balance.  Abdominal Pain     w. nausea x3 days       History Provided By: Patient    HPI: Gordon Mittal is a 80-year-old male presenting with abdominal pain, nausea and headache. Patient states that for the past 3 days has been having some upper abdominal pain associated with note nausea but denies any vomiting, diarrhea, constipation, dysuria or hematuria. Also in the last 3 days has been having a slight frontal headache and feeling like he is off balance. Denies any room spinning or lightheadedness sensation. Denies any history of vertigo in the past.  Denies any numbness or weakness of one side versus another. There are no other complaints, changes, or physical findings at this time. PCP: Jose Rapp, DO    No current facility-administered medications on file prior to encounter. Current Outpatient Medications on File Prior to Encounter   Medication Sig Dispense Refill    methocarbamoL (ROBAXIN) 500 mg tablet Take 1 Tab by mouth three (3) times daily. 30 Tab 0    lidocaine 4 % patch 1 Patch by TransDERmal route every twelve (12) hours every twelve (12) hours. 5 Patch 2    lidocaine (LIDODERM) 5 % Apply patch to the affected area for 12 hours a day and remove for 12 hours a day. 5 Each 0    atorvastatin (LIPITOR) 10 mg tablet Take 10 mg by mouth daily.  dulaglutide (TRULICITY) 1.5 GG/6.6 mL sub-q pen 1.5 mg by SubCUTAneous route every seven (7) days.  colchicine 0.6 mg tablet Take 0.6 mg by mouth daily.  NIFEdipine ER (ADALAT CC) 90 mg ER tablet Take 90 mg by mouth daily. 0    melatonin 5 mg cap capsule Take 5 mg by mouth nightly.       traZODone (DESYREL) 50 mg tablet Take 50 mg by mouth as needed for Sleep.      irbesartan (AVAPRO) 300 mg tablet Take 300 mg by mouth daily.  tamsulosin (FLOMAX) 0.4 mg capsule Take 0.4 mg by mouth daily.  aspirin 81 mg chewable tablet Take 81 mg by mouth daily.  metFORMIN (GLUCOPHAGE) 1,000 mg tablet Take 1,000 mg by mouth two (2) times daily (with meals). Past History     Past Medical History:  Past Medical History:   Diagnosis Date    Arthritis     Chronic pain     Diabetes ([address]     GERD (gastroesophageal reflux disease)     Gout     Hypertension     Other ill-defined conditions(799.89)     gout       Past Surgical History:  Past Surgical History:   Procedure Laterality Date    HX APPENDECTOMY      HX ORTHOPAEDIC      RIGHT knee scope       Family History:  Family History   Problem Relation Age of Onset    Hypertension Mother     Diabetes Mother     Heart Disease Father        Social History:  Social History     Tobacco Use    Smoking status: Never Smoker    Smokeless tobacco: Never Used   Substance Use Topics    Alcohol use: Yes     Comment: occassionally \"weekends\"    Drug use: No       Allergies: Allergies   Allergen Reactions    Uloric [Febuxostat] Anaphylaxis    Allopurinol Hives         Review of Systems   Review of Systems   Constitutional: Negative for chills and fever. Respiratory: Negative for cough and shortness of breath. Cardiovascular: Negative for chest pain. Gastrointestinal: Positive for abdominal pain and nausea. Negative for constipation, diarrhea and vomiting. Genitourinary: Negative for dysuria, frequency and hematuria. Neurological: Positive for dizziness and headaches. Negative for weakness and numbness. All other systems reviewed and are negative. Physical Exam   Physical Exam  Vitals and nursing note reviewed. Constitutional:       Appearance: He is well-developed. HENT:      Head: Normocephalic and atraumatic.       Nose: Nose normal.      Mouth/Throat:      Mouth: Mucous membranes are moist.      Comments: Slightly dry mucous membranes  Eyes:      Extraocular Movements: Extraocular movements intact. Conjunctiva/sclera: Conjunctivae normal.      Pupils: Pupils are equal, round, and reactive to light. Cardiovascular:      Rate and Rhythm: Normal rate and regular rhythm. Pulmonary:      Effort: Pulmonary effort is normal. No respiratory distress. Breath sounds: Normal breath sounds. Abdominal:      General: There is no distension. Palpations: Abdomen is soft. Tenderness: There is abdominal tenderness in the epigastric area. Musculoskeletal:         General: Normal range of motion. Cervical back: Normal range of motion and neck supple. Skin:     General: Skin is warm and dry. Neurological:      General: No focal deficit present. Mental Status: He is alert and oriented to person, place, and time. Mental status is at baseline. Cranial Nerves: No cranial nerve deficit.       Comments: CN 2-12 intact, 5/5 strength in all 4 extremities, Finger to nose intact, negative Romberg, normal gait   Psychiatric:         Mood and Affect: Mood normal.          Diagnostic Study Results     Labs -     Recent Results (from the past 12 hour(s))   EKG, 12 LEAD, INITIAL    Collection Time: 07/20/21  2:45 PM   Result Value Ref Range    Ventricular Rate 67 BPM    Atrial Rate 67 BPM    P-R Interval 168 ms    QRS Duration 88 ms    Q-T Interval 382 ms    QTC Calculation (Bezet) 403 ms    Calculated P Axis 58 degrees    Calculated R Axis -45 degrees    Calculated T Axis 2 degrees    Diagnosis       ** Poor data quality, interpretation may be adversely affected  Normal sinus rhythm  Left anterior fascicular block  When compared with ECG of 21-APR-2021 00:31,  No significant change was found     CBC WITH AUTOMATED DIFF    Collection Time: 07/20/21  3:07 PM   Result Value Ref Range    WBC 6.9 4.1 - 11.1 K/uL    RBC 4.53 4.10 - 5.70 M/uL    HGB 13.4 12.1 - 17.0 g/dL    HCT 41.1 36.6 - 50.3 %    MCV 90.7 80.0 - 99.0 FL    MCH 29.6 26.0 - 34.0 PG    MCHC 32.6 30.0 - 36.5 g/dL    RDW 13.2 11.5 - 14.5 %    PLATELET 730 909 - 972 K/uL    MPV 9.4 8.9 - 12.9 FL    NRBC 0.0 0  WBC    ABSOLUTE NRBC 0.00 0.00 - 0.01 K/uL    NEUTROPHILS 53 32 - 75 %    LYMPHOCYTES 36 12 - 49 %    MONOCYTES 10 5 - 13 %    EOSINOPHILS 1 0 - 7 %    BASOPHILS 0 0 - 1 %    IMMATURE GRANULOCYTES 0 0.0 - 0.5 %    ABS. NEUTROPHILS 3.6 1.8 - 8.0 K/UL    ABS. LYMPHOCYTES 2.5 0.8 - 3.5 K/UL    ABS. MONOCYTES 0.7 0.0 - 1.0 K/UL    ABS. EOSINOPHILS 0.1 0.0 - 0.4 K/UL    ABS. BASOPHILS 0.0 0.0 - 0.1 K/UL    ABS. IMM. GRANS. 0.0 0.00 - 0.04 K/UL    DF AUTOMATED     METABOLIC PANEL, COMPREHENSIVE    Collection Time: 07/20/21  3:07 PM   Result Value Ref Range    Sodium 141 136 - 145 mmol/L    Potassium 4.1 3.5 - 5.1 mmol/L    Chloride 109 (H) 97 - 108 mmol/L    CO2 28 21 - 32 mmol/L    Anion gap 4 (L) 5 - 15 mmol/L    Glucose 93 65 - 100 mg/dL    BUN 10 6 - 20 MG/DL    Creatinine 1.19 0.70 - 1.30 MG/DL    BUN/Creatinine ratio 8 (L) 12 - 20      GFR est AA >60 >60 ml/min/1.73m2    GFR est non-AA >60 >60 ml/min/1.73m2    Calcium 9.0 8.5 - 10.1 MG/DL    Bilirubin, total 0.4 0.2 - 1.0 MG/DL    ALT (SGPT) 38 12 - 78 U/L    AST (SGOT) 19 15 - 37 U/L    Alk. phosphatase 51 45 - 117 U/L    Protein, total 7.4 6.4 - 8.2 g/dL    Albumin 3.9 3.5 - 5.0 g/dL    Globulin 3.5 2.0 - 4.0 g/dL    A-G Ratio 1.1 1.1 - 2.2     LIPASE    Collection Time: 07/20/21  3:07 PM   Result Value Ref Range    Lipase 165 73 - 393 U/L       Radiologic Studies -   No orders to display     CT Results  (Last 48 hours)    None        CXR Results  (Last 48 hours)    None            Medical Decision Making   I am the first provider for this patient. I reviewed the vital signs, available nursing notes, past medical history, past surgical history, family history and social history. Vital Signs-Reviewed the patient's vital signs.   Patient Vitals for the past 12 hrs: Temp Pulse Resp BP SpO2   07/20/21 1440 98.1 °F (36.7 °C) 72 14 (!) 147/92 100 %       Records Reviewed: Nursing Notes and Old Medical Records    Provider Notes (Medical Decision Making):   Patient presenting with mild epigastric tenderness in addition to dizziness. Could be separate issues or related. Differential includes gastritis, pancreatitis, hepatitis, gallbladder pathology. Regarding the headache and the dizziness could be most likely peripheral vertigo given the way he is describing it, dehydration, tension headache. ED Course:   Initial assessment performed. The patients presenting problems have been discussed, and they are in agreement with the care plan formulated and outlined with them. I have encouraged them to ask questions as they arise throughout their visit. ED Course as of Jul 20 1807   Tue Jul 20, 2021   1532 EKG interpreted by me. Shows Normal Sinus rhythm with a HR of 67. No ST elevations or depressions concerning for ischemia. Normal intervals. Glenn Solares MD          [JS]      ED Course User Index  [JS] Estela Gutierrez MD     Critical Care Time:   0    Disposition:  Discharge Note:  The patient has been re-evaluated and is ready for discharge. Reviewed available results with patient. Counseled patient on diagnosis and care plan. Patient has expressed understanding, and all questions have been answered. Patient agrees with plan and agrees to follow up as recommended, or to return to the ED if their symptoms worsen. Discharge instructions have been provided and explained to the patient, along with reasons to return to the ED. PLAN:  Discharge Medication List as of 7/20/2021  3:57 PM      START taking these medications    Details   ondansetron hcl (Zofran) 4 mg tablet Take 1 Tablet by mouth every eight (8) hours as needed for Nausea., Normal, Disp-20 Tablet, R-0      omeprazole (PRILOSEC) 20 mg capsule Take 1 Capsule by mouth daily. , Normal, Disp-30 Capsule, R-0 CONTINUE these medications which have NOT CHANGED    Details   methocarbamoL (ROBAXIN) 500 mg tablet Take 1 Tab by mouth three (3) times daily. , Normal, Disp-30 Tab, R-0      lidocaine 4 % patch 1 Patch by TransDERmal route every twelve (12) hours every twelve (12) hours. , Normal, Disp-5 Patch, R-2      lidocaine (LIDODERM) 5 % Apply patch to the affected area for 12 hours a day and remove for 12 hours a day., Normal, Disp-5 Each, R-0      atorvastatin (LIPITOR) 10 mg tablet Take 10 mg by mouth daily. , Historical Med      dulaglutide (TRULICITY) 1.5 [place]/9.5 mL sub-q pen 1.5 mg by SubCUTAneous route every seven (7) days. , Historical Med      colchicine 0.6 mg tablet Take 0.6 mg by mouth daily. , Historical Med      NIFEdipine ER (ADALAT CC) 90 mg ER tablet Take 90 mg by mouth daily. , Historical Med, R-0      melatonin 5 mg cap capsule Take 5 mg by mouth nightly., Historical Med      traZODone (DESYREL) 50 mg tablet Take 50 mg by mouth as needed for Sleep., Historical Med      irbesartan (AVAPRO) 300 mg tablet Take 300 mg by mouth daily. , Historical Med      tamsulosin (FLOMAX) 0.4 mg capsule Take 0.4 mg by mouth daily. , Historical Med      aspirin 81 mg chewable tablet Take 81 mg by mouth daily. , Historical Med      metFORMIN (GLUCOPHAGE) 1,000 mg tablet Take 1,000 mg by mouth two (2) times daily (with meals). , Historical Med           2. Follow-up Information     Follow up With Specialties Details Why Contact Info    Estella Brown DO Family Medicine, Internal Medicine, Geriatric Medicine  As needed [id number] [facility] [address]           3. Return to ED if worse     Diagnosis     Clinical Impression:   1. Abdominal pain, epigastric    2. Nausea without vomiting    3. Acute non intractable tension-type headache        Attestations:    Terri Rowe M.D. Please note that this dictation was completed with [employer], the [employer] voice recognition software.   Quite often unanticipated grammatical, syntax, homophones, and other interpretive errors are inadvertently transcribed by the computer software. Please disregard these errors. Please excuse any errors that have escaped final proofreading. Thank you.

## 2021-07-20 NOTE — DISCHARGE INSTRUCTIONS
You were seen for upper abdominal pain. Your lab work came back negative for any signs of issues with your pancreas, liver, gallbladder. We all related to your stomach and gastritis. Please start taking the omeprazole. Take Zofran as needed for nausea. Make sure you are hydrating yourself well as he already have been. Follow-up with primary care doctor as needed.

## 2021-07-23 ENCOUNTER — APPOINTMENT (OUTPATIENT)
Dept: CT IMAGING | Age: 61
End: 2021-07-23
Attending: EMERGENCY MEDICINE
Payer: MEDICARE

## 2021-07-23 ENCOUNTER — HOSPITAL ENCOUNTER (EMERGENCY)
Age: 61
Discharge: HOME | End: 2021-07-23
Attending: EMERGENCY MEDICINE | Admitting: EMERGENCY MEDICINE
Payer: MEDICARE

## 2021-07-23 VITALS
WEIGHT: 233.69 LBS | TEMPERATURE: 98.5 F | HEIGHT: 66 IN | DIASTOLIC BLOOD PRESSURE: 83 MMHG | BODY MASS INDEX: 37.56 KG/M2 | RESPIRATION RATE: 20 BRPM | SYSTOLIC BLOOD PRESSURE: 139 MMHG | HEART RATE: 60 BPM | OXYGEN SATURATION: 99 %

## 2021-07-23 DIAGNOSIS — R10.13 ABDOMINAL PAIN, EPIGASTRIC: Primary | ICD-10-CM

## 2021-07-23 LAB
ALBUMIN SERPL-MCNC: 4 G/DL (ref 3.5–5)
ALBUMIN/GLOB SERPL: 1.1 {RATIO} (ref 1.1–2.2)
ALP SERPL-CCNC: 53 U/L (ref 45–117)
ALT SERPL-CCNC: 39 U/L (ref 12–78)
ANION GAP SERPL CALC-SCNC: 7 MMOL/L (ref 5–15)
APPEARANCE UR: CLEAR
AST SERPL-CCNC: 18 U/L (ref 15–37)
BASOPHILS # BLD: 0 K/UL (ref 0–0.1)
BASOPHILS NFR BLD: 0 % (ref 0–1)
BILIRUB SERPL-MCNC: 0.5 MG/DL (ref 0.2–1)
BILIRUB UR QL: NEGATIVE
BUN SERPL-MCNC: 12 MG/DL (ref 6–20)
BUN/CREAT SERPL: 9 (ref 12–20)
CALCIUM SERPL-MCNC: 9.1 MG/DL (ref 8.5–10.1)
CHLORIDE SERPL-SCNC: 106 MMOL/L (ref 97–108)
CO2 SERPL-SCNC: 28 MMOL/L (ref 21–32)
COLOR UR: NORMAL
CREAT SERPL-MCNC: 1.39 MG/DL (ref 0.7–1.3)
DIFFERENTIAL METHOD BLD: NORMAL
EOSINOPHIL # BLD: 0.1 K/UL (ref 0–0.4)
EOSINOPHIL NFR BLD: 1 % (ref 0–7)
ERYTHROCYTE [DISTWIDTH] IN BLOOD BY AUTOMATED COUNT: 13.1 % (ref 11.5–14.5)
GLOBULIN SER CALC-MCNC: 3.7 G/DL (ref 2–4)
GLUCOSE SERPL-MCNC: 80 MG/DL (ref 65–100)
GLUCOSE UR STRIP.AUTO-MCNC: NEGATIVE MG/DL
HCT VFR BLD AUTO: 43.2 % (ref 36.6–50.3)
HGB BLD-MCNC: 13.8 G/DL (ref 12.1–17)
HGB UR QL STRIP: NEGATIVE
IMM GRANULOCYTES # BLD AUTO: 0 K/UL (ref 0–0.04)
IMM GRANULOCYTES NFR BLD AUTO: 0 % (ref 0–0.5)
KETONES UR QL STRIP.AUTO: NEGATIVE MG/DL
LEUKOCYTE ESTERASE UR QL STRIP.AUTO: NEGATIVE
LIPASE SERPL-CCNC: 167 U/L (ref 73–393)
LYMPHOCYTES # BLD: 3.1 K/UL (ref 0.8–3.5)
LYMPHOCYTES NFR BLD: 40 % (ref 12–49)
MAGNESIUM SERPL-MCNC: 2.2 MG/DL (ref 1.6–2.4)
MCH RBC QN AUTO: 28.9 PG (ref 26–34)
MCHC RBC AUTO-ENTMCNC: 31.9 G/DL (ref 30–36.5)
MCV RBC AUTO: 90.4 FL (ref 80–99)
MONOCYTES # BLD: 0.9 K/UL (ref 0–1)
MONOCYTES NFR BLD: 11 % (ref 5–13)
NEUTS SEG # BLD: 3.6 K/UL (ref 1.8–8)
NEUTS SEG NFR BLD: 48 % (ref 32–75)
NITRITE UR QL STRIP.AUTO: NEGATIVE
NRBC # BLD: 0 K/UL (ref 0–0.01)
NRBC BLD-RTO: 0 PER 100 WBC
PH UR STRIP: 6 [PH] (ref 5–8)
PLATELET # BLD AUTO: 240 K/UL (ref 150–400)
PMV BLD AUTO: 9.3 FL (ref 8.9–12.9)
POTASSIUM SERPL-SCNC: 4.2 MMOL/L (ref 3.5–5.1)
PROT SERPL-MCNC: 7.7 G/DL (ref 6.4–8.2)
PROT UR STRIP-MCNC: NEGATIVE MG/DL
RBC # BLD AUTO: 4.78 M/UL (ref 4.1–5.7)
SODIUM SERPL-SCNC: 141 MMOL/L (ref 136–145)
SP GR UR REFRACTOMETRY: 1.01 (ref 1–1.03)
TROPONIN I SERPL-MCNC: <0.05 NG/ML
UROBILINOGEN UR QL STRIP.AUTO: 0.2 EU/DL (ref 0.2–1)
WBC # BLD AUTO: 7.7 K/UL (ref 4.1–11.1)

## 2021-07-23 PROCEDURE — 84484 ASSAY OF TROPONIN QUANT: CPT

## 2021-07-23 PROCEDURE — 96375 TX/PRO/DX INJ NEW DRUG ADDON: CPT

## 2021-07-23 PROCEDURE — 74177 CT ABD & PELVIS W/CONTRAST: CPT

## 2021-07-23 PROCEDURE — 83735 ASSAY OF MAGNESIUM: CPT

## 2021-07-23 PROCEDURE — 74011000636 HC RX REV CODE- 636: Performed by: EMERGENCY MEDICINE

## 2021-07-23 PROCEDURE — 36415 COLL VENOUS BLD VENIPUNCTURE: CPT

## 2021-07-23 PROCEDURE — 96374 THER/PROPH/DIAG INJ IV PUSH: CPT

## 2021-07-23 PROCEDURE — 83690 ASSAY OF LIPASE: CPT

## 2021-07-23 PROCEDURE — 99285 EMERGENCY DEPT VISIT HI MDM: CPT

## 2021-07-23 PROCEDURE — 74011250636 HC RX REV CODE- 250/636: Performed by: EMERGENCY MEDICINE

## 2021-07-23 PROCEDURE — 80053 COMPREHEN METABOLIC PANEL: CPT

## 2021-07-23 PROCEDURE — 74011250637 HC RX REV CODE- 250/637: Performed by: EMERGENCY MEDICINE

## 2021-07-23 PROCEDURE — 85025 COMPLETE CBC W/AUTO DIFF WBC: CPT

## 2021-07-23 PROCEDURE — 81003 URINALYSIS AUTO W/O SCOPE: CPT

## 2021-07-23 PROCEDURE — 93005 ELECTROCARDIOGRAM TRACING: CPT

## 2021-07-23 RX ORDER — ONDANSETRON 2 MG/ML
8 INJECTION INTRAMUSCULAR; INTRAVENOUS
Status: COMPLETED | OUTPATIENT
Start: 2021-07-23 | End: 2021-07-23

## 2021-07-23 RX ORDER — MORPHINE SULFATE 2 MG/ML
4 INJECTION, SOLUTION INTRAMUSCULAR; INTRAVENOUS
Status: COMPLETED | OUTPATIENT
Start: 2021-07-23 | End: 2021-07-23

## 2021-07-23 RX ORDER — OXYCODONE HYDROCHLORIDE 5 MG/1
5 TABLET ORAL
Qty: 12 TABLET | Refills: 0 | Status: SHIPPED | OUTPATIENT
Start: 2021-07-23 | End: 2021-07-26

## 2021-07-23 RX ORDER — OXYCODONE HYDROCHLORIDE 5 MG/1
5 TABLET ORAL
Status: COMPLETED | OUTPATIENT
Start: 2021-07-23 | End: 2021-07-23

## 2021-07-23 RX ADMIN — MORPHINE SULFATE 4 MG: 2 INJECTION, SOLUTION INTRAMUSCULAR; INTRAVENOUS at 19:34

## 2021-07-23 RX ADMIN — SODIUM CHLORIDE 1000 ML: 9 INJECTION, SOLUTION INTRAVENOUS at 20:20

## 2021-07-23 RX ADMIN — OXYCODONE HYDROCHLORIDE 5 MG: 5 TABLET ORAL at 22:11

## 2021-07-23 RX ADMIN — ONDANSETRON 8 MG: 2 INJECTION INTRAMUSCULAR; INTRAVENOUS at 19:33

## 2021-07-23 RX ADMIN — IOPAMIDOL 100 ML: 755 INJECTION, SOLUTION INTRAVENOUS at 19:20

## 2021-07-23 NOTE — ED NOTES
PIT note: The patient has been evaluated by me in triage, and is aware that they are waiting for a bed. Patient presents with recurrent AP; he notes it has not improved all week. He is currently stable to await a bed.   Jordan Eddy MD

## 2021-07-24 LAB
ATRIAL RATE: 71 BPM
CALCULATED P AXIS, ECG09: 59 DEGREES
CALCULATED R AXIS, ECG10: -53 DEGREES
CALCULATED T AXIS, ECG11: 5 DEGREES
DIAGNOSIS, 93000: NORMAL
P-R INTERVAL, ECG05: 180 MS
Q-T INTERVAL, ECG07: 368 MS
QRS DURATION, ECG06: 90 MS
QTC CALCULATION (BEZET), ECG08: 399 MS
VENTRICULAR RATE, ECG03: 71 BPM

## 2021-07-24 NOTE — ED PROVIDER NOTES
EMERGENCY DEPARTMENT HISTORY AND PHYSICAL EXAM      Date: 7/23/2021  Patient Name: Ehsan Espinosa    History of Presenting Illness     Chief Complaint   Patient presents with    Abdominal Pain     ED visit d/t abd pain, epigastric pain - Nausea / vomitting at home leading to ED visit - reports being seen in the ED recently yet worsening sxs leading to ED visitl       History Provided By: Patient    HPI: Ehsan Espinosa, 61 y.o. male presents to the ED with cc of abd pain. Pt c/o abd pain 9/10 to the upper abdomen. Pain is dull throbbing ache. There are no alleviating factors. Pain is worsened by eating and drinking. He has had nausea with a couple episodes of vomiting. He denies any fever or chills. There has been no CP or SOA. There has been no melena, hematochezia or BRBPR. Pt had recent colonoscopy at [facility]. He had been in the ED for similar complaint and was started on PPI. And Zofran. He has been taking the meds but has not had any improvement. He also thought he had been constipated and started Colace yesterday. There are no other complaints, changes, or physical findings at this time. PCP: Tono Stewart, DO    No current facility-administered medications on file prior to encounter. Current Outpatient Medications on File Prior to Encounter   Medication Sig Dispense Refill    ondansetron hcl (Zofran) 4 mg tablet Take 1 Tablet by mouth every eight (8) hours as needed for Nausea. 20 Tablet 0    omeprazole (PRILOSEC) 20 mg capsule Take 1 Capsule by mouth daily. 30 Capsule 0    methocarbamoL (ROBAXIN) 500 mg tablet Take 1 Tab by mouth three (3) times daily. 30 Tab 0    lidocaine 4 % patch 1 Patch by TransDERmal route every twelve (12) hours every twelve (12) hours. 5 Patch 2    lidocaine (LIDODERM) 5 % Apply patch to the affected area for 12 hours a day and remove for 12 hours a day. 5 Each 0    atorvastatin (LIPITOR) 10 mg tablet Take 10 mg by mouth daily.       dulaglutide (TRULICITY) 1.5 mg/0.5 mL sub-q pen 1.5 mg by SubCUTAneous route every seven (7) days.  colchicine 0.6 mg tablet Take 0.6 mg by mouth daily.  NIFEdipine ER (ADALAT CC) 90 mg ER tablet Take 90 mg by mouth daily. 0    melatonin 5 mg cap capsule Take 5 mg by mouth nightly.  traZODone (DESYREL) 50 mg tablet Take 50 mg by mouth as needed for Sleep.  irbesartan (AVAPRO) 300 mg tablet Take 300 mg by mouth daily.  tamsulosin (FLOMAX) 0.4 mg capsule Take 0.4 mg by mouth daily.  aspirin 81 mg chewable tablet Take 81 mg by mouth daily.  metFORMIN (GLUCOPHAGE) 1,000 mg tablet Take 1,000 mg by mouth two (2) times daily (with meals). Past History     Past Medical History:  Past Medical History:   Diagnosis Date    Arthritis     Chronic pain     Diabetes ([facility] [address]     GERD (gastroesophageal reflux disease)     Gout     Hypertension     Other ill-defined conditions(799.89)     gout       Past Surgical History:  Past Surgical History:   Procedure Laterality Date    HX APPENDECTOMY      HX ORTHOPAEDIC      RIGHT knee scope       Family History:  Family History   Problem Relation Age of Onset    Hypertension Mother     Diabetes Mother     Heart Disease Father        Social History:  Social History     Tobacco Use    Smoking status: Never Smoker    Smokeless tobacco: Never Used   Substance Use Topics    Alcohol use: Yes     Comment: occassionally \"weekends\"    Drug use: No       Allergies: Allergies   Allergen Reactions    Uloric [Febuxostat] Anaphylaxis    Allopurinol Hives         Review of Systems   Review of Systems   Constitutional: Negative. Negative for appetite change, chills, fatigue and fever. HENT: Negative. Negative for congestion, rhinorrhea, sinus pressure and sore throat. Eyes: Negative. Respiratory: Negative. Negative for cough, choking, chest tightness, shortness of breath and wheezing. Cardiovascular: Negative.   Negative for chest pain, palpitations and leg swelling. Gastrointestinal: Positive for abdominal pain, nausea and vomiting. Negative for blood in stool, constipation and diarrhea. Endocrine: Negative. Genitourinary: Negative. Negative for difficulty urinating, dysuria, flank pain and urgency. Musculoskeletal: Negative. Skin: Negative. Neurological: Negative. Negative for dizziness, speech difficulty, weakness, light-headedness, numbness and headaches. Psychiatric/Behavioral: Negative. All other systems reviewed and are negative. Physical Exam   Physical Exam  Vitals and nursing note reviewed. Constitutional:       General: He is not in acute distress. Appearance: He is well-developed. He is obese. He is not diaphoretic. HENT:      Head: Normocephalic and atraumatic. Mouth/Throat:      Mouth: Mucous membranes are dry. Pharynx: No oropharyngeal exudate. Eyes:      Conjunctiva/sclera: Conjunctivae normal.      Pupils: Pupils are equal, round, and reactive to light. Neck:      Vascular: No JVD. Trachea: No tracheal deviation. Cardiovascular:      Rate and Rhythm: Normal rate and regular rhythm. Heart sounds: Normal heart sounds. No murmur heard. Pulmonary:      Effort: Pulmonary effort is normal. No respiratory distress. Breath sounds: Normal breath sounds. No stridor. No wheezing or rales. Abdominal:      General: There is no distension. Palpations: Abdomen is soft. Tenderness: There is abdominal tenderness in the right upper quadrant, epigastric area and left upper quadrant. There is no guarding or rebound. Musculoskeletal:         General: No tenderness. Normal range of motion. Cervical back: Normal range of motion and neck supple. Right lower leg: No edema. Left lower leg: No edema. Skin:     General: Skin is warm and dry. Capillary Refill: Capillary refill takes less than 2 seconds.    Neurological:      Mental Status: He is alert and oriented to person, place, and time. Cranial Nerves: No cranial nerve deficit. Comments: No gross motor or sensory deficits    Psychiatric:         Behavior: Behavior normal.         Diagnostic Study Results     Labs -     Recent Results (from the past 12 hour(s))   EKG, 12 LEAD, INITIAL    Collection Time: 07/23/21  4:40 PM   Result Value Ref Range    Ventricular Rate 71 BPM    Atrial Rate 71 BPM    P-R Interval 180 ms    QRS Duration 90 ms    Q-T Interval 368 ms    QTC Calculation (Bezet) 399 ms    Calculated P Axis 59 degrees    Calculated R Axis -53 degrees    Calculated T Axis 5 degrees    Diagnosis       Normal sinus rhythm  Left anterior fascicular block  Cannot rule out Inferior infarct (masked by fascicular block?) , age   undetermined  When compared with ECG of 20-JUL-2021 14:45,  No significant change was found     CBC WITH AUTOMATED DIFF    Collection Time: 07/23/21  5:00 PM   Result Value Ref Range    WBC 7.7 4.1 - 11.1 K/uL    RBC 4.78 4.10 - 5.70 M/uL    HGB 13.8 12.1 - 17.0 g/dL    HCT 43.2 36.6 - 50.3 %    MCV 90.4 80.0 - 99.0 FL    MCH 28.9 26.0 - 34.0 PG    MCHC 31.9 30.0 - 36.5 g/dL    RDW 13.1 11.5 - 14.5 %    PLATELET 530 319 - 834 K/uL    MPV 9.3 8.9 - 12.9 FL    NRBC 0.0 0  WBC    ABSOLUTE NRBC 0.00 0.00 - 0.01 K/uL    NEUTROPHILS 48 32 - 75 %    LYMPHOCYTES 40 12 - 49 %    MONOCYTES 11 5 - 13 %    EOSINOPHILS 1 0 - 7 %    BASOPHILS 0 0 - 1 %    IMMATURE GRANULOCYTES 0 0.0 - 0.5 %    ABS. NEUTROPHILS 3.6 1.8 - 8.0 K/UL    ABS. LYMPHOCYTES 3.1 0.8 - 3.5 K/UL    ABS. MONOCYTES 0.9 0.0 - 1.0 K/UL    ABS. EOSINOPHILS 0.1 0.0 - 0.4 K/UL    ABS. BASOPHILS 0.0 0.0 - 0.1 K/UL    ABS. IMM.  GRANS. 0.0 0.00 - 0.04 K/UL    DF AUTOMATED     METABOLIC PANEL, COMPREHENSIVE    Collection Time: 07/23/21  5:00 PM   Result Value Ref Range    Sodium 141 136 - 145 mmol/L    Potassium 4.2 3.5 - 5.1 mmol/L    Chloride 106 97 - 108 mmol/L    CO2 28 21 - 32 mmol/L    Anion gap 7 5 - 15 mmol/L    Glucose 80 65 - 100 mg/dL    BUN 12 6 - 20 MG/DL    Creatinine 1.39 (H) 0.70 - 1.30 MG/DL    BUN/Creatinine ratio 9 (L) 12 - 20      GFR est AA >60 >60 ml/min/1.73m2    GFR est non-AA 52 (L) >60 ml/min/1.73m2    Calcium 9.1 8.5 - 10.1 MG/DL    Bilirubin, total 0.5 0.2 - 1.0 MG/DL    ALT (SGPT) 39 12 - 78 U/L    AST (SGOT) 18 15 - 37 U/L    Alk. phosphatase 53 45 - 117 U/L    Protein, total 7.7 6.4 - 8.2 g/dL    Albumin 4.0 3.5 - 5.0 g/dL    Globulin 3.7 2.0 - 4.0 g/dL    A-G Ratio 1.1 1.1 - 2.2     LIPASE    Collection Time: 07/23/21  5:00 PM   Result Value Ref Range    Lipase 167 73 - 393 U/L   MAGNESIUM    Collection Time: 07/23/21  5:00 PM   Result Value Ref Range    Magnesium 2.2 1.6 - 2.4 mg/dL   TROPONIN I    Collection Time: 07/23/21  5:00 PM   Result Value Ref Range    Troponin-I, Qt. <0.05 <0.05 ng/mL   URINALYSIS W/ RFLX MICROSCOPIC    Collection Time: 07/23/21  7:35 PM   Result Value Ref Range    Color YELLOW/STRAW      Appearance CLEAR CLEAR      Specific gravity 1.014 1.003 - 1.030      pH (UA) 6.0 5.0 - 8.0      Protein Negative NEG mg/dL    Glucose Negative NEG mg/dL    Ketone Negative NEG mg/dL    Bilirubin Negative NEG      Blood Negative NEG      Urobilinogen 0.2 0.2 - 1.0 EU/dL    Nitrites Negative NEG      Leukocyte Esterase Negative NEG         Radiologic Studies -   CT ABD PELV W CONT   Final Result      1. Markedly enlarged prostate with bladder wall thickening. CT Results  (Last 48 hours)               07/23/21 1920  CT ABD PELV W CONT Final result    Impression:      1. Markedly enlarged prostate with bladder wall thickening. Narrative:  EXAM: CT ABD PELV W CONT       INDICATION: diffuse AP; not improving all week       COMPARISON: December 2020. CONTRAST: 100 mL of Isovue-370. TECHNIQUE:    Following the uneventful intravenous administration of contrast, thin axial   images were obtained through the abdomen and pelvis. Coronal and sagittal   reconstructions were generated.  Oral contrast was not administered. CT dose   reduction was achieved through use of a standardized protocol tailored for this   examination and automatic exposure control for dose modulation. FINDINGS:    LOWER THORAX: No significant abnormality in the incidentally imaged lower chest.   LIVER: No mass. BILIARY TREE: Gallbladder is within normal limits. CBD is not dilated. SPLEEN: within normal limits. PANCREAS: No mass or ductal dilatation. ADRENALS: Unremarkable. KIDNEYS: No mass, calculus, or hydronephrosis. STOMACH: Unremarkable. SMALL BOWEL: No dilatation or wall thickening. COLON: No dilatation or wall thickening. APPENDIX:   PERITONEUM: No ascites or pneumoperitoneum. RETROPERITONEUM: No lymphadenopathy or aortic aneurysm. REPRODUCTIVE ORGANS:   URINARY BLADDER: Markedly enlarged prostate with bladder wall thickening likely   chronic outlet obstruction. BONES: No destructive bone lesion. ABDOMINAL WALL: No mass or hernia. ADDITIONAL COMMENTS: N/A               CXR Results  (Last 48 hours)    None          Medical Decision Making   I am the first provider for this patient. I reviewed the vital signs, available nursing notes, past medical history, past surgical history, family history and social history. Vital Signs-Reviewed the patient's vital signs.   Patient Vitals for the past 12 hrs:   Temp Pulse Resp BP SpO2   07/23/21 2200  60  139/83 99 %   07/23/21 2130    124/83 98 %   07/23/21 2100    132/81 96 %   07/23/21 2030    139/82 97 %   07/23/21 2000    130/87 99 %   07/23/21 1830    127/86 98 %   07/23/21 1800    121/80 97 %   07/23/21 1636 98.5 °F (36.9 °C) 73 20 135/81 99 %       EKG interpretation: (Preliminary)  NSR, rate 71, normal axis/pr/qrs, no acute ST changes, Rip Mussel, DO      Records Reviewed: Nursing Notes, Old Medical Records, Previous Radiology Studies and Previous Laboratory Studies , recent colonoscopy at [facility], int hemorrhoids no other sig finding     Provider Notes (Medical Decision Making):   DDx- Gastritis, pancreatitis, PUD, ACS, gall bladder disease, constipation     ED Course:   Initial assessment performed. The patients presenting problems have been discussed, and they are in agreement with the care plan formulated and outlined with them. I have encouraged them to ask questions as they arise throughout their visit. CT ordered- no acute abnormality. Pt has only been on PPI for a couple of days. I discussed with pt this medication may not have had a chance to make any sig changes. He could have PUD or gastritis. There has been no melena. Pain controlled here in ED. He states previous GI cocktail no change in pain. There is mod amt of stool but not likely causing pain that he is having. Discussed Miralax. He may need GI f/u for possible EGD if symptoms are not improving also discussed possible HIDA scan if his pain becomes more prominent in RUQ. Disposition:  DC home     DISCHARGE PLAN:  1. Discharge Medication List as of 7/23/2021  9:59 PM      START taking these medications    Details   oxyCODONE IR (Roxicodone) 5 mg immediate release tablet Take 1 Tablet by mouth every six (6) hours as needed for Pain for up to 3 days. Max Daily Amount: 20 mg., Normal, Disp-12 Tablet, R-0         CONTINUE these medications which have NOT CHANGED    Details   ondansetron hcl (Zofran) 4 mg tablet Take 1 Tablet by mouth every eight (8) hours as needed for Nausea., Normal, Disp-20 Tablet, R-0      omeprazole (PRILOSEC) 20 mg capsule Take 1 Capsule by mouth daily. , Normal, Disp-30 Capsule, R-0      methocarbamoL (ROBAXIN) 500 mg tablet Take 1 Tab by mouth three (3) times daily. , Normal, Disp-30 Tab, R-0      lidocaine 4 % patch 1 Patch by TransDERmal route every twelve (12) hours every twelve (12) hours. , Normal, Disp-5 Patch, R-2      lidocaine (LIDODERM) 5 % Apply patch to the affected area for 12 hours a day and remove for 12 hours a day. , Normal, Disp-5 Each, R-0      atorvastatin (LIPITOR) 10 mg tablet Take 10 mg by mouth daily. , Historical Med      dulaglutide (TRULICITY) 1.5 [place]/0.5 mL sub-q pen 1.5 mg by SubCUTAneous route every seven (7) days. , Historical Med      colchicine 0.6 mg tablet Take 0.6 mg by mouth daily. , Historical Med      NIFEdipine ER (ADALAT CC) 90 mg ER tablet Take 90 mg by mouth daily. , Historical Med, R-0      melatonin 5 mg cap capsule Take 5 mg by mouth nightly., Historical Med      traZODone (DESYREL) 50 mg tablet Take 50 mg by mouth as needed for Sleep., Historical Med      irbesartan (AVAPRO) 300 mg tablet Take 300 mg by mouth daily. , Historical Med      tamsulosin (FLOMAX) 0.4 mg capsule Take 0.4 mg by mouth daily. , Historical Med      aspirin 81 mg chewable tablet Take 81 mg by mouth daily. , Historical Med      metFORMIN (GLUCOPHAGE) 1,000 mg tablet Take 1,000 mg by mouth two (2) times daily (with meals). , Historical Med           2. Follow-up Information     Follow up With Specialties Details Why [address] [facility],[address], [facility],  Family Medicine, Internal Medicine, Geriatric Medicine   [address] [place] [address]  [phone]          3. Return to ED if worse     Diagnosis     Clinical Impression:   1. Abdominal pain, epigastric        Attestations:    Deejay Nurse, DO    Please note that this dictation was completed with [employer], the computer voice recognition software. Quite often unanticipated grammatical, syntax, homophones, and other interpretive errors are inadvertently transcribed by the computer software. Please disregard these errors. Please excuse any errors that have escaped final proofreading. Thank you.

## 2021-07-24 NOTE — ED NOTES
Patient given printed discharge instructions reviewed by the MD. Patient understands instructions/follow up recommendations. Patient ambulated out of ED on own with friend/family at his side.

## 2021-08-26 ENCOUNTER — OFFICE VISIT (OUTPATIENT)
Dept: URGENT CARE | Age: 61
End: 2021-08-26
Payer: MEDICARE

## 2021-08-26 VITALS — TEMPERATURE: 98.7 F | OXYGEN SATURATION: 95 % | RESPIRATION RATE: 18 BRPM | HEART RATE: 89 BPM

## 2021-08-26 DIAGNOSIS — U07.1 COVID-19: ICD-10-CM

## 2021-08-26 DIAGNOSIS — Z20.822 EXPOSURE TO COVID-19 VIRUS: Primary | ICD-10-CM

## 2021-08-26 LAB — SARS-COV-2 POC: NEGATIVE

## 2021-08-26 PROCEDURE — G8432 DEP SCR NOT DOC, RNG: HCPCS | Performed by: FAMILY MEDICINE

## 2021-08-26 PROCEDURE — 3017F COLORECTAL CA SCREEN DOC REV: CPT | Performed by: FAMILY MEDICINE

## 2021-08-26 PROCEDURE — 87426 SARSCOV CORONAVIRUS AG IA: CPT | Performed by: FAMILY MEDICINE

## 2021-08-26 PROCEDURE — G8417 CALC BMI ABV UP PARAM F/U: HCPCS | Performed by: FAMILY MEDICINE

## 2021-08-26 PROCEDURE — G8427 DOCREV CUR MEDS BY ELIG CLIN: HCPCS | Performed by: FAMILY MEDICINE

## 2021-08-26 PROCEDURE — 99202 OFFICE O/P NEW SF 15 MIN: CPT | Performed by: FAMILY MEDICINE

## 2021-08-26 NOTE — PROGRESS NOTES
Patient is asymptomatic. He has been in quarantine for 14 days and is requesting a covid test.     Would like repeat test after quarantine           Past Medical History:   Diagnosis Date    Arthritis     Chronic pain     Diabetes ([address]     GERD (gastroesophageal reflux disease)     Gout     Hypertension     Other ill-defined conditions(799.89)     gout        Past Surgical History:   Procedure Laterality Date    HX APPENDECTOMY      HX ORTHOPAEDIC      RIGHT knee scope         Family History   Problem Relation Age of Onset    Hypertension Mother     Diabetes Mother     Heart Disease Father         Social History     Socioeconomic History    Marital status: LEGALLY      Spouse name: Not on file    Number of children: Not on file    Years of education: Not on file    Highest education level: Not on file   Occupational History    Not on file   Tobacco Use    Smoking status: Never Smoker    Smokeless tobacco: Never Used   Substance and Sexual Activity    Alcohol use: Yes     Comment: occassionally \"weekends\"    Drug use: No    Sexual activity: Not Currently   Other Topics Concern    Not on file   Social History Narrative    Not on file     Social Determinants of Health     Financial Resource Strain:     Difficulty of Paying Living Expenses:    Food Insecurity:     Worried About Running Out of Food in the Last Year:     Ran Out of Food in the Last Year:    Transportation Needs:     Lack of Transportation (Medical):      Lack of Transportation (Non-Medical):    Physical Activity:     Days of Exercise per Week:     Minutes of Exercise per Session:    Stress:     Feeling of Stress :    Social Connections:     Frequency of Communication with Friends and Family:     Frequency of Social Gatherings with Friends and Family:     Attends Gnosticism Services:     Active Member of Clubs or Organizations:     Attends Club or Organization Meetings:     Marital Status:    Intimate Partner Violence:     Fear of Current or Ex-Partner:     Emotionally Abused:     Physically Abused:     Sexually Abused: ALLERGIES: Uloric [febuxostat], Allopurinol, and Hydralazine    Review of Systems   Constitutional: Negative for fever. HENT: Negative for sore throat. Respiratory: Negative for shortness of breath. Vitals:    08/26/21 1401   Pulse: 89   Resp: 18   Temp: 98.7 °F (37.1 °C)   SpO2: 95%       Physical Exam  Vitals reviewed. Neurological:      Mental Status: He is alert.          MDM     Differential Diagnosis; Clinical Impression; Plan:     Test after covid positive  Amount and/or Complexity of Data Reviewed:   Clinical lab tests:  Ordered      Procedures

## 2021-08-30 ENCOUNTER — APPOINTMENT (OUTPATIENT)
Dept: GENERAL RADIOLOGY | Age: 61
End: 2021-08-30
Attending: NURSE PRACTITIONER
Payer: COMMERCIAL

## 2021-08-30 ENCOUNTER — HOSPITAL ENCOUNTER (EMERGENCY)
Age: 61
Discharge: HOME | End: 2021-08-30
Attending: EMERGENCY MEDICINE
Payer: COMMERCIAL

## 2021-08-30 VITALS
DIASTOLIC BLOOD PRESSURE: 78 MMHG | HEART RATE: 81 BPM | HEIGHT: 66 IN | OXYGEN SATURATION: 98 % | BODY MASS INDEX: 37.28 KG/M2 | WEIGHT: 232 LBS | SYSTOLIC BLOOD PRESSURE: 122 MMHG | RESPIRATION RATE: 18 BRPM | TEMPERATURE: 97.8 F

## 2021-08-30 DIAGNOSIS — K21.9 GASTROESOPHAGEAL REFLUX DISEASE WITHOUT ESOPHAGITIS: Primary | ICD-10-CM

## 2021-08-30 DIAGNOSIS — M19.031 OSTEOARTHRITIS OF RIGHT WRIST, UNSPECIFIED OSTEOARTHRITIS TYPE: ICD-10-CM

## 2021-08-30 LAB
ALBUMIN SERPL-MCNC: 3.9 G/DL (ref 3.5–5)
ALBUMIN/GLOB SERPL: 1 {RATIO} (ref 1.1–2.2)
ALP SERPL-CCNC: 62 U/L (ref 45–117)
ALT SERPL-CCNC: 54 U/L (ref 12–78)
ANION GAP SERPL CALC-SCNC: 11 MMOL/L (ref 5–15)
AST SERPL-CCNC: 21 U/L (ref 15–37)
BASOPHILS # BLD: 0 K/UL (ref 0–0.1)
BASOPHILS NFR BLD: 0 % (ref 0–1)
BILIRUB SERPL-MCNC: 0.5 MG/DL (ref 0.2–1)
BUN SERPL-MCNC: 16 MG/DL (ref 6–20)
BUN/CREAT SERPL: 11 (ref 12–20)
CALCIUM SERPL-MCNC: 9.3 MG/DL (ref 8.5–10.1)
CHLORIDE SERPL-SCNC: 103 MMOL/L (ref 97–108)
CO2 SERPL-SCNC: 27 MMOL/L (ref 21–32)
CREAT SERPL-MCNC: 1.51 MG/DL (ref 0.7–1.3)
CRP SERPL-MCNC: 0.39 MG/DL (ref 0–0.6)
DIFFERENTIAL METHOD BLD: ABNORMAL
EOSINOPHIL # BLD: 0.1 K/UL (ref 0–0.4)
EOSINOPHIL NFR BLD: 1 % (ref 0–7)
ERYTHROCYTE [DISTWIDTH] IN BLOOD BY AUTOMATED COUNT: 12.6 % (ref 11.5–14.5)
GLOBULIN SER CALC-MCNC: 4.1 G/DL (ref 2–4)
GLUCOSE SERPL-MCNC: 95 MG/DL (ref 65–100)
HCT VFR BLD AUTO: 43 % (ref 36.6–50.3)
HGB BLD-MCNC: 14.1 G/DL (ref 12.1–17)
IMM GRANULOCYTES # BLD AUTO: 0.1 K/UL (ref 0–0.04)
IMM GRANULOCYTES NFR BLD AUTO: 1 % (ref 0–0.5)
LIPASE SERPL-CCNC: 203 U/L (ref 73–393)
LYMPHOCYTES # BLD: 2.5 K/UL (ref 0.8–3.5)
LYMPHOCYTES NFR BLD: 24 % (ref 12–49)
MCH RBC QN AUTO: 28.9 PG (ref 26–34)
MCHC RBC AUTO-ENTMCNC: 32.8 G/DL (ref 30–36.5)
MCV RBC AUTO: 88.1 FL (ref 80–99)
MONOCYTES # BLD: 1.5 K/UL (ref 0–1)
MONOCYTES NFR BLD: 15 % (ref 5–13)
NEUTS SEG # BLD: 6.3 K/UL (ref 1.8–8)
NEUTS SEG NFR BLD: 59 % (ref 32–75)
NRBC # BLD: 0 K/UL (ref 0–0.01)
NRBC BLD-RTO: 0 PER 100 WBC
PLATELET # BLD AUTO: 395 K/UL (ref 150–400)
PMV BLD AUTO: 8.6 FL (ref 8.9–12.9)
POTASSIUM SERPL-SCNC: 4.2 MMOL/L (ref 3.5–5.1)
PROT SERPL-MCNC: 8 G/DL (ref 6.4–8.2)
RBC # BLD AUTO: 4.88 M/UL (ref 4.1–5.7)
SODIUM SERPL-SCNC: 141 MMOL/L (ref 136–145)
URATE SERPL-MCNC: 4.9 MG/DL (ref 3.5–7.2)
WBC # BLD AUTO: 10.4 K/UL (ref 4.1–11.1)

## 2021-08-30 PROCEDURE — 74011250637 HC RX REV CODE- 250/637: Performed by: NURSE PRACTITIONER

## 2021-08-30 PROCEDURE — 83690 ASSAY OF LIPASE: CPT

## 2021-08-30 PROCEDURE — C9113 INJ PANTOPRAZOLE SODIUM, VIA: HCPCS | Performed by: NURSE PRACTITIONER

## 2021-08-30 PROCEDURE — 99283 EMERGENCY DEPT VISIT LOW MDM: CPT

## 2021-08-30 PROCEDURE — 73110 X-RAY EXAM OF WRIST: CPT

## 2021-08-30 PROCEDURE — 96375 TX/PRO/DX INJ NEW DRUG ADDON: CPT

## 2021-08-30 PROCEDURE — 80053 COMPREHEN METABOLIC PANEL: CPT

## 2021-08-30 PROCEDURE — 74011250636 HC RX REV CODE- 250/636: Performed by: NURSE PRACTITIONER

## 2021-08-30 PROCEDURE — 96374 THER/PROPH/DIAG INJ IV PUSH: CPT

## 2021-08-30 PROCEDURE — 36415 COLL VENOUS BLD VENIPUNCTURE: CPT

## 2021-08-30 PROCEDURE — 86140 C-REACTIVE PROTEIN: CPT

## 2021-08-30 PROCEDURE — 74011000250 HC RX REV CODE- 250: Performed by: NURSE PRACTITIONER

## 2021-08-30 PROCEDURE — 84550 ASSAY OF BLOOD/URIC ACID: CPT

## 2021-08-30 PROCEDURE — 85025 COMPLETE CBC W/AUTO DIFF WBC: CPT

## 2021-08-30 RX ORDER — TRAMADOL HYDROCHLORIDE 50 MG/1
50 TABLET ORAL
Qty: 6 TABLET | Refills: 0 | Status: SHIPPED | OUTPATIENT
Start: 2021-08-30 | End: 2021-09-02

## 2021-08-30 RX ORDER — ONDANSETRON 2 MG/ML
4 INJECTION INTRAMUSCULAR; INTRAVENOUS
Status: COMPLETED | OUTPATIENT
Start: 2021-08-30 | End: 2021-08-30

## 2021-08-30 RX ORDER — METOCLOPRAMIDE 10 MG/1
10 TABLET ORAL
Qty: 28 TABLET | Refills: 0 | Status: SHIPPED | OUTPATIENT
Start: 2021-08-30

## 2021-08-30 RX ORDER — TRAMADOL HYDROCHLORIDE 50 MG/1
50 TABLET ORAL
Status: COMPLETED | OUTPATIENT
Start: 2021-08-30 | End: 2021-08-30

## 2021-08-30 RX ADMIN — MAGNESIUM HYDROXIDE/ALUMINUM HYDROXICE/SIMETHICONE 40 ML: 120; 1200; 1200 SUSPENSION ORAL at 16:11

## 2021-08-30 RX ADMIN — ONDANSETRON 4 MG: 2 INJECTION INTRAMUSCULAR; INTRAVENOUS at 17:12

## 2021-08-30 RX ADMIN — TRAMADOL HYDROCHLORIDE 50 MG: 50 TABLET, FILM COATED ORAL at 17:12

## 2021-08-30 RX ADMIN — SODIUM CHLORIDE 40 MG: 9 INJECTION, SOLUTION INTRAMUSCULAR; INTRAVENOUS; SUBCUTANEOUS at 16:11

## 2021-08-30 NOTE — ED NOTES
Pt presents to ED ambulatory complaining of abdominal pain x \"a while\". Pt reports he was seen at [facility] for the same symptoms and was given follow-up w/ GI. Pt was unable to follow up and is still having abdominal pain, nausea and vomiting. Pt also complaining of wrist pain x 4days. Pt denies injury or trauma to area. Pt is alert and oriented x 4, RR even and unlabored, skin is warm and dry. Assessment completed and pt updated on plan of care. Call bell in reach. Emergency Department Nursing Plan of Care       The Nursing Plan of Care is developed from the Nursing assessment and Emergency Department Attending provider initial evaluation. The plan of care may be reviewed in the ED Provider note.     The Plan of Care was developed with the following considerations:   Patient / Family readiness to learn indicated by:verbalized understanding  Persons(s) to be included in education: patient  Barriers to Learning/Limitations:No    Signed     Imani Mcqueen RN    8/30/2021   3:43 PM

## 2021-08-30 NOTE — ED PROVIDER NOTES
EMERGENCY DEPARTMENT HISTORY AND PHYSICAL EXAM      Date: 8/30/2021  Patient Name: Matthew Galeano    History of Presenting Illness     Chief Complaint   Patient presents with    Nausea    Abdominal Pain       History Provided By: Patient    Additional History (Context): Matthew Galeano is a 64 y.o. male with HTN, DM, Arthritis, GERD  who presents with the pain. Patient states symptoms began greater than 1 month ago. States he has been seen multiple times in the ER and referred to GI but has not followed up. Patient reports intermittent hiccups and passing gas. Associated symptoms include nausea but no vomiting. He also complains of right wrist pain. Began 4 days ago. Denies injury to wrist.  Reports history of arthritis and gout but not in his wrist.  Reports excessive movement of the wrist due to playing drums prior to symptom onset. Associated symptoms include swelling and limited range of motion of the wrist.  No redness, warmth, fever, chills. He reports taking colchicine thinking it was his gout but no relief. Also reports taking multiple Advil. PCP: Kimo Anderson, DO    Current Outpatient Medications   Medication Sig Dispense Refill    traMADoL (ULTRAM) 50 mg tablet Take 1 Tablet by mouth every six (6) hours as needed for Pain for up to 3 days. Max Daily Amount: 200 mg. 6 Tablet 0    metoclopramide HCl (Reglan) 10 mg tablet Take 1 Tablet by mouth every six (6) hours as needed for Nausea. 28 Tablet 0    atorvastatin (LIPITOR) 10 mg tablet Take 10 mg by mouth daily.  dulaglutide (TRULICITY) 1.5 KK/4.2 mL sub-q pen 1.5 mg by SubCUTAneous route every seven (7) days.  colchicine 0.6 mg tablet Take 0.6 mg by mouth daily.  NIFEdipine ER (ADALAT CC) 90 mg ER tablet Take 90 mg by mouth daily. 0    melatonin 5 mg cap capsule Take 5 mg by mouth nightly.  traZODone (DESYREL) 50 mg tablet Take 50 mg by mouth as needed for Sleep.       irbesartan (AVAPRO) 300 mg tablet Take 300 mg by mouth daily.  tamsulosin (FLOMAX) 0.4 mg capsule Take 0.4 mg by mouth daily.  aspirin 81 mg chewable tablet Take 81 mg by mouth daily.  metFORMIN (GLUCOPHAGE) 1,000 mg tablet Take 1,000 mg by mouth two (2) times daily (with meals).  ondansetron hcl (Zofran) 4 mg tablet Take 1 Tablet by mouth every eight (8) hours as needed for Nausea. (Patient not taking: Reported on 8/30/2021) 20 Tablet 0    omeprazole (PRILOSEC) 20 mg capsule Take 1 Capsule by mouth daily. (Patient not taking: Reported on 8/30/2021) 30 Capsule 0    methocarbamoL (ROBAXIN) 500 mg tablet Take 1 Tab by mouth three (3) times daily. (Patient not taking: Reported on 8/30/2021) 30 Tab 0    lidocaine 4 % patch 1 Patch by TransDERmal route every twelve (12) hours every twelve (12) hours. (Patient not taking: Reported on 8/26/2021) 5 Patch 2    lidocaine (LIDODERM) 5 % Apply patch to the affected area for 12 hours a day and remove for 12 hours a day. (Patient not taking: Reported on 8/30/2021) 5 Each 0       Past History     Past Medical History:  Past Medical History:   Diagnosis Date    Arthritis     Chronic pain     Diabetes ([address]     GERD (gastroesophageal reflux disease)     Gout     Hypertension     Other ill-defined conditions(799.89)     gout       Past Surgical History:  Past Surgical History:   Procedure Laterality Date    HX APPENDECTOMY      HX ORTHOPAEDIC      RIGHT knee scope       Family History:  Family History   Problem Relation Age of Onset    Hypertension Mother     Diabetes Mother     Heart Disease Father        Social History:  Social History     Tobacco Use    Smoking status: Never Smoker    Smokeless tobacco: Never Used   Substance Use Topics    Alcohol use: Yes     Comment: occassionally \"weekends\"    Drug use: No       Allergies:   Allergies   Allergen Reactions    Uloric [Febuxostat] Anaphylaxis    Allopurinol Hives    Hydralazine Other (comments)         Review of Systems   Review of Systems   Constitutional: Negative for chills and fever. HENT: Negative for congestion, ear pain, rhinorrhea, sneezing and sore throat. Respiratory: Negative for cough and shortness of breath. Cardiovascular: Negative for chest pain and leg swelling. Gastrointestinal: Positive for abdominal pain and nausea. Negative for constipation, diarrhea and vomiting. Genitourinary: Negative for dysuria, frequency and urgency. Musculoskeletal: Positive for arthralgias and joint swelling. Negative for back pain, gait problem and neck pain. Skin: Negative for wound. Neurological: Negative for dizziness, numbness and headaches. All other systems reviewed and are negative. Physical Exam     Vitals:    08/30/21 1358 08/30/21 1712   BP: 127/81 122/78   Pulse: 87 81   Resp: 20 18   Temp: 97.8 °F (36.6 °C)    SpO2: 95% 98%   Weight: 105.2 kg (232 lb)    Height: 5' 6\" (1.676 m)      Physical Exam  Vitals and nursing note reviewed. Constitutional:       General: He is not in acute distress. Appearance: He is well-developed. He is not ill-appearing. HENT:      Head: Normocephalic and atraumatic. Right Ear: Tympanic membrane, ear canal and external ear normal.      Left Ear: Tympanic membrane, ear canal and external ear normal.      Nose: Nose normal.      Mouth/Throat:      Mouth: Mucous membranes are moist.      Pharynx: Oropharynx is clear. No oropharyngeal exudate or posterior oropharyngeal erythema. Eyes:      Extraocular Movements: Extraocular movements intact. Conjunctiva/sclera: Conjunctivae normal.      Pupils: Pupils are equal, round, and reactive to light. Cardiovascular:      Rate and Rhythm: Normal rate and regular rhythm. Pulses: Normal pulses. Heart sounds: Normal heart sounds. Pulmonary:      Effort: Pulmonary effort is normal.      Breath sounds: Normal breath sounds. Abdominal:      General: Bowel sounds are normal. There is no distension.       Palpations: Abdomen is soft. Tenderness: There is abdominal tenderness in the epigastric area. There is no right CVA tenderness, left CVA tenderness, guarding or rebound. Musculoskeletal:      Right forearm: Normal.      Right wrist: Swelling and tenderness (R distal radius) present. No deformity or crepitus. Decreased range of motion. Normal pulse. Right hand: Normal.      Cervical back: Normal range of motion and neck supple. Lymphadenopathy:      Cervical: No cervical adenopathy. Skin:     General: Skin is warm and dry. Neurological:      Mental Status: He is alert and oriented to person, place, and time. GCS: GCS eye subscore is 4. GCS verbal subscore is 5. GCS motor subscore is 6. Cranial Nerves: No cranial nerve deficit. Psychiatric:         Thought Content: Thought content normal.           Diagnostic Study Results     Labs -     No results found for this or any previous visit (from the past 12 hour(s)). Radiologic Studies -   XR WRIST RT AP/LAT/OBL MIN 3V   Final Result   1. Progressive degenerative changes of the radiocarpal joint, triscaphe joint   and first Aia 16 joint. CT Results  (Last 48 hours)    None        CXR Results  (Last 48 hours)    None            Medical Decision Making   I am the first provider for this patient. I reviewed the vital signs, available nursing notes, past medical history, past surgical history, family history and social history. Vital Signs-Reviewed the patient's vital signs. Records Reviewed: Nursing Notes, Old Medical Records, Previous Radiology Studies and Previous Laboratory Studies  68-year-old male presenting for nausea and abdominal pain exhibiting epigastric tenderness but no guarding or rigidity on exam.  Abdominal pain appears to be chronic. Patient requires follow-up with GI. Likely symptoms have worsened due to NSAID usage. Will give GI cocktail and Pepcid.   In addition will obtain x-ray of wrist    Differential diagnoses include GERD, peptic ulcer disorder, esophagitis, arthritis, gout  ED Course:   ED Course as of Aug 31 1407   Mon Aug 30, 2021   1708  remarkable for degenerative joint disease of wrist.  Creatinine 1.51 however appears to be baseline with Hx of CKD. Otherwise labs are unremarkable. Advised patient needs follow-up with GI to get EGD regarding his chronic abdominal pain and hiccups. Will try Reglan for his symptoms. [NA]      ED Course User Index  [NA] Supa Erickson NP         Disposition:  Discharge     DISCHARGE NOTE:   Pt has been reexamined. Patient has no new complaints, changes, or physical findings. Care plan outlined and precautions discussed. All of pt's questions and concerns were addressed. Patient was instructed and agrees to follow up with GI, as well as to return to the ED upon further deterioration. Patient is ready to go home. Follow-up Information     Follow up With Specialties Details Why Contact Info    [facility]  Schedule an appointment as soon as possible for a visit  wrist arthritis [address] [address]    Jeremy Delgadillo MD Internal Medicine, Gastroenterology Schedule an appointment as soon as possible for a visit  GI evaluation [address] [facility]  [address]  [phone]            Discharge Medication List as of 8/30/2021  5:19 PM      START taking these medications    Details   traMADoL (ULTRAM) 50 mg tablet Take 1 Tablet by mouth every six (6) hours as needed for Pain for up to 3 days. Max Daily Amount: 200 mg., Normal, Disp-6 Tablet, R-0      metoclopramide HCl (Reglan) 10 mg tablet Take 1 Tablet by mouth every six (6) hours as needed for Nausea., Normal, Disp-28 Tablet, R-0         CONTINUE these medications which have NOT CHANGED    Details   atorvastatin (LIPITOR) 10 mg tablet Take 10 mg by mouth daily. , Historical Med      dulaglutide (TRULICITY) 1.5 KZ/5.4 mL sub-q pen 1.5 mg by SubCUTAneous route every seven (7) days., Historical Med      colchicine 0.6 mg tablet Take 0.6 mg by mouth daily. , Historical Med      NIFEdipine ER (ADALAT CC) 90 mg ER tablet Take 90 mg by mouth daily. , Historical Med, R-0      melatonin 5 mg cap capsule Take 5 mg by mouth nightly., Historical Med      traZODone (DESYREL) 50 mg tablet Take 50 mg by mouth as needed for Sleep., Historical Med      irbesartan (AVAPRO) 300 mg tablet Take 300 mg by mouth daily. , Historical Med      tamsulosin (FLOMAX) 0.4 mg capsule Take 0.4 mg by mouth daily. , Historical Med      aspirin 81 mg chewable tablet Take 81 mg by mouth daily. , Historical Med      metFORMIN (GLUCOPHAGE) 1,000 mg tablet Take 1,000 mg by mouth two (2) times daily (with meals). , Historical Med      ondansetron hcl (Zofran) 4 mg tablet Take 1 Tablet by mouth every eight (8) hours as needed for Nausea., Normal, Disp-20 Tablet, R-0      omeprazole (PRILOSEC) 20 mg capsule Take 1 Capsule by mouth daily. , Normal, Disp-30 Capsule, R-0      methocarbamoL (ROBAXIN) 500 mg tablet Take 1 Tab by mouth three (3) times daily. , Normal, Disp-30 Tab, R-0      lidocaine 4 % patch 1 Patch by TransDERmal route every twelve (12) hours every twelve (12) hours. , Normal, Disp-5 Patch, R-2      lidocaine (LIDODERM) 5 % Apply patch to the affected area for 12 hours a day and remove for 12 hours a day., Normal, Disp-5 Each, R-0             Provider Notes (Medical Decision Making):     Procedures:  Procedures    Diagnosis     Clinical Impression:   1. Gastroesophageal reflux disease without esophagitis    2.  Osteoarthritis of right wrist, unspecified osteoarthritis type

## 2021-08-30 NOTE — ED TRIAGE NOTES
Pt in with nausea, abdominal pain \"for a while\", seen at [facility] and referred to GI. Pt never followed up with GI. Pt also complaining of severe right wrist pain x 4 days.

## 2021-08-30 NOTE — ED NOTES
Discharge instructions were given to the patient by Idalia Velazquez RN. The patient left the Emergency Department ambulatory, alert and oriented and in no acute distress with 2 prescriptions. The patient was encouraged to call or return to the ED for worsening issues or problems and was encouraged to schedule a follow up appointment for continuing care. The patient verbalized understanding of discharge instructions and prescriptions, all questions were answered. The patient has no further concerns at this time.

## 2021-08-30 NOTE — DISCHARGE INSTRUCTIONS
It was a pleasure taking care of you at [facility] [address] Emergency Department today. We know that when you come to [facility], you are entrusting us with your health, comfort, and safety. Our physicians and nurses honor that trust, and we truly appreciate the opportunity to care for you and your loved ones. We also value our feedback. If you receive a survey about your Emergency Department experience today, please fill it out. We care about our patients' feedback, and we listen to what you have to say. Thank you!

## 2021-12-21 ENCOUNTER — HOSPITAL ENCOUNTER (OUTPATIENT)
Dept: ULTRASOUND IMAGING | Age: 61
Discharge: HOME | End: 2021-12-21
Attending: SURGERY
Payer: MEDICARE

## 2021-12-21 ENCOUNTER — OFFICE VISIT (OUTPATIENT)
Dept: SURGERY | Age: 61
End: 2021-12-21
Payer: MEDICARE

## 2021-12-21 VITALS
TEMPERATURE: 97.3 F | WEIGHT: 231.8 LBS | SYSTOLIC BLOOD PRESSURE: 124 MMHG | DIASTOLIC BLOOD PRESSURE: 68 MMHG | OXYGEN SATURATION: 97 % | HEIGHT: 66 IN | BODY MASS INDEX: 37.25 KG/M2 | RESPIRATION RATE: 20 BRPM | HEART RATE: 90 BPM

## 2021-12-21 DIAGNOSIS — R10.32 LEFT GROIN PAIN: ICD-10-CM

## 2021-12-21 DIAGNOSIS — R10.32 LEFT GROIN PAIN: Primary | ICD-10-CM

## 2021-12-21 PROCEDURE — 99204 OFFICE O/P NEW MOD 45 MIN: CPT | Performed by: SURGERY

## 2021-12-21 PROCEDURE — 76882 US LMTD JT/FCL EVL NVASC XTR: CPT

## 2021-12-21 NOTE — LETTER
12/21/2021    Patient: Carmen Garcia   YOB: 1960   Date of Visit: 12/21/2021     Ciera Peterson MD  [address]    Dear Ciera Peterson MD,      Thank you for referring Mr. Carson Ruiz to [address] [place] [address] for evaluation. My notes for this consultation are attached. If you have questions, please do not hesitate to call me. I look forward to following your patient along with you.       Sincerely,    Christopher Allen MD

## 2021-12-21 NOTE — PROGRESS NOTES
Identified pt with two pt identifiers(name and ). Reviewed record in preparation for visit and have obtained necessary documentation. All patient medications has been reviewed. Chief Complaint   Patient presents with    Possible Hernia     seen at the request of Dr Christen Wilson for eval of left inguinal hernia       Health Maintenance Due   Topic    Colorectal Cancer Screening Combo     Shingrix Vaccine Age 50> (1 of 2)    Medicare Yearly Exam     Lipid Screen        Vitals:    21 0929   BP: 124/68   Pulse: 90   Resp: 20   Temp: 97.3 °F (36.3 °C)   TempSrc: Temporal   SpO2: 97%   Weight: 105.1 kg (231 lb 12.8 oz)   Height: 5' 6\" (1.676 m)   PainSc:  10 - Worst pain ever   PainLoc: Scrotum       4. Have you been to the ER, urgent care clinic since your last visit? Hospitalized since your last visit? [address] about 2 weeks ago for chest pain and severe HA can't remember the outcome. May have been reaction to a medication he was taking not sure what the med was    5. Have you seen or consulted any other health care providers outside of the [address] [place] [address] since your last visit? Include any pap smears or colon screening. No      Patient is accompanied by self I have received verbal consent from Dominick Eduardo to discuss any/all medical information while they are present in the room.

## 2021-12-21 NOTE — PROGRESS NOTES
HISTORY OF PRESENT ILLNESS  Bandar Monique is a 64 y.o. male who comes in for consultation by Dr Narinder Bermudez for a possible hernia  HPI  He started having left groin and testicular discomfort about 2 weeks ago. He saw Dr Morris De Leon who did not find an etiology but thought it could possibly be a hernia and referred him here. It has been getting a little better but is still bothersome. It is worse with activity. He denies associated nausea, vomiting, diarrhea, constipation, melena, hematochezia, dysuria or hematuria but does have urinary frequency. He does not recall an inciting event. He has not had surgery in the area previously or prior similar pain. Past Medical History:   Diagnosis Date    Arthritis     Chronic pain     Diabetes ([facility] [address]     GERD (gastroesophageal reflux disease)     Gout     Hypertension     Other ill-defined conditions(799.89)     gout     Past Surgical History:   Procedure Laterality Date    HX APPENDECTOMY      HX ORTHOPAEDIC      RIGHT knee scope     Family History   Problem Relation Age of Onset    Hypertension Mother     Diabetes Mother     Heart Disease Father      Social History     Tobacco Use    Smoking status: Never Smoker    Smokeless tobacco: Never Used   Substance Use Topics    Alcohol use: Yes     Comment: occassionally \"weekends\"    Drug use: No     Current Outpatient Medications   Medication Sig    metoclopramide HCl (Reglan) 10 mg tablet Take 1 Tablet by mouth every six (6) hours as needed for Nausea.  ondansetron hcl (Zofran) 4 mg tablet Take 1 Tablet by mouth every eight (8) hours as needed for Nausea.  omeprazole (PRILOSEC) 20 mg capsule Take 1 Capsule by mouth daily.  methocarbamoL (ROBAXIN) 500 mg tablet Take 1 Tab by mouth three (3) times daily.  lidocaine (LIDODERM) 5 % Apply patch to the affected area for 12 hours a day and remove for 12 hours a day.  atorvastatin (LIPITOR) 10 mg tablet Take 10 mg by mouth daily.     dulaglutide (TRULICITY) 1.5 REID/4.1 mL sub-q pen 1.5 mg by SubCUTAneous route every seven (7) days.  colchicine 0.6 mg tablet Take 0.6 mg by mouth daily.  NIFEdipine ER (ADALAT CC) 90 mg ER tablet Take 90 mg by mouth daily.  melatonin 5 mg cap capsule Take 5 mg by mouth nightly.  traZODone (DESYREL) 50 mg tablet Take 50 mg by mouth as needed for Sleep.  irbesartan (AVAPRO) 300 mg tablet Take 300 mg by mouth daily.  tamsulosin (FLOMAX) 0.4 mg capsule Take 0.4 mg by mouth daily.  aspirin 81 mg chewable tablet Take 81 mg by mouth daily.  metFORMIN (GLUCOPHAGE) 1,000 mg tablet Take 1,000 mg by mouth two (2) times daily (with meals).  lidocaine 4 % patch 1 Patch by TransDERmal route every twelve (12) hours every twelve (12) hours. (Patient not taking: Reported on 8/26/2021)     No current facility-administered medications for this visit. Allergies   Allergen Reactions    Uloric [Febuxostat] Anaphylaxis    Allopurinol Hives    Hydralazine Other (comments)       Review of Systems   Constitutional: Positive for diaphoresis and malaise/fatigue. Negative for chills, fever and weight loss. HENT: Negative for congestion, ear pain and sore throat. Eyes: Negative for blurred vision and pain. Respiratory: Negative for cough, hemoptysis, sputum production, shortness of breath, wheezing and stridor. Cardiovascular: Negative for chest pain, palpitations, orthopnea, claudication, leg swelling and PND. Gastrointestinal: Positive for abdominal pain. Negative for blood in stool, constipation, diarrhea, heartburn, melena, nausea and vomiting. Genitourinary: Positive for frequency. Negative for dysuria, flank pain, hematuria and urgency. Musculoskeletal: Negative for back pain, joint pain, myalgias and neck pain. Skin: Negative for itching and rash. Neurological: Negative for dizziness, tremors, focal weakness, seizures, weakness and headaches. Endo/Heme/Allergies: Negative for polydipsia. Psychiatric/Behavioral: Negative for depression and memory loss. The patient is not nervous/anxious. Visit Vitals  /68 (BP 1 Location: Right upper arm, BP Patient Position: Sitting)   Pulse 90   Temp 97.3 °F (36.3 °C) (Temporal)   Resp 20   Ht 5' 6\" (1.676 m)   Wt 105.1 kg (231 lb 12.8 oz)   SpO2 97%   BMI 37.41 kg/m²       Physical Exam  Constitutional:       General: He is not in acute distress. Appearance: Normal appearance. He is well-developed. He is not diaphoretic. HENT:      Head: Normocephalic and atraumatic. Mouth/Throat:      Pharynx: No oropharyngeal exudate. Eyes:      General: No scleral icterus. Conjunctiva/sclera: Conjunctivae normal.      Pupils: Pupils are equal, round, and reactive to light. Neck:      Thyroid: No thyromegaly. Trachea: No tracheal deviation. Cardiovascular:      Rate and Rhythm: Normal rate and regular rhythm. Heart sounds: Normal heart sounds. No murmur heard. No friction rub. No gallop. Pulmonary:      Effort: Pulmonary effort is normal. No respiratory distress. Breath sounds: Normal breath sounds. No stridor. No wheezing or rales. Abdominal:      General: Bowel sounds are normal. There is no distension. Palpations: Abdomen is soft. There is no mass. Tenderness: There is no abdominal tenderness. There is no right CVA tenderness, left CVA tenderness, guarding or rebound. Negative signs include Ford's sign and Rovsing's sign. Hernia: No hernia is present. There is no hernia in the ventral area, left inguinal area (tender at external ring but do not feel a hernia) or right inguinal area. Genitourinary:     Penis: Uncircumcised. Testes: Cremasteric reflex is present. Right: Mass, tenderness or swelling not present. Right testis is descended. Cremasteric reflex is present. Left: Tenderness present. Mass or swelling not present. Left testis is descended. Cremasteric reflex is present. Musculoskeletal:         General: No tenderness. Normal range of motion. Cervical back: Normal range of motion and neck supple. Lymphadenopathy:      Cervical: No cervical adenopathy. Skin:     General: Skin is warm and dry. Findings: No erythema or rash. Neurological:      Mental Status: He is alert and oriented to person, place, and time. Cranial Nerves: No cranial nerve deficit. Coordination: Coordination normal.   Psychiatric:         Behavior: Behavior normal.         Thought Content: Thought content normal.         Judgment: Judgment normal.         ASSESSMENT and PLAN  1. Left groin and testicular discomfort. It is unclear if there is an occult hernia. I explained about the anatomy and pathophysiology of hernias and the risk of incarceration and strangulation of the bowel. I explained about hernia repairs (open with and without mesh, and robotic assisted and laparoscopic with mesh). I explained the risks and benefits of repair including bleeding, infection, chronic pain, orchalgia, loss of testes, bowel or bladder injury, hernia recurrence, seroma, mesh infection requiring removal.  I explained it would be a six to eight week recuperation with no driving for 5 - 7 days, no lifting for six weeks. 2.  Severe obesity. Body mass index is 37.41 kg/m². Recommended diet modification and increased exercise    3. BPH and elevated PSA. Followed by Dr Yandy Silverman  On dual therapy  4. NIDDM type 2. Stable on rx per patient  5. Essential hypertension.   Stable on rx      Will check US with stress views in the left groin to r/o hernia    Will call with US results    MD CHIOMA Witt MD FACS

## 2021-12-22 ENCOUNTER — TELEPHONE (OUTPATIENT)
Dept: SURGERY | Age: 61
End: 2021-12-22

## 2022-02-23 ENCOUNTER — OFFICE VISIT (OUTPATIENT)
Dept: URGENT CARE | Age: 62
End: 2022-02-23
Payer: MEDICARE

## 2022-02-23 VITALS
BODY MASS INDEX: 38.73 KG/M2 | DIASTOLIC BLOOD PRESSURE: 91 MMHG | TEMPERATURE: 98.1 F | RESPIRATION RATE: 16 BRPM | WEIGHT: 241 LBS | OXYGEN SATURATION: 96 % | HEIGHT: 66 IN | HEART RATE: 92 BPM | SYSTOLIC BLOOD PRESSURE: 138 MMHG

## 2022-02-23 DIAGNOSIS — R51.9 SCALP PAIN: Primary | ICD-10-CM

## 2022-02-23 PROCEDURE — 3017F COLORECTAL CA SCREEN DOC REV: CPT | Performed by: FAMILY MEDICINE

## 2022-02-23 PROCEDURE — G8417 CALC BMI ABV UP PARAM F/U: HCPCS | Performed by: FAMILY MEDICINE

## 2022-02-23 PROCEDURE — G8427 DOCREV CUR MEDS BY ELIG CLIN: HCPCS | Performed by: FAMILY MEDICINE

## 2022-02-23 PROCEDURE — G8432 DEP SCR NOT DOC, RNG: HCPCS | Performed by: FAMILY MEDICINE

## 2022-02-23 PROCEDURE — 99214 OFFICE O/P EST MOD 30 MIN: CPT | Performed by: FAMILY MEDICINE

## 2022-02-23 RX ORDER — PREDNISONE 20 MG/1
20 TABLET ORAL 2 TIMES DAILY
Qty: 10 TABLET | Refills: 0 | Status: SHIPPED | OUTPATIENT
Start: 2022-02-23 | End: 2022-02-28

## 2022-02-23 RX ORDER — VALACYCLOVIR HYDROCHLORIDE 1 G/1
1000 TABLET, FILM COATED ORAL 3 TIMES DAILY
Qty: 21 TABLET | Refills: 0 | Status: SHIPPED | OUTPATIENT
Start: 2022-02-23

## 2022-02-23 RX ORDER — BUTALBITAL, ACETAMINOPHEN AND CAFFEINE 300; 40; 50 MG/1; MG/1; MG/1
1 CAPSULE ORAL
Qty: 12 CAPSULE | Refills: 0 | Status: SHIPPED | OUTPATIENT
Start: 2022-02-23

## 2022-02-23 NOTE — PATIENT INSTRUCTIONS

## 2022-02-23 NOTE — PROGRESS NOTES
Headache   The history is provided by the patient. This is a new problem. The current episode started yesterday. The problem occurs constantly. The problem has not changed since onset. The pain is located in the parietal and left unilateral region. The quality of the pain is described as sharp and throbbing (pain is not constant- comes in sudden  random burst and then resolves). The pain is at a severity of 10/10 (when it hits and then subsides quickly ). Pertinent negatives include no anorexia, no fever, no malaise/fatigue, no near-syncope, no palpitations, no syncope, no shortness of breath, no weakness, no tingling, no dizziness, no visual change, no nausea and no vomiting. Associated symptoms comments: Local sensitivity to touch to the involved area on left parietal area . He has tried nothing for the symptoms.         Past Medical History:   Diagnosis Date    Arthritis     Chronic pain     Diabetes ([address]     GERD (gastroesophageal reflux disease)     Gout     Hypertension     Other ill-defined conditions(799.89)     gout        Past Surgical History:   Procedure Laterality Date    HX APPENDECTOMY      HX ORTHOPAEDIC      RIGHT knee scope         Family History   Problem Relation Age of Onset    Hypertension Mother     Diabetes Mother     Heart Disease Father         Social History     Socioeconomic History    Marital status: SINGLE     Spouse name: Not on file    Number of children: Not on file    Years of education: Not on file    Highest education level: Not on file   Occupational History    Not on file   Tobacco Use    Smoking status: Never Smoker    Smokeless tobacco: Never Used   Substance and Sexual Activity    Alcohol use: Yes     Comment: occassionally \"weekends\"    Drug use: No    Sexual activity: Not Currently   Other Topics Concern    Not on file   Social History Narrative    Not on file     Social Determinants of Health     Financial Resource Strain:     Difficulty of Paying Living Expenses: Not on file   Food Insecurity:     Worried About [address] in the Last Year: Not on file    [place] of Food in the Last Year: Not on file   Transportation Needs:     Lack of Transportation (Medical): Not on file    Lack of Transportation (Non-Medical): Not on file   Physical Activity:     Days of Exercise per Week: Not on file    Minutes of Exercise per Session: Not on file   Stress:     Feeling of Stress : Not on file   Social Connections:     Frequency of Communication with Friends and Family: Not on file    Frequency of Social Gatherings with Friends and Family: Not on file    Attends Jewish Services: Not on file    Active Member of [address] [place] [address] or Organizations: Not on file    Attends Club or Organization Meetings: Not on file    Marital Status: Not on file   Intimate Partner Violence:     Fear of Current or Ex-Partner: Not on file    Emotionally Abused: Not on file    Physically Abused: Not on file    Sexually Abused: Not on file   Housing Stability:     Unable to Pay for Housing in the Last Year: Not on file    Number of [place] in the Last Year: Not on file    Unstable Housing in the Last Year: Not on file                ALLERGIES: Uloric [febuxostat], Allopurinol, and Hydralazine    Review of Systems   Constitutional: Negative for fever and malaise/fatigue. Respiratory: Negative for shortness of breath. Cardiovascular: Negative for palpitations, syncope and near-syncope. Gastrointestinal: Negative for anorexia, nausea and vomiting. Skin: Negative for rash. Neurological: Positive for headaches. Negative for dizziness, tingling, weakness, light-headedness and numbness. All other systems reviewed and are negative. Vitals:    02/23/22 1003   BP: (!) 138/91   Pulse: 92   Resp: 16   Temp: 98.1 °F (36.7 °C)   SpO2: 96%   Weight: 241 lb (109.3 kg)   Height: 5' 6\" (1.676 m)       Physical Exam  Vitals and nursing note reviewed.    Constitutional: General: He is not in acute distress. HENT:      Head:        Right Ear: Tympanic membrane and ear canal normal.      Left Ear: Tympanic membrane and ear canal normal.      Nose: Nose normal.      Mouth/Throat:      Pharynx: No oropharyngeal exudate or posterior oropharyngeal erythema. Eyes:      General:         Right eye: No discharge. Left eye: No discharge. Conjunctiva/sclera: Conjunctivae normal.   Pulmonary:      Effort: Pulmonary effort is normal. No respiratory distress. Breath sounds: Normal breath sounds. No wheezing or rales. Musculoskeletal:      Cervical back: Neck supple. Lymphadenopathy:      Cervical: No cervical adenopathy. Skin:     Findings: No rash. MDM    Procedures        ICD-10-CM ICD-9-CM    1. Scalp pain  R51.9 784.0     on left parital area around left ear- sharp stabbing pain with local paresthesia- no local rash      Medications Ordered Today   Medications    butalbital-acetaminophen-caff (Fioricet) -40 mg per capsule     Sig: Take 1 Capsule by mouth every six (6) hours as needed for Headache. Dispense:  12 Capsule     Refill:  0    valACYclovir (VALTREX) 1 gram tablet     Sig: Take 1 Tablet by mouth three (3) times daily. Dispense:  21 Tablet     Refill:  0    predniSONE (DELTASONE) 20 mg tablet     Sig: Take 20 mg by mouth two (2) times a day for 5 days. With food     Dispense:  10 Tablet     Refill:  0     No results found for any visits on 02/23/22. The patients condition was discussed with the patient and they understand. The patient is to follow up with primary care doctor. If signs and symptoms become worse the pt is to go to the ER. The patient is to take medications as prescribed.

## 2022-05-21 ENCOUNTER — HOSPITAL ENCOUNTER (EMERGENCY)
Age: 62
Discharge: HOME | End: 2022-05-22
Attending: EMERGENCY MEDICINE
Payer: MEDICARE

## 2022-05-21 ENCOUNTER — APPOINTMENT (OUTPATIENT)
Dept: CT IMAGING | Age: 62
End: 2022-05-21
Attending: EMERGENCY MEDICINE
Payer: MEDICARE

## 2022-05-21 VITALS
TEMPERATURE: 98.4 F | SYSTOLIC BLOOD PRESSURE: 142 MMHG | HEART RATE: 67 BPM | OXYGEN SATURATION: 98 % | WEIGHT: 242.95 LBS | RESPIRATION RATE: 18 BRPM | HEIGHT: 67 IN | DIASTOLIC BLOOD PRESSURE: 97 MMHG | BODY MASS INDEX: 38.13 KG/M2

## 2022-05-21 DIAGNOSIS — M62.838 NECK MUSCLE SPASM: ICD-10-CM

## 2022-05-21 DIAGNOSIS — R51.9 ACUTE INTRACTABLE HEADACHE, UNSPECIFIED HEADACHE TYPE: Primary | ICD-10-CM

## 2022-05-21 LAB
ALBUMIN SERPL-MCNC: 4 G/DL (ref 3.5–5)
ALBUMIN/GLOB SERPL: 1.1 {RATIO} (ref 1.1–2.2)
ALP SERPL-CCNC: 51 U/L (ref 45–117)
ALT SERPL-CCNC: 34 U/L (ref 12–78)
ANION GAP SERPL CALC-SCNC: 6 MMOL/L (ref 5–15)
AST SERPL-CCNC: 21 U/L (ref 15–37)
BASOPHILS # BLD: 0 K/UL (ref 0–0.1)
BASOPHILS NFR BLD: 0 % (ref 0–1)
BILIRUB SERPL-MCNC: 0.4 MG/DL (ref 0.2–1)
BUN SERPL-MCNC: 20 MG/DL (ref 6–20)
BUN/CREAT SERPL: 12 (ref 12–20)
CALCIUM SERPL-MCNC: 9.3 MG/DL (ref 8.5–10.1)
CHLORIDE SERPL-SCNC: 106 MMOL/L (ref 97–108)
CO2 SERPL-SCNC: 27 MMOL/L (ref 21–32)
CREAT SERPL-MCNC: 1.71 MG/DL (ref 0.7–1.3)
DIFFERENTIAL METHOD BLD: NORMAL
EOSINOPHIL # BLD: 0.1 K/UL (ref 0–0.4)
EOSINOPHIL NFR BLD: 1 % (ref 0–7)
ERYTHROCYTE [DISTWIDTH] IN BLOOD BY AUTOMATED COUNT: 13.8 % (ref 11.5–14.5)
ERYTHROCYTE [SEDIMENTATION RATE] IN BLOOD: 7 MM/HR (ref 0–20)
GLOBULIN SER CALC-MCNC: 3.5 G/DL (ref 2–4)
GLUCOSE SERPL-MCNC: 184 MG/DL (ref 65–100)
HCT VFR BLD AUTO: 41.6 % (ref 36.6–50.3)
HGB BLD-MCNC: 13.6 G/DL (ref 12.1–17)
IMM GRANULOCYTES # BLD AUTO: 0 K/UL (ref 0–0.04)
IMM GRANULOCYTES NFR BLD AUTO: 0 % (ref 0–0.5)
LYMPHOCYTES # BLD: 2.3 K/UL (ref 0.8–3.5)
LYMPHOCYTES NFR BLD: 32 % (ref 12–49)
MCH RBC QN AUTO: 29.6 PG (ref 26–34)
MCHC RBC AUTO-ENTMCNC: 32.7 G/DL (ref 30–36.5)
MCV RBC AUTO: 90.6 FL (ref 80–99)
MONOCYTES # BLD: 0.8 K/UL (ref 0–1)
MONOCYTES NFR BLD: 11 % (ref 5–13)
NEUTS SEG # BLD: 3.9 K/UL (ref 1.8–8)
NEUTS SEG NFR BLD: 56 % (ref 32–75)
NRBC # BLD: 0 K/UL (ref 0–0.01)
NRBC BLD-RTO: 0 PER 100 WBC
PLATELET # BLD AUTO: 214 K/UL (ref 150–400)
PMV BLD AUTO: 9.3 FL (ref 8.9–12.9)
POTASSIUM SERPL-SCNC: 4.1 MMOL/L (ref 3.5–5.1)
PROT SERPL-MCNC: 7.5 G/DL (ref 6.4–8.2)
RBC # BLD AUTO: 4.59 M/UL (ref 4.1–5.7)
SODIUM SERPL-SCNC: 139 MMOL/L (ref 136–145)
WBC # BLD AUTO: 7.1 K/UL (ref 4.1–11.1)

## 2022-05-21 PROCEDURE — 74011000636 HC RX REV CODE- 636: Performed by: EMERGENCY MEDICINE

## 2022-05-21 PROCEDURE — 74011250636 HC RX REV CODE- 250/636: Performed by: EMERGENCY MEDICINE

## 2022-05-21 PROCEDURE — 85652 RBC SED RATE AUTOMATED: CPT

## 2022-05-21 PROCEDURE — 96375 TX/PRO/DX INJ NEW DRUG ADDON: CPT

## 2022-05-21 PROCEDURE — 85025 COMPLETE CBC W/AUTO DIFF WBC: CPT

## 2022-05-21 PROCEDURE — 70496 CT ANGIOGRAPHY HEAD: CPT

## 2022-05-21 PROCEDURE — 96374 THER/PROPH/DIAG INJ IV PUSH: CPT

## 2022-05-21 PROCEDURE — 36415 COLL VENOUS BLD VENIPUNCTURE: CPT

## 2022-05-21 PROCEDURE — 99285 EMERGENCY DEPT VISIT HI MDM: CPT

## 2022-05-21 PROCEDURE — 70450 CT HEAD/BRAIN W/O DYE: CPT

## 2022-05-21 PROCEDURE — 93005 ELECTROCARDIOGRAM TRACING: CPT

## 2022-05-21 PROCEDURE — 80053 COMPREHEN METABOLIC PANEL: CPT

## 2022-05-21 RX ORDER — PROCHLORPERAZINE EDISYLATE 5 MG/ML
INJECTION INTRAMUSCULAR; INTRAVENOUS
Status: DISPENSED
Start: 2022-05-21 | End: 2022-05-22

## 2022-05-21 RX ORDER — PROCHLORPERAZINE EDISYLATE 5 MG/ML
5 INJECTION INTRAMUSCULAR; INTRAVENOUS
Status: COMPLETED | OUTPATIENT
Start: 2022-05-21 | End: 2022-05-21

## 2022-05-21 RX ORDER — KETOROLAC TROMETHAMINE 30 MG/ML
INJECTION, SOLUTION INTRAMUSCULAR; INTRAVENOUS
Status: DISPENSED
Start: 2022-05-21 | End: 2022-05-22

## 2022-05-21 RX ORDER — KETOROLAC TROMETHAMINE 30 MG/ML
30 INJECTION, SOLUTION INTRAMUSCULAR; INTRAVENOUS
Status: COMPLETED | OUTPATIENT
Start: 2022-05-21 | End: 2022-05-21

## 2022-05-21 RX ADMIN — PROCHLORPERAZINE EDISYLATE 5 MG: 5 INJECTION INTRAMUSCULAR; INTRAVENOUS at 19:49

## 2022-05-21 RX ADMIN — SODIUM CHLORIDE 1000 ML: 9 INJECTION, SOLUTION INTRAVENOUS at 20:51

## 2022-05-21 RX ADMIN — IOPAMIDOL 100 ML: 755 INJECTION, SOLUTION INTRAVENOUS at 20:48

## 2022-05-21 RX ADMIN — KETOROLAC TROMETHAMINE 30 MG: 30 INJECTION, SOLUTION INTRAMUSCULAR at 19:50

## 2022-05-21 NOTE — ED PROVIDER NOTES
EMERGENCY DEPARTMENT HISTORY AND PHYSICAL EXAM      Date: 5/21/2022  Patient Name: Allie Spaulding    History of Presenting Illness     Chief Complaint   Patient presents with    Headache     woke up with a headache this morning and it is a sharp electrical pain over right temple and parietal lobes; it is sore; arrives by rescue-BeFast is negative. bgl 210; no head injury-pt is not on a blood thinner. History Provided By: Patient    HPI: Allie Spaulding, 64 y.o. male presents to the ED with cc of headache. 66-year-old male with the below past medical history presents to the emergency department with a chief complaint of headache. Reports headache began this morning. Reports a \"electric-like\" right-sided headache. Reports pain began in his temple and radiates over the surface of his scalp and down his right neck. Denies any trauma. Reports both eyes were \"tearing\" but no runny nose or other autonomic symptoms. Denies any focal numbness or weakness. But does report feeling generally \"weak. \"  No nausea or vomiting. No chest pain or shortness of breath. No jaw claudication. No vision changes. No fevers or chills. There are no other complaints, changes, or physical findings at this time. PCP: Adrian Mckeon NP    No current facility-administered medications on file prior to encounter. Current Outpatient Medications on File Prior to Encounter   Medication Sig Dispense Refill    butalbital-acetaminophen-caff (Fioricet) -40 mg per capsule Take 1 Capsule by mouth every six (6) hours as needed for Headache. 12 Capsule 0    valACYclovir (VALTREX) 1 gram tablet Take 1 Tablet by mouth three (3) times daily. 21 Tablet 0    metoclopramide HCl (Reglan) 10 mg tablet Take 1 Tablet by mouth every six (6) hours as needed for Nausea. 28 Tablet 0    ondansetron hcl (Zofran) 4 mg tablet Take 1 Tablet by mouth every eight (8) hours as needed for Nausea.  20 Tablet 0    omeprazole (PRILOSEC) 20 mg capsule Take 1 Capsule by mouth daily. 30 Capsule 0    methocarbamoL (ROBAXIN) 500 mg tablet Take 1 Tab by mouth three (3) times daily. 30 Tab 0    lidocaine 4 % patch 1 Patch by TransDERmal route every twelve (12) hours every twelve (12) hours. (Patient not taking: Reported on 8/26/2021) 5 Patch 2    lidocaine (LIDODERM) 5 % Apply patch to the affected area for 12 hours a day and remove for 12 hours a day. 5 Each 0    atorvastatin (LIPITOR) 10 mg tablet Take 10 mg by mouth daily.  dulaglutide (TRULICITY) 1.5 LS/9.6 mL sub-q pen 1.5 mg by SubCUTAneous route every seven (7) days.  colchicine 0.6 mg tablet Take 0.6 mg by mouth daily.  NIFEdipine ER (ADALAT CC) 90 mg ER tablet Take 90 mg by mouth daily. 0    melatonin 5 mg cap capsule Take 5 mg by mouth nightly.  traZODone (DESYREL) 50 mg tablet Take 50 mg by mouth as needed for Sleep.  irbesartan (AVAPRO) 300 mg tablet Take 300 mg by mouth daily.  tamsulosin (FLOMAX) 0.4 mg capsule Take 0.4 mg by mouth daily.  aspirin 81 mg chewable tablet Take 81 mg by mouth daily.  metFORMIN (GLUCOPHAGE) 1,000 mg tablet Take 1,000 mg by mouth two (2) times daily (with meals).          Past History     Past Medical History:  Past Medical History:   Diagnosis Date    Arthritis     Chronic pain     Diabetes ([address]     GERD (gastroesophageal reflux disease)     Gout     Hypertension     Other ill-defined conditions(799.89)     gout       Past Surgical History:  Past Surgical History:   Procedure Laterality Date    HX APPENDECTOMY      HX ORTHOPAEDIC      RIGHT knee scope       Family History:  Family History   Problem Relation Age of Onset    Hypertension Mother     Diabetes Mother     Heart Disease Father        Social History:  Social History     Tobacco Use    Smoking status: Never Smoker    Smokeless tobacco: Never Used   Substance Use Topics    Alcohol use: Yes     Comment: occassionally \"weekends\"    Drug use: No       Allergies: Allergies   Allergen Reactions    Uloric [Febuxostat] Anaphylaxis    Allopurinol Hives    Hydralazine Other (comments)         Review of Systems   Review of Systems   Constitutional: Negative for fever. HENT: Negative for ear pain, facial swelling and voice change. Eyes: Negative for pain, discharge, redness and visual disturbance. Respiratory: Negative for cough and chest tightness. Cardiovascular: Negative for chest pain and leg swelling. Gastrointestinal: Negative for abdominal pain, diarrhea, nausea and vomiting. Genitourinary: Negative for hematuria. Musculoskeletal: Negative for gait problem. Skin: Negative for color change, pallor and rash. Neurological: Positive for headaches. Negative for facial asymmetry and weakness. Hematological: Does not bruise/bleed easily. Psychiatric/Behavioral: Negative for behavioral problems. All other systems reviewed and are negative. Physical Exam   Physical Exam  Vitals and nursing note reviewed. Constitutional:       Comments: 79-year-old male, resting in bed, no acute distress   HENT:      Head: Normocephalic and atraumatic. Nose: Nose normal.      Mouth/Throat:      Mouth: Mucous membranes are moist.   Eyes:      Pupils: Pupils are equal, round, and reactive to light. Cardiovascular:      Rate and Rhythm: Normal rate and regular rhythm. Pulses: Normal pulses. Heart sounds: No murmur heard. No friction rub. No gallop. Pulmonary:      Effort: Pulmonary effort is normal.      Breath sounds: Normal breath sounds. No wheezing, rhonchi or rales. Abdominal:      General: Abdomen is flat. There is no distension. Palpations: Abdomen is soft. Tenderness: There is no abdominal tenderness. Musculoskeletal:         General: Normal range of motion. Cervical back: Normal range of motion. Right lower leg: No edema. Left lower leg: No edema.    Skin:     General: Skin is warm and dry.      Capillary Refill: Capillary refill takes less than 2 seconds. Findings: No rash. Comments: Right parietal and temporal scalp tender to light touch, but no zoster rash   Neurological:      General: No focal deficit present. Mental Status: He is alert. Cranial Nerves: No cranial nerve deficit. Sensory: No sensory deficit. Motor: No weakness. Psychiatric:         Mood and Affect: Mood normal.         Diagnostic Study Results     Labs -     Recent Results (from the past 12 hour(s))   CBC WITH AUTOMATED DIFF    Collection Time: 05/21/22  7:35 PM   Result Value Ref Range    WBC 7.1 4.1 - 11.1 K/uL    RBC 4.59 4. 10 - 5.70 M/uL    HGB 13.6 12.1 - 17.0 g/dL    HCT 41.6 36.6 - 50.3 %    MCV 90.6 80.0 - 99.0 FL    MCH 29.6 26.0 - 34.0 PG    MCHC 32.7 30.0 - 36.5 g/dL    RDW 13.8 11.5 - 14.5 %    PLATELET 106 493 - 202 K/uL    MPV 9.3 8.9 - 12.9 FL    NRBC 0.0 0  WBC    ABSOLUTE NRBC 0.00 0.00 - 0.01 K/uL    NEUTROPHILS 56 32 - 75 %    LYMPHOCYTES 32 12 - 49 %    MONOCYTES 11 5 - 13 %    EOSINOPHILS 1 0 - 7 %    BASOPHILS 0 0 - 1 %    IMMATURE GRANULOCYTES 0 0.0 - 0.5 %    ABS. NEUTROPHILS 3.9 1.8 - 8.0 K/UL    ABS. LYMPHOCYTES 2.3 0.8 - 3.5 K/UL    ABS. MONOCYTES 0.8 0.0 - 1.0 K/UL    ABS. EOSINOPHILS 0.1 0.0 - 0.4 K/UL    ABS. BASOPHILS 0.0 0.0 - 0.1 K/UL    ABS. IMM.  GRANS. 0.0 0.00 - 0.04 K/UL    DF AUTOMATED     METABOLIC PANEL, COMPREHENSIVE    Collection Time: 05/21/22  7:35 PM   Result Value Ref Range    Sodium 139 136 - 145 mmol/L    Potassium 4.1 3.5 - 5.1 mmol/L    Chloride 106 97 - 108 mmol/L    CO2 27 21 - 32 mmol/L    Anion gap 6 5 - 15 mmol/L    Glucose 184 (H) 65 - 100 mg/dL    BUN 20 6 - 20 MG/DL    Creatinine 1.71 (H) 0.70 - 1.30 MG/DL    BUN/Creatinine ratio 12 12 - 20      GFR est AA 50 (L) >60 ml/min/1.73m2    GFR est non-AA 41 (L) >60 ml/min/1.73m2    Calcium 9.3 8.5 - 10.1 MG/DL    Bilirubin, total 0.4 0.2 - 1.0 MG/DL    ALT (SGPT) 34 12 - 78 U/L    AST (SGOT) 21 15 - 37 U/L    Alk. phosphatase 51 45 - 117 U/L    Protein, total 7.5 6.4 - 8.2 g/dL    Albumin 4.0 3.5 - 5.0 g/dL    Globulin 3.5 2.0 - 4.0 g/dL    A-G Ratio 1.1 1.1 - 2.2     SED RATE (ESR)    Collection Time: 05/21/22  7:35 PM   Result Value Ref Range    Sed rate, automated 7 0 - 20 mm/hr   EKG, 12 LEAD, INITIAL    Collection Time: 05/21/22  7:49 PM   Result Value Ref Range    Ventricular Rate 76 BPM    Atrial Rate 76 BPM    P-R Interval 160 ms    QRS Duration 82 ms    Q-T Interval 370 ms    QTC Calculation (Bezet) 416 ms    Calculated P Axis 65 degrees    Calculated R Axis -53 degrees    Calculated T Axis -2 degrees    Diagnosis       Normal sinus rhythm  Left anterior fascicular block  Cannot rule out Inferior infarct (cited on or before 21-MAY-2022)  Anterior infarct , age undetermined  When compared with ECG of 23-JUL-2021 16:40,  No significant change was found         Radiologic Studies -   CT HEAD WO CONT    (Results Pending)   CTA HEAD NECK W CONT    (Results Pending)     CT Results  (Last 48 hours)    None        CXR Results  (Last 48 hours)    None          Medical Decision Making   I am the first provider for this patient. I reviewed the vital signs, available nursing notes, past medical history, past surgical history, family history and social history. Vital Signs-Reviewed the patient's vital signs. Patient Vitals for the past 12 hrs:   Temp Pulse Resp BP SpO2   05/21/22 2330 -- 67 18 (!) 142/97 98 %   05/21/22 2123 -- 77 14 130/87 97 %   05/21/22 2108 -- 69 14 129/86 95 %   05/21/22 2056 -- 72 18 123/84 96 %   05/21/22 2008 -- -- -- 127/81 96 %   05/21/22 1937 -- -- -- 128/85 95 %   05/21/22 1904 98.4 °F (36.9 °C) 77 14 126/73 98 %     Records Reviewed: Nursing Notes, Old Medical Records and Previous Laboratory Studies    Provider Notes (Medical Decision Making):     28-year-old male presents with right-sided headache. Describes headache as \"electric-like. \"  Denies any other symptoms. Denies focal weakness but does report feeling generally \"weak\". Will check EKG given near syncope. No neurodeficits. Will check EKG given generalized weakness. Check basic labs including ESR, little bit atypical for temporal arteritis, consider zoster but no rash, less likely cluster headache. Doubt meningitis, encephalitis or subarachnoid hemorrhage based on history. ED Course:   Initial assessment performed. The patients presenting problems have been discussed, and they are in agreement with the care plan formulated and outlined with them. I have encouraged them to ask questions as they arise throughout their visit. ED Course as of 05/22/22 0334   Sat May 21, 2022   2130 Preliminary EKG interpreted by me. Shows normal sinus rhythm with a HR of 76. No ST elevations or depressions concerning for ischemia. Normal intervals. [MB]   2130 CBC and CMP are unremarkable. ESR is normal. [MB]   2134 Reassessed, headache improved. [MB]   Sun May 22, 2022   0011 Results did not populate in [facility] care, however wet read by radiology states CTs are negative. Patient reassessed reports improved pain. Will discharge with Robaxin given significant paracervical spasm. Empiric prescription for Valtrex. Return precautions discussed. Patient agreeable discharge. [MB]      ED Course User Index  [MB] MD Mike Deluna MD      Disposition:    Discharged    DISCHARGE PLAN:  1. Discharge Medication List as of 5/22/2022 12:14 AM      START taking these medications    Details   !! methocarbamoL (ROBAXIN) 750 mg tablet Take 1 Tablet by mouth three (3) times daily as needed for Muscle Spasm(s). , Normal, Disp-21 Tablet, R-0      !! valACYclovir (VALTREX) 1 gram tablet Take 1 Tablet by mouth three (3) times daily for 7 days. , Normal, Disp-21 Tablet, R-0       !! - Potential duplicate medications found. Please discuss with provider.       CONTINUE these medications which have NOT CHANGED Details   butalbital-acetaminophen-caff (Fioricet) -40 mg per capsule Take 1 Capsule by mouth every six (6) hours as needed for Headache., Normal, Disp-12 Capsule, R-0      !! valACYclovir (VALTREX) 1 gram tablet Take 1 Tablet by mouth three (3) times daily. , Normal, Disp-21 Tablet, R-0      metoclopramide HCl (Reglan) 10 mg tablet Take 1 Tablet by mouth every six (6) hours as needed for Nausea., Normal, Disp-28 Tablet, R-0      ondansetron hcl (Zofran) 4 mg tablet Take 1 Tablet by mouth every eight (8) hours as needed for Nausea., Normal, Disp-20 Tablet, R-0      omeprazole (PRILOSEC) 20 mg capsule Take 1 Capsule by mouth daily. , Normal, Disp-30 Capsule, R-0      !! methocarbamoL (ROBAXIN) 500 mg tablet Take 1 Tab by mouth three (3) times daily. , Normal, Disp-30 Tab, R-0      lidocaine 4 % patch 1 Patch by TransDERmal route every twelve (12) hours every twelve (12) hours. , Normal, Disp-5 Patch, R-2      lidocaine (LIDODERM) 5 % Apply patch to the affected area for 12 hours a day and remove for 12 hours a day., Normal, Disp-5 Each, R-0      atorvastatin (LIPITOR) 10 mg tablet Take 10 mg by mouth daily. , Historical Med      dulaglutide (TRULICITY) 1.5 SI/0.8 mL sub-q pen 1.5 mg by SubCUTAneous route every seven (7) days. , Historical Med      colchicine 0.6 mg tablet Take 0.6 mg by mouth daily. , Historical Med      NIFEdipine ER (ADALAT CC) 90 mg ER tablet Take 90 mg by mouth daily. , Historical Med, R-0      melatonin 5 mg cap capsule Take 5 mg by mouth nightly., Historical Med      traZODone (DESYREL) 50 mg tablet Take 50 mg by mouth as needed for Sleep., Historical Med      irbesartan (AVAPRO) 300 mg tablet Take 300 mg by mouth daily. , Historical Med      tamsulosin (FLOMAX) 0.4 mg capsule Take 0.4 mg by mouth daily. , Historical Med      aspirin 81 mg chewable tablet Take 81 mg by mouth daily. , Historical Med      metFORMIN (GLUCOPHAGE) 1,000 mg tablet Take 1,000 mg by mouth two (2) times daily (with meals). , Historical Med       !! - Potential duplicate medications found. Please discuss with provider. 2.   Follow-up Information     Follow up With Specialties Details Why Contact Info    Annabel Cervantes NP Nurse Practitioner In 3 days  [address] [facility] [address]       [facility] EMERGENCY DEPT Emergency Medicine  If symptoms worsen [address]  [address] [facility]  [phone]        3. Return to ED if worse     Diagnosis     Clinical Impression:   1. Acute intractable headache, unspecified headache type    2. Neck muscle spasm        Attestations:    Kanchan Hammond MD    Please note that this dictation was completed with [employer], the [employer] voice recognition software. Quite often unanticipated grammatical, syntax, homophones, and other interpretive errors are inadvertently transcribed by the computer software. Please disregard these errors. Please excuse any errors that have escaped final proofreading. Thank you.

## 2022-05-22 LAB
ATRIAL RATE: 76 BPM
CALCULATED P AXIS, ECG09: 65 DEGREES
CALCULATED R AXIS, ECG10: -53 DEGREES
CALCULATED T AXIS, ECG11: -2 DEGREES
DIAGNOSIS, 93000: NORMAL
P-R INTERVAL, ECG05: 160 MS
Q-T INTERVAL, ECG07: 370 MS
QRS DURATION, ECG06: 82 MS
QTC CALCULATION (BEZET), ECG08: 416 MS
VENTRICULAR RATE, ECG03: 76 BPM

## 2022-05-22 RX ORDER — VALACYCLOVIR HYDROCHLORIDE 1 G/1
1000 TABLET, FILM COATED ORAL 3 TIMES DAILY
Qty: 21 TABLET | Refills: 0 | Status: SHIPPED | OUTPATIENT
Start: 2022-05-22 | End: 2022-05-29

## 2022-05-22 RX ORDER — METHOCARBAMOL 750 MG/1
750 TABLET, FILM COATED ORAL
Qty: 21 TABLET | Refills: 0 | Status: SHIPPED | OUTPATIENT
Start: 2022-05-22

## 2022-05-22 NOTE — DISCHARGE INSTRUCTIONS
You were seen in the ER for your symptoms. Please take Motrin Tylenol for headache as well as the muscle relaxer for muscle spasm. Please monitor your skin for rash, if a rash develops this could be shingles. Please begin taking the Valtrex at that time. Please return for new or worsening symptoms anytime.

## 2022-11-29 ENCOUNTER — APPOINTMENT (OUTPATIENT)
Dept: GENERAL RADIOLOGY | Age: 62
End: 2022-11-29
Attending: PHYSICIAN ASSISTANT
Payer: MEDICARE

## 2022-11-29 ENCOUNTER — HOSPITAL ENCOUNTER (EMERGENCY)
Age: 62
Discharge: HOME | End: 2022-11-29
Attending: EMERGENCY MEDICINE
Payer: MEDICARE

## 2022-11-29 VITALS
DIASTOLIC BLOOD PRESSURE: 91 MMHG | OXYGEN SATURATION: 100 % | SYSTOLIC BLOOD PRESSURE: 150 MMHG | TEMPERATURE: 98.4 F | BODY MASS INDEX: 38.97 KG/M2 | HEART RATE: 69 BPM | RESPIRATION RATE: 18 BRPM | HEIGHT: 66 IN | WEIGHT: 242.51 LBS

## 2022-11-29 DIAGNOSIS — M54.42 LEFT-SIDED LOW BACK PAIN WITH LEFT-SIDED SCIATICA, UNSPECIFIED CHRONICITY: Primary | ICD-10-CM

## 2022-11-29 DIAGNOSIS — M53.9 MULTILEVEL DEGENERATIVE DISC DISEASE: ICD-10-CM

## 2022-11-29 DIAGNOSIS — R03.0 ELEVATED BLOOD PRESSURE READING: ICD-10-CM

## 2022-11-29 PROCEDURE — 72100 X-RAY EXAM L-S SPINE 2/3 VWS: CPT

## 2022-11-29 PROCEDURE — 99283 EMERGENCY DEPT VISIT LOW MDM: CPT

## 2022-11-29 RX ORDER — METHOCARBAMOL 750 MG/1
750 TABLET, FILM COATED ORAL 4 TIMES DAILY
Qty: 30 TABLET | Refills: 0 | Status: SHIPPED | OUTPATIENT
Start: 2022-11-29

## 2022-11-29 RX ORDER — METHYLPREDNISOLONE 4 MG/1
TABLET ORAL
Qty: 1 DOSE PACK | Refills: 0 | Status: SHIPPED | OUTPATIENT
Start: 2022-11-29

## 2022-11-29 NOTE — ED NOTES
Patient given printed discharge instructions reviewed by the MD. Patient understands instructions/follow up recommendations. Patient ambulated out of ED with spouse at his side once spouse is discharged as well.

## 2022-11-29 NOTE — ED PROVIDER NOTES
EMERGENCY DEPARTMENT HISTORY AND PHYSICAL EXAM      Date: 11/29/2022  Patient Name: Magdi Mishra    History of Presenting Illness     Chief Complaint   Patient presents with    Back Pain     2 weeks lower left back down left leg. Denies injury       History Provided By: Patient    HPI: Magdi Mishra, 58 y.o. male arthritis, chronic pain, HTN, gout, DMII on metformin, per patient and record review, presents to the ED with cc of mild to moderate, intermittent low back pain for the past few weeks. Patient states this is somewhat of an acute on chronic issue for him, as he has had sciatic episodes in the past.  States he has been evaluated and told he has arthritis in his lower spine. States the pain is an aching pain to the left low back, with intermittent sharp pains that radiate down the lateral aspect of his left leg. Pain is worse with certain movement such as bending and twisting. States he is better when he is sitting down. Minimal improvement with over-the-counter pain relievers. Denies recent trauma or injuries. Denies any bowel or bladder incontinence/retention, or saddle anesthesias. Denies any extremity numbness, weakness, or tingling. No prior history of back surgeries. Denies night sweats or weight loss. Denies fevers or chills, dizziness, headache, neck pain, chest pain, shortness of breath, Young pain, nausea, vomiting, diarrhea, blood in urine or stool, rashes or weakness. No additional exacerbating alleviating factors. No other complaints at this time. There are no other complaints, changes, or physical findings at this time. PCP: Colin Gasca NP    Current Outpatient Medications   Medication Sig Dispense Refill    methocarbamoL (Robaxin-750) 750 mg tablet Take 1 Tablet by mouth four (4) times daily.  30 Tablet 0    methylPREDNISolone (Medrol, Narendra,) 4 mg tablet Take as instructed on pack 1 Dose Pack 0    butalbital-acetaminophen-caff (Fioricet) -40 mg per capsule Take 1 Capsule by mouth every six (6) hours as needed for Headache. 12 Capsule 0    valACYclovir (VALTREX) 1 gram tablet Take 1 Tablet by mouth three (3) times daily. 21 Tablet 0    metoclopramide HCl (Reglan) 10 mg tablet Take 1 Tablet by mouth every six (6) hours as needed for Nausea. 28 Tablet 0    ondansetron hcl (Zofran) 4 mg tablet Take 1 Tablet by mouth every eight (8) hours as needed for Nausea. 20 Tablet 0    omeprazole (PRILOSEC) 20 mg capsule Take 1 Capsule by mouth daily. 30 Capsule 0    lidocaine 4 % patch 1 Patch by TransDERmal route every twelve (12) hours every twelve (12) hours. (Patient not taking: Reported on 8/26/2021) 5 Patch 2    lidocaine (LIDODERM) 5 % Apply patch to the affected area for 12 hours a day and remove for 12 hours a day. 5 Each 0    atorvastatin (LIPITOR) 10 mg tablet Take 10 mg by mouth daily. dulaglutide (TRULICITY) 1.5 UH/5.1 mL sub-q pen 1.5 mg by SubCUTAneous route every seven (7) days. colchicine 0.6 mg tablet Take 0.6 mg by mouth daily. NIFEdipine ER (ADALAT CC) 90 mg ER tablet Take 90 mg by mouth daily. 0    melatonin 5 mg cap capsule Take 5 mg by mouth nightly. traZODone (DESYREL) 50 mg tablet Take 50 mg by mouth as needed for Sleep.      irbesartan (AVAPRO) 300 mg tablet Take 300 mg by mouth daily. tamsulosin (FLOMAX) 0.4 mg capsule Take 0.4 mg by mouth daily. aspirin 81 mg chewable tablet Take 81 mg by mouth daily. metFORMIN (GLUCOPHAGE) 1,000 mg tablet Take 1,000 mg by mouth two (2) times daily (with meals).        Past History     Past Medical History:  Past Medical History:   Diagnosis Date    Arthritis     Chronic pain     Diabetes ([address]     GERD (gastroesophageal reflux disease)     Gout     Hypertension     Other ill-defined conditions(799.89)     gout       Past Surgical History:  Past Surgical History:   Procedure Laterality Date    HX APPENDECTOMY      HX ORTHOPAEDIC      RIGHT knee scope       Family History:  Family History   Problem Relation Age of Onset    Hypertension Mother     Diabetes Mother     Heart Disease Father        Social History:  Social History     Tobacco Use    Smoking status: Never    Smokeless tobacco: Never   Substance Use Topics    Alcohol use: Yes     Comment: occassionally \"weekends\"    Drug use: No       Allergies: Allergies   Allergen Reactions    Uloric [Febuxostat] Anaphylaxis    Allopurinol Hives    Hydralazine Other (comments)     Review of Systems   Review of Systems   Constitutional:  Negative for appetite change, chills and fever. HENT:  Negative for congestion. Eyes:  Negative for pain. Respiratory:  Negative for cough and shortness of breath. Cardiovascular:  Negative for chest pain. Gastrointestinal:  Negative for abdominal pain, constipation, diarrhea, nausea and vomiting. Genitourinary:  Negative for difficulty urinating, dysuria, enuresis and frequency. Musculoskeletal:  Positive for back pain and myalgias. Negative for neck pain and neck stiffness. Skin:  Negative for rash. Neurological:  Negative for syncope, weakness, numbness and headaches. All other systems reviewed and are negative. Physical Exam   Physical Exam  Vitals and nursing note reviewed. Constitutional:       General: He is not in acute distress. Appearance: Normal appearance. He is not ill-appearing or toxic-appearing. Comments: 58 y.o. male   HENT:      Head: Normocephalic and atraumatic. Right Ear: External ear normal.      Left Ear: External ear normal.      Nose: Nose normal.      Mouth/Throat:      Mouth: Mucous membranes are moist.   Eyes:      Extraocular Movements: Extraocular movements intact. Conjunctiva/sclera: Conjunctivae normal.   Cardiovascular:      Rate and Rhythm: Normal rate and regular rhythm. Pulses: Normal pulses. Heart sounds: Normal heart sounds. No murmur heard.   Pulmonary:      Effort: Pulmonary effort is normal. No respiratory distress. Breath sounds: Normal breath sounds. No wheezing. Abdominal:      General: There is no distension. Palpations: Abdomen is soft. There is no mass. Tenderness: There is no abdominal tenderness. There is no guarding. Musculoskeletal:         General: Normal range of motion. Cervical back: Normal range of motion. Comments: TTP to left lumbosacral paraspinal muscles and across midline, no point tenderness, no palpable deformities, good ROM. Distal sensation equal and intact. Strong DP and PT pulses bilaterally. Ambulatory with normal gait. Skin:     General: Skin is warm and dry. Neurological:      General: No focal deficit present. Mental Status: He is alert and oriented to person, place, and time. Psychiatric:         Mood and Affect: Mood normal.         Behavior: Behavior normal.     Diagnostic Study Results     Labs -   No results found for this or any previous visit (from the past 12 hour(s)). Radiologic Studies -   XR SPINE LUMB 2 OR 3 V   Final Result   Multilevel lumbosacral degenerative disc disease without acute   abnormality. CT Results  (Last 48 hours)      None          CXR Results  (Last 48 hours)      None          Medical Decision Making   I am the first provider for this patient. I reviewed the vital signs, available nursing notes, past medical history, past surgical history, family history and social history. Vital Signs-Reviewed the patient's vital signs. No data found. Records Reviewed: Nursing Notes and Old Medical Records    Provider Notes (Medical Decision Making):   Patient presents for back pain as noted above. History and physical exam consistent with musculoskeletal etiology. No neurological deficits such as bowel or bladder dysfunction or new weakness or numbness in the lower extremities.   Based on history and physical exam there is a low suspicion for vascular etiology such as aortic aneurysm, aortic dissection, mesenteric ischemia, epidural abscess, osteomyelitis, meningitis, metastatic cancer, acute bacterial infection, or other emergent conditions going further evaluation/management acutely at this time. Shared decision making performed and care plan created together, discussed work-up and imaging results, diagnosis and treatment plan. Will place on short course steroid taper (discussed monitoring blood sugars closely, for which she states are stable) and short course muscle relaxer. Counseled additional symptomatic management techniques. Orthopedic/spine follow-up. PCP follow-up. Verbal return precautions advised. Patient verbalizes understanding and agreement of current plan of care. ED Course:   Initial assessment performed. The patients presenting problems have been discussed, and they are in agreement with the care plan formulated and outlined with them. I have encouraged them to ask questions as they arise throughout their visit. Disposition:  Discharge     PLAN:  1. Discharge Medication List as of 11/29/2022 11:35 AM        START taking these medications    Details   methocarbamoL (Robaxin-750) 750 mg tablet Take 1 Tablet by mouth four (4) times daily. , Normal, Disp-30 Tablet, R-0      methylPREDNISolone (Medrol, Narendra,) 4 mg tablet Take as instructed on pack, Normal, Disp-1 Dose Pack, R-0           CONTINUE these medications which have NOT CHANGED    Details   butalbital-acetaminophen-caff (Fioricet) -40 mg per capsule Take 1 Capsule by mouth every six (6) hours as needed for Headache., Normal, Disp-12 Capsule, R-0      valACYclovir (VALTREX) 1 gram tablet Take 1 Tablet by mouth three (3) times daily. , Normal, Disp-21 Tablet, R-0      metoclopramide HCl (Reglan) 10 mg tablet Take 1 Tablet by mouth every six (6) hours as needed for Nausea., Normal, Disp-28 Tablet, R-0      ondansetron hcl (Zofran) 4 mg tablet Take 1 Tablet by mouth every eight (8) hours as needed for Nausea., Normal, Disp-20 Tablet, R-0      omeprazole (PRILOSEC) 20 mg capsule Take 1 Capsule by mouth daily. , Normal, Disp-30 Capsule, R-0      lidocaine 4 % patch 1 Patch by TransDERmal route every twelve (12) hours every twelve (12) hours. , Normal, Disp-5 Patch, R-2      lidocaine (LIDODERM) 5 % Apply patch to the affected area for 12 hours a day and remove for 12 hours a day., Normal, Disp-5 Each, R-0      atorvastatin (LIPITOR) 10 mg tablet Take 10 mg by mouth daily. , Historical Med      dulaglutide (TRULICITY) 1.5 SE/0.4 mL sub-q pen 1.5 mg by SubCUTAneous route every seven (7) days. , Historical Med      colchicine 0.6 mg tablet Take 0.6 mg by mouth daily. , Historical Med      NIFEdipine ER (ADALAT CC) 90 mg ER tablet Take 90 mg by mouth daily. , Historical Med, R-0      melatonin 5 mg cap capsule Take 5 mg by mouth nightly., Historical Med      traZODone (DESYREL) 50 mg tablet Take 50 mg by mouth as needed for Sleep., Historical Med      irbesartan (AVAPRO) 300 mg tablet Take 300 mg by mouth daily. , Historical Med      tamsulosin (FLOMAX) 0.4 mg capsule Take 0.4 mg by mouth daily. , Historical Med      aspirin 81 mg chewable tablet Take 81 mg by mouth daily. , Historical Med      metFORMIN (GLUCOPHAGE) 1,000 mg tablet Take 1,000 mg by mouth two (2) times daily (with meals). , Historical Med           2.    Follow-up Information       Follow up With Specialties Details Why Contact Info    [facility] EMERGENCY DEPT Emergency Medicine  As needed, If symptoms worsen [address]    Delorise NICOLLE Burleson Nurse Practitioner In 1 week  [address] [address]  [address] [facility], [address] [place] [address] N, DO Orthopaedic Surgery of the Spine Physician, Orthopedic Surgery In 1 week  [address] [facility] [address] [facility] [address] Jacki Alcantara MD Neurosurgery In 1 week  [address] [facility] [address] [facility]      [address] [facility] PHYSICAL THERAPY    [address] [facility] [[email or web address]] [id number]          Return to ED if worse     Diagnosis     Clinical Impression:   1. Left-sided low back pain with left-sided sciatica, unspecified chronicity    2. Multilevel degenerative disc disease    3.  Elevated blood pressure reading

## 2022-11-29 NOTE — Clinical Note
[address]  [facility] EMERGENCY DEPT  [address] [facility]  [place] [id number] [phone]    Work/School Note    Date: 11/29/2022    To Whom It May concern:    Maria Esther Corado was seen and treated today in the emergency room by the following provider(s):  Attending Provider: Alfredito Hays MD  Physician Assistant: Beatris Valle, [address] [place] [address]. Maria Esther Corado is excused from work/school on 11/29/2022 through 12/1/2022. He is medically clear to return to work/school on 12/2/2022.          Sincerely,          HORTENSIA Silva

## 2022-11-29 NOTE — DISCHARGE INSTRUCTIONS
Thank You! It was a pleasure taking care of you in our Emergency Department today. We know that when you come to [facility], you are entrusting us with your health, comfort, and safety. Our clinicians honor that trust, and truly appreciate the opportunity to care for you and your loved ones. We also value your feedback. If you receive a survey about your Emergency Department experience today, please fill it out. We care about our patients' feedback, and we listen to what you have to say. Thank you. Naga Mojica PA-C     _______________________________________________________  I have included a copy of your lab results and/or radiologic studies from today's visit so you can have them easily available at your follow-up visit. We hope you feel better and please do not hesitate to contact the ED if you have any questions at all! No results found for this or any previous visit (from the past 12 hour(s)). XR SPINE LUMB 2 OR 3 V   Final Result   Multilevel lumbosacral degenerative disc disease without acute   abnormality. CT Results  (Last 48 hours)      None          The exam and treatment you received in the Emergency Department were for an urgent problem and are not intended as complete care. It is important that you follow up with a doctor, nurse practitioner, or physician assistant for ongoing care. If your symptoms become worse or you do not improve as expected and you are unable to reach your usual health care provider, you should return to the Emergency Department. We are available 24 hours a day. Please take your discharge instructions with you when you go to your follow-up appointment. If a prescription has been provided, please have it filled as soon as possible to prevent a delay in treatment. Read the entire medication instruction sheet provided to you by the pharmacy.  If you have any questions or reservations about taking the medication due to side effects or interactions with other medications, please call your primary care physician or contact the ER to speak with the charge nurse. Please make an appointment with your family doctor or the physician you were referred to for follow-up of this visit as instructed on your discharge paperwork. Return to the ER if you are unable to be seen or if you are unable to be seen in a timely manner. If you have any problem arranging the follow-up visit, contact the Emergency Department immediately.

## 2023-01-07 ENCOUNTER — HOSPITAL ENCOUNTER (EMERGENCY)
Age: 63
Discharge: HOME | End: 2023-01-07
Attending: EMERGENCY MEDICINE
Payer: MEDICARE

## 2023-01-07 ENCOUNTER — APPOINTMENT (OUTPATIENT)
Dept: GENERAL RADIOLOGY | Age: 63
End: 2023-01-07
Attending: EMERGENCY MEDICINE
Payer: MEDICARE

## 2023-01-07 VITALS
SYSTOLIC BLOOD PRESSURE: 134 MMHG | RESPIRATION RATE: 18 BRPM | HEIGHT: 66 IN | TEMPERATURE: 97.7 F | OXYGEN SATURATION: 95 % | DIASTOLIC BLOOD PRESSURE: 75 MMHG | HEART RATE: 81 BPM | WEIGHT: 241.62 LBS | BODY MASS INDEX: 38.83 KG/M2

## 2023-01-07 DIAGNOSIS — U07.1 COVID-19: Primary | ICD-10-CM

## 2023-01-07 LAB
COVID-19 RAPID TEST, COVR: DETECTED
FLUAV AG NPH QL IA: NEGATIVE
FLUBV AG NOSE QL IA: NEGATIVE
SOURCE, COVRS: ABNORMAL

## 2023-01-07 PROCEDURE — 71045 X-RAY EXAM CHEST 1 VIEW: CPT

## 2023-01-07 PROCEDURE — 74011250637 HC RX REV CODE- 250/637: Performed by: EMERGENCY MEDICINE

## 2023-01-07 PROCEDURE — 99284 EMERGENCY DEPT VISIT MOD MDM: CPT

## 2023-01-07 PROCEDURE — 87635 SARS-COV-2 COVID-19 AMP PRB: CPT

## 2023-01-07 PROCEDURE — 87804 INFLUENZA ASSAY W/OPTIC: CPT

## 2023-01-07 PROCEDURE — 74011636637 HC RX REV CODE- 636/637: Performed by: EMERGENCY MEDICINE

## 2023-01-07 RX ORDER — PREDNISONE 20 MG/1
60 TABLET ORAL
Status: COMPLETED | OUTPATIENT
Start: 2023-01-07 | End: 2023-01-07

## 2023-01-07 RX ORDER — PREDNISONE 20 MG/1
40 TABLET ORAL DAILY
Qty: 14 TABLET | Refills: 0 | Status: SHIPPED | OUTPATIENT
Start: 2023-01-07 | End: 2023-01-14

## 2023-01-07 RX ORDER — CODEINE PHOSPHATE AND GUAIFENESIN 10; 100 MG/5ML; MG/5ML
10 SOLUTION ORAL
Qty: 236 ML | Refills: 0 | Status: SHIPPED | OUTPATIENT
Start: 2023-01-07 | End: 2023-01-12

## 2023-01-07 RX ORDER — CODEINE PHOSPHATE AND GUAIFENESIN 10; 100 MG/5ML; MG/5ML
10 SOLUTION ORAL
Status: COMPLETED | OUTPATIENT
Start: 2023-01-07 | End: 2023-01-07

## 2023-01-07 RX ADMIN — GUAIFENESIN AND CODEINE PHOSPHATE 10 ML: 100; 10 SOLUTION ORAL at 07:15

## 2023-01-07 RX ADMIN — PREDNISONE 60 MG: 20 TABLET ORAL at 07:14

## 2023-01-07 NOTE — DISCHARGE INSTRUCTIONS
It was a pleasure taking care of you at [facility] Emergency Department today. We know that when you come to [facility], you are entrusting us with your health, comfort, and safety. Our physicians and nurses honor that trust, and we truly appreciate the opportunity to care for you and your loved ones. We also value your feedback. If you receive a survey about your Emergency Department experience today, please fill it out. We care about our patients' feedback, and we listen to what you have to say. Thank you!

## 2023-01-07 NOTE — ED PROVIDER NOTES
[facility] EMERGENCY DEPT  EMERGENCY DEPARTMENT ENCOUNTER       Pt Name: Tabatha Steel  MRN: [id number]  [place] 1960  Date of evaluation: 1/7/2023  Provider: Nancy Shook MD   PCP: Lizzie Luna NP  Note Started: 6:52 AM 1/7/23     CHIEF COMPLAINT       Chief Complaint   Patient presents with    Flu     Flu/covid like symptoms since new years jun. Has has a persistent productive cough, chills, wheezing and sweating. Ambulatory. Nonlabored breathing         HISTORY OF PRESENT ILLNESS: 1 or more elements      History From: patient, History limited by: none     Tabatha Steel is a 58 y.o. male who presents complaining of cough for 3 to 4 days, mild irritation in his throat. Generalized body aches and sweating. Mild to moderate severity. Nursing Notes were all reviewed and agreed with or any disagreements were addressed in the HPI. REVIEW OF SYSTEMS        Positives and Pertinent negatives as per HPI. PAST HISTORY     Past Medical History:  Past Medical History:   Diagnosis Date    Arthritis     Chronic pain     Diabetes (HCC)     GERD (gastroesophageal reflux disease)     Gout     Hypertension     Other ill-defined conditions(799.89)     gout       Past Surgical History:  Past Surgical History:   Procedure Laterality Date    HX APPENDECTOMY      HX ORTHOPAEDIC      RIGHT knee scope       Family History:  Family History   Problem Relation Age of Onset    Hypertension Mother     Diabetes Mother     Heart Disease Father        Social History:  Social History     Tobacco Use    Smoking status: Never    Smokeless tobacco: Never   Substance Use Topics    Alcohol use: Yes     Comment: occassionally \"weekends\"    Drug use: No       Allergies:   Allergies   Allergen Reactions    Uloric [Febuxostat] Anaphylaxis    Allopurinol Hives    Hydralazine Other (comments)       CURRENT MEDICATIONS      Discharge Medication List as of 1/7/2023  7:24 AM        CONTINUE these medications which have NOT CHANGED    Details   methocarbamoL (Robaxin-750) 750 mg tablet Take 1 Tablet by mouth four (4) times daily. , Normal, Disp-30 Tablet, R-0      butalbital-acetaminophen-caff (Fioricet) -40 mg per capsule Take 1 Capsule by mouth every six (6) hours as needed for Headache., Normal, Disp-12 Capsule, R-0      valACYclovir (VALTREX) 1 gram tablet Take 1 Tablet by mouth three (3) times daily. , Normal, Disp-21 Tablet, R-0      metoclopramide HCl (Reglan) 10 mg tablet Take 1 Tablet by mouth every six (6) hours as needed for Nausea., Normal, Disp-28 Tablet, R-0      ondansetron hcl (Zofran) 4 mg tablet Take 1 Tablet by mouth every eight (8) hours as needed for Nausea., Normal, Disp-20 Tablet, R-0      omeprazole (PRILOSEC) 20 mg capsule Take 1 Capsule by mouth daily. , Normal, Disp-30 Capsule, R-0      lidocaine 4 % patch 1 Patch by TransDERmal route every twelve (12) hours every twelve (12) hours. , Normal, Disp-5 Patch, R-2      lidocaine (LIDODERM) 5 % Apply patch to the affected area for 12 hours a day and remove for 12 hours a day., Normal, Disp-5 Each, R-0      atorvastatin (LIPITOR) 10 mg tablet Take 10 mg by mouth daily. , Historical Med      dulaglutide (TRULICITY) 1.5 RZ/7.9 mL sub-q pen 1.5 mg by SubCUTAneous route every seven (7) days. , Historical Med      colchicine 0.6 mg tablet Take 0.6 mg by mouth daily. , Historical Med      NIFEdipine ER (ADALAT CC) 90 mg ER tablet Take 90 mg by mouth daily. , Historical Med, R-0      melatonin 5 mg cap capsule Take 5 mg by mouth nightly., Historical Med      traZODone (DESYREL) 50 mg tablet Take 50 mg by mouth as needed for Sleep., Historical Med      irbesartan (AVAPRO) 300 mg tablet Take 300 mg by mouth daily. , Historical Med      tamsulosin (FLOMAX) 0.4 mg capsule Take 0.4 mg by mouth daily. , Historical Med      aspirin 81 mg chewable tablet Take 81 mg by mouth daily. , Historical Med      metFORMIN (GLUCOPHAGE) 1,000 mg tablet Take 1,000 mg by mouth two (2) times daily (with meals). , Historical Med             SCREENINGS               No data recorded         PHYSICAL EXAM      ED Triage Vitals [01/07/23 6809]   ED Encounter Vitals Group      /75      Pulse (Heart Rate) 81      Resp Rate 18      Temp 97.7 °F (36.5 °C)      Temp src       O2 Sat (%) 95 %      Weight 241 lb 10 oz      Height 5' 6\"        Physical Exam  Vitals reviewed. Constitutional:       General: He is not in acute distress. Appearance: Normal appearance. He is not ill-appearing. HENT:      Mouth/Throat:      Pharynx: Oropharynx is clear. No oropharyngeal exudate or posterior oropharyngeal erythema. Cardiovascular:      Rate and Rhythm: Normal rate and regular rhythm. Pulmonary:      Effort: Pulmonary effort is normal. No respiratory distress. Breath sounds: No wheezing or rhonchi. Abdominal:      General: There is no distension. Palpations: Abdomen is soft. Tenderness: There is no abdominal tenderness. Musculoskeletal:         General: Normal range of motion. Skin:     General: Skin is warm. Neurological:      General: No focal deficit present. Mental Status: He is alert and oriented to person, place, and time. Psychiatric:         Thought Content: Thought content normal.        DIAGNOSTIC RESULTS   LABS:     Recent Results (from the past 12 hour(s))   INFLUENZA A+B VIRAL AGS    Collection Time: 01/07/23  5:04 AM   Result Value Ref Range    Influenza A Antigen Negative NEG      Influenza B Antigen Negative NEG     COVID-19 RAPID TEST    Collection Time: 01/07/23  5:04 AM   Result Value Ref Range    Specimen source Nasopharyngeal      COVID-19 rapid test Detected (AA) NOTD          EKG:  If performed, independent interpretation documented below in the MDM section     RADIOLOGY:  Non-plain film images such as CT, Ultrasound and MRI are read by the radiologist. Plain radiographic images are visualized and preliminarily interpreted by the ED Provider with the findings documented in the MDM section. Interpretation per the Radiologist below, if available at the time of this note:     XR CHEST PORT    Result Date: 1/7/2023  EXAM:  XR CHEST PORT INDICATION: Cough COMPARISON: 4/21/2021 TECHNIQUE: Upright portable chest AP view FINDINGS: The cardiac silhouette is within normal limits. The pulmonary vasculature is within normal limits. The lungs and pleural spaces are clear. The visualized bones and upper abdomen are age-appropriate. No acute process on portable chest.        PROCEDURES   Unless otherwise noted below, none  Procedures     CRITICAL CARE TIME       EMERGENCY DEPARTMENT COURSE and DIFFERENTIAL DIAGNOSIS/MDM   Vitals:    Vitals:    01/07/23 0459   BP: 134/75   Pulse: 81   Resp: 18   Temp: 97.7 °F (36.5 °C)   SpO2: 95%   Weight: 109.6 kg (241 lb 10 oz)   Height: 5' 6\" (1.676 m)        Patient was given the following medications:  Medications   predniSONE (DELTASONE) tablet 60 mg (60 mg Oral Given 1/7/23 0714)   guaiFENesin-codeine (ROBITUSSIN AC) 100-10 mg/5 mL solution 10 mL (10 mL Oral Given 1/7/23 0715)       Medical Decision Making  57-year-old male complaining of mild throat irritation, cough, chest congestion symptoms, for 3 to 4 days. Mild body aches and generalized sweating. No significant shortness of breath or pain. Non-smoker. Took TheraFlu without improvement. Well-appearing, no significant distress. Normal vital signs, afebrile. His lungs are completely clear, no rhonchi or wheezing on my exam.  Normal oropharynx. He tested positive for COVID-19. I viewed his chest x-ray which is clear, no infiltrate. No evidence of pneumonia today. No antibiotics are indicated. Would not recommend Paxlovid. Recommend trial of prednisone and guaifenesin codeine cough syrup, follow-up primary care. Amount and/or Complexity of Data Reviewed  Radiology: ordered and independent interpretation performed.  Decision-making details documented in ED Course. Risk  OTC drugs. Prescription drug management. FINAL IMPRESSION     1. COVID-19          DISPOSITION/PLAN   Jacquelyne Bloch  results have been reviewed with him. He has been counseled regarding his diagnosis, treatment, and plan. He verbally conveys understanding and agreement of the signs, symptoms, diagnosis, treatment and prognosis and additionally agrees to follow up as discussed. He also agrees with the care-plan and conveys that all of his questions have been answered. I have also provided discharge instructions for him that include: educational information regarding their diagnosis and treatment, and list of reasons why they would want to return to the ED prior to their follow-up appointment, should his condition change. CLINICAL IMPRESSION    Discharge Note: The patient is stable for discharge home. The signs, symptoms, diagnosis, and discharge instructions have been discussed, understanding conveyed, and agreed upon. The patient is to follow up as recommended or return to ER should their symptoms worsen. PATIENT REFERRED TO:  Follow-up Information       Follow up With Specialties Details Why Contact Info    Geovanni Loaiza NP Nurse Practitioner   [address] [place] [address] [facility] EMERGENCY DEPT Emergency Medicine   [address] [place] [address]  [phone]              DISCHARGE MEDICATIONS:  Discharge Medication List as of 1/7/2023  7:24 AM        START taking these medications    Details   predniSONE (DELTASONE) 20 mg tablet Take 2 Tablets by mouth daily for 7 days. With Breakfast, Normal, Disp-14 Tablet, R-0      guaiFENesin-codeine (guaiFENesin AC) 100-10 mg/5 mL solution Take 10 mL by mouth three (3) times daily as needed for Cough for up to 5 days.  Max Daily Amount: 30 mL., Normal, Disp-236 mL, R-0           CONTINUE these medications which have NOT CHANGED    Details   methocarbamoL (Robaxin-750) 750 mg tablet Take 1 Tablet by mouth four (4) times daily. , Normal, Disp-30 Tablet, R-0      butalbital-acetaminophen-caff (Fioricet) -40 mg per capsule Take 1 Capsule by mouth every six (6) hours as needed for Headache., Normal, Disp-12 Capsule, R-0      valACYclovir (VALTREX) 1 gram tablet Take 1 Tablet by mouth three (3) times daily. , Normal, Disp-21 Tablet, R-0      metoclopramide HCl (Reglan) 10 mg tablet Take 1 Tablet by mouth every six (6) hours as needed for Nausea., Normal, Disp-28 Tablet, R-0      ondansetron hcl (Zofran) 4 mg tablet Take 1 Tablet by mouth every eight (8) hours as needed for Nausea., Normal, Disp-20 Tablet, R-0      omeprazole (PRILOSEC) 20 mg capsule Take 1 Capsule by mouth daily. , Normal, Disp-30 Capsule, R-0      lidocaine 4 % patch 1 Patch by TransDERmal route every twelve (12) hours every twelve (12) hours. , Normal, Disp-5 Patch, R-2      lidocaine (LIDODERM) 5 % Apply patch to the affected area for 12 hours a day and remove for 12 hours a day., Normal, Disp-5 Each, R-0      atorvastatin (LIPITOR) 10 mg tablet Take 10 mg by mouth daily. , Historical Med      dulaglutide (TRULICITY) 1.5 EX/0.8 mL sub-q pen 1.5 mg by SubCUTAneous route every seven (7) days. , Historical Med      colchicine 0.6 mg tablet Take 0.6 mg by mouth daily. , Historical Med      NIFEdipine ER (ADALAT CC) 90 mg ER tablet Take 90 mg by mouth daily. , Historical Med, R-0      melatonin 5 mg cap capsule Take 5 mg by mouth nightly., Historical Med      traZODone (DESYREL) 50 mg tablet Take 50 mg by mouth as needed for Sleep., Historical Med      irbesartan (AVAPRO) 300 mg tablet Take 300 mg by mouth daily. , Historical Med      tamsulosin (FLOMAX) 0.4 mg capsule Take 0.4 mg by mouth daily. , Historical Med      aspirin 81 mg chewable tablet Take 81 mg by mouth daily. , Historical Med      metFORMIN (GLUCOPHAGE) 1,000 mg tablet Take 1,000 mg by mouth two (2) times daily (with meals). , Historical Med               DISCONTINUED MEDICATIONS:  Discharge Medication List as of 1/7/2023  7:24 AM          I am the Primary Clinician of Record. Janie Araiza MD (electronically signed)    (Please note that parts of this dictation were completed with voice recognition software. Quite often unanticipated grammatical, syntax, homophones, and other interpretive errors are inadvertently transcribed by the computer software. Please disregards these errors.  Please excuse any errors that have escaped final proofreading.)

## 2023-04-25 ENCOUNTER — APPOINTMENT (OUTPATIENT)
Dept: GENERAL RADIOLOGY | Age: 63
End: 2023-04-25
Payer: MEDICARE

## 2023-04-25 ENCOUNTER — HOSPITAL ENCOUNTER (EMERGENCY)
Age: 63
Discharge: HOME | End: 2023-04-25
Attending: EMERGENCY MEDICINE
Payer: MEDICARE

## 2023-04-25 VITALS
TEMPERATURE: 97.9 F | WEIGHT: 247.8 LBS | RESPIRATION RATE: 18 BRPM | HEIGHT: 67 IN | OXYGEN SATURATION: 95 % | HEART RATE: 69 BPM | DIASTOLIC BLOOD PRESSURE: 94 MMHG | BODY MASS INDEX: 38.89 KG/M2 | SYSTOLIC BLOOD PRESSURE: 158 MMHG

## 2023-04-25 DIAGNOSIS — M54.41 ACUTE RIGHT-SIDED LOW BACK PAIN WITH RIGHT-SIDED SCIATICA: ICD-10-CM

## 2023-04-25 DIAGNOSIS — S13.4XXA WHIPLASH INJURIES, INITIAL ENCOUNTER: Primary | ICD-10-CM
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
## 2023-04-25 PROCEDURE — 72100 X-RAY EXAM L-S SPINE 2/3 VWS: CPT

## 2023-04-25 PROCEDURE — 74011000250 HC RX REV CODE- 250

## 2023-04-25 PROCEDURE — 99283 EMERGENCY DEPT VISIT LOW MDM: CPT

## 2023-04-25 PROCEDURE — 74011250637 HC RX REV CODE- 250/637

## 2023-04-25 RX ORDER — LIDOCAINE 4 G/100G
2 PATCH TOPICAL EVERY 24 HOURS
Status: DISCONTINUED | OUTPATIENT
Start: 2023-04-25 | End: 2023-04-25 | Stop reason: HOSPADM

## 2023-04-25 RX ORDER — METHOCARBAMOL 750 MG/1
750 TABLET, FILM COATED ORAL 4 TIMES DAILY
Qty: 20 TABLET | Refills: 0 | Status: SHIPPED | OUTPATIENT
Start: 2023-04-25

## 2023-04-25 RX ORDER — IBUPROFEN 400 MG/1
800 TABLET ORAL ONCE
Status: COMPLETED | OUTPATIENT
Start: 2023-04-25 | End: 2023-04-25

## 2023-04-25 RX ADMIN — IBUPROFEN 800 MG: 400 TABLET ORAL at 13:20
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
